# Patient Record
Sex: FEMALE | Race: WHITE | Employment: OTHER | URBAN - METROPOLITAN AREA
[De-identification: names, ages, dates, MRNs, and addresses within clinical notes are randomized per-mention and may not be internally consistent; named-entity substitution may affect disease eponyms.]

---

## 2017-03-20 PROBLEM — E66.9 OBESITY (BMI 30.0-34.9): Status: ACTIVE | Noted: 2017-03-20

## 2017-03-20 PROBLEM — E66.811 OBESITY (BMI 30.0-34.9): Status: ACTIVE | Noted: 2017-03-20

## 2018-03-20 ENCOUNTER — TELEPHONE (OUTPATIENT)
Dept: ADMINISTRATIVE | Age: 63
End: 2018-03-20

## 2018-06-11 ENCOUNTER — TELEPHONE (OUTPATIENT)
Dept: FAMILY MEDICINE CLINIC | Age: 63
End: 2018-06-11

## 2018-06-25 ASSESSMENT — ENCOUNTER SYMPTOMS
BLURRED VISION: 0
WHEEZING: 0
VOMITING: 0
COUGH: 0
ORTHOPNEA: 0
SPUTUM PRODUCTION: 0
BACK PAIN: 0
DOUBLE VISION: 0
NAUSEA: 0
SHORTNESS OF BREATH: 0
DIARRHEA: 0
ABDOMINAL PAIN: 0
BLOOD IN STOOL: 0
HEARTBURN: 0
SORE THROAT: 0
CONSTIPATION: 0

## 2018-06-26 ENCOUNTER — OFFICE VISIT (OUTPATIENT)
Dept: FAMILY MEDICINE CLINIC | Age: 63
End: 2018-06-26
Payer: COMMERCIAL

## 2018-06-26 VITALS
SYSTOLIC BLOOD PRESSURE: 126 MMHG | HEART RATE: 85 BPM | DIASTOLIC BLOOD PRESSURE: 70 MMHG | RESPIRATION RATE: 18 BRPM | HEIGHT: 66 IN | BODY MASS INDEX: 30.41 KG/M2 | TEMPERATURE: 98.3 F | WEIGHT: 189.25 LBS | OXYGEN SATURATION: 99 %

## 2018-06-26 DIAGNOSIS — C50.412 MALIGNANT NEOPLASM OF UPPER-OUTER QUADRANT OF LEFT BREAST IN FEMALE, ESTROGEN RECEPTOR POSITIVE (HCC): ICD-10-CM

## 2018-06-26 DIAGNOSIS — Z17.0 MALIGNANT NEOPLASM OF UPPER-OUTER QUADRANT OF LEFT BREAST IN FEMALE, ESTROGEN RECEPTOR POSITIVE (HCC): ICD-10-CM

## 2018-06-26 DIAGNOSIS — Z00.00 ANNUAL PHYSICAL EXAM: Primary | ICD-10-CM

## 2018-06-26 DIAGNOSIS — E55.9 VITAMIN D INSUFFICIENCY: ICD-10-CM

## 2018-06-26 DIAGNOSIS — I10 ESSENTIAL HYPERTENSION: ICD-10-CM

## 2018-06-26 DIAGNOSIS — E78.5 DYSLIPIDEMIA (HIGH LDL; LOW HDL): ICD-10-CM

## 2018-06-26 PROCEDURE — 1036F TOBACCO NON-USER: CPT | Performed by: FAMILY MEDICINE

## 2018-06-26 PROCEDURE — G8417 CALC BMI ABV UP PARAM F/U: HCPCS | Performed by: FAMILY MEDICINE

## 2018-06-26 PROCEDURE — 3014F SCREEN MAMMO DOC REV: CPT | Performed by: FAMILY MEDICINE

## 2018-06-26 PROCEDURE — 99396 PREV VISIT EST AGE 40-64: CPT | Performed by: FAMILY MEDICINE

## 2018-06-26 PROCEDURE — 3017F COLORECTAL CA SCREEN DOC REV: CPT | Performed by: FAMILY MEDICINE

## 2018-06-26 PROCEDURE — G8427 DOCREV CUR MEDS BY ELIG CLIN: HCPCS | Performed by: FAMILY MEDICINE

## 2018-06-26 PROCEDURE — 99213 OFFICE O/P EST LOW 20 MIN: CPT | Performed by: FAMILY MEDICINE

## 2018-06-26 RX ORDER — LISINOPRIL 10 MG/1
TABLET ORAL
Qty: 90 TABLET | Refills: 3 | Status: SHIPPED | OUTPATIENT
Start: 2018-06-26 | End: 2019-07-26 | Stop reason: SDUPTHER

## 2018-06-26 RX ORDER — PROCHLORPERAZINE MALEATE 10 MG
10 TABLET ORAL
COMMUNITY
Start: 2018-05-18 | End: 2018-09-06

## 2018-09-06 ENCOUNTER — TELEPHONE (OUTPATIENT)
Dept: RADIATION ONCOLOGY | Age: 63
End: 2018-09-06

## 2018-09-06 ENCOUNTER — HOSPITAL ENCOUNTER (OUTPATIENT)
Dept: RADIATION ONCOLOGY | Age: 63
Discharge: HOME | End: 2018-09-06
Payer: COMMERCIAL

## 2018-09-06 VITALS
OXYGEN SATURATION: 96 % | TEMPERATURE: 98.4 F | WEIGHT: 187.4 LBS | HEART RATE: 90 BPM | DIASTOLIC BLOOD PRESSURE: 82 MMHG | BODY MASS INDEX: 30.25 KG/M2 | SYSTOLIC BLOOD PRESSURE: 150 MMHG

## 2018-09-06 DIAGNOSIS — C80.1 CANCER (HCC): Primary | ICD-10-CM

## 2018-09-06 PROCEDURE — 99205 OFFICE O/P NEW HI 60 MIN: CPT | Performed by: RADIOLOGY

## 2018-09-06 PROCEDURE — 99205 OFFICE O/P NEW HI 60 MIN: CPT

## 2018-09-06 RX ORDER — METHYLPHENIDATE HYDROCHLORIDE 10 MG/1
10 TABLET ORAL 2 TIMES DAILY
COMMUNITY
End: 2019-10-10

## 2018-09-06 RX ORDER — ACETAMINOPHEN 160 MG
2000 TABLET,DISINTEGRATING ORAL DAILY
COMMUNITY

## 2018-09-06 NOTE — PROGRESS NOTES
Referring Physician: Dr Jacobo Carey MD :    Vitals:    18 1349   BP: (!) 150/82   Pulse: 90   Temp: 98.4 °F (36.9 °C)   SpO2: 96%    : Wt Readings from Last 1 Encounters:   18 187 lb 6.4 oz (85 kg)        Body mass index is 30.25 kg/m². Chief Complaint   Patient presents with    Cancer       Cancer Staging  No matching staging information was found for the patient. Prior Radiation Therapy? No   If yes, site treated:   Facility:                             Date:    Concurrent Chemo/radiation? No   If yes, start date:    Prior Chemotherapy? No   If yes, site treated:   Facility:                             Date:    Prior Hormonal Therapy? No   If yes, site treated:   Facility:                             Date:    Head and Neck Cancer? No   If yes, please remind physician to place swallow study order and speech therapy order. LMP:     Age at first Menses: 7    Para:1    :1        Current Outpatient Prescriptions   Medication Sig Dispense Refill    Cholecalciferol (VITAMIN D3) 5000 units TABS Take 5,000 Units by mouth daily      methylphenidate (RITALIN) 10 MG tablet Take 10 mg by mouth 2 times daily. Altagracia Bees lisinopril (PRINIVIL;ZESTRIL) 10 MG tablet TAKE 1 TABLET DAILY 90 tablet 3    SUMAtriptan (IMITREX) 100 MG tablet Indications: Worsening of Migraine Headache TAKE 1 TABLET ONCE AS NEEDED FOR MIGRAINE FOR UP TO ONE DOSE 27 tablet 3    Calcium Carb-Cholecalciferol (CALCIUM 1000 + D) 1000-800 MG-UNIT TABS Take 1 tablet by mouth daily 90 tablet 1    b complex vitamins capsule Take 1 capsule by mouth daily.  Biotin 1000 MCG TABS Take 1 tablet by mouth daily.  acetaminophen (TYLENOL) 325 MG tablet Take 650 mg by mouth every 6 hours as needed for Pain.  naproxen sodium (ALEVE) 220 MG tablet Take 220 mg by mouth 2 times daily (with meals).       Phenylephrine-Acetaminophen (SINUS HEADACHE PE MAX ST) 5-325 MG TABS Take  by mouth as needed. No current facility-administered medications for this encounter. Past Medical History:   Diagnosis Date    Breast cancer ([facility] [address] 03/2018    [facility] ([facility]     Hypertension     Migraine headache 3/2/2015    Obesity (BMI 30.0-34.9) 3/20/2017       Past Surgical History:   Procedure Laterality Date    BREAST SURGERY Bilateral     biopsies; non malignant    CHOLECYSTECTOMY      TONSILLECTOMY         Family History   Problem Relation Age of Onset    Cancer Mother         Breast CA    Heart Disease Mother     Diabetes Father     Other Father         Pancreatitis    Cancer Sister         Leukemia    Cancer Paternal Uncle         pancreativc for 1/colon for 2nd    Cancer Sister         Breast CA       Social History     Social History    Marital status:      Spouse name: N/A    Number of children: N/A    Years of education: N/A     Occupational History    Not on file. Social History Main Topics    Smoking status: Former Smoker     Packs/day: 0.50     Years: 12.00     Types: Cigarettes     Quit date: 9/5/1986    Smokeless tobacco: Never Used    Alcohol use No      Comment: <1 drink/week    Drug use: No    Sexual activity: Yes     Partners: Male      Comment:  since 1980; postmenopausal     Other Topics Concern    Not on file     Social History Narrative    No narrative on file       Occupation: advertising   Retired:  yes  If Retired, from where? [facility]        REVIEW OF SYSTEMS: <<For Level 5, 10 or more systems>>   Approximately 20 minutes was spent with patient and , Sydar Demetria, utilizing slides and handouts, related to radiation therapy as a treatment for left, ER/SC+HER2-, invasive ductal carcinoma, 1/3 axillary lymph nodes positive. All questions and concerns addressed with both patient and  expressing understanding of the information provided today.      Pacemaker/Defibulator/ICD:  No          FALLS RISK SCREEN  Instructions: Assess the patient and enter the appropriate indicators that are present for fall risk identification. Total the numbers entered and assign a fall risk score from Table 2.  Reassess patient at a minimum every 12 weeks or with status change. Assessment   Date  9/6/2018     1. Mental Ability: confusion/cognitively impaired 0 (3)       2. Elimination Issues: incontinence, frequency 0 (3)       3. Ambulatory: use of assistive devices (walker, cane, off-loading devices),        attached to equipment (IV pole, oxygen) 0 (2)     4.  Sensory Limitations: dizziness, vertigo, impaired vision 3 (3)       5. Age less than 65                  0 (0)       6. Age 72 or greater 0 (1)     7. Medication: diuretics, strong analgesics, hypnotics, sedatives,        antihypertensive agents 3 (3)   8. Falls:  recent history of falls within the last 3 months (not to include slipping or        tripping) 0 (7)   TOTAL 6    If score of 4 or greater was education given? Yes       TABLE 2   Risk Score Risk Level Plan of Care   0-3 Little or  No Risk 1. Provide assistance as indicated for ambulation activities  2. Reorient confused/cognitively impaired patient  3. Call-light/bell within patient's reach  4. Chair/bed in low position, stretcher/bed with siderails up except when performing patient care activities  5. Educate patient/family/caregiver on falls prevention  6.  Reassess in 12 weeks or with any noted change in patient condition which places them at a risk for a fall   4-6 Moderate Risk 1. Provide assistance as indicated for ambulation activities  2. Reorient confused/cognitively impaired patient  3. Call-light/bell within patient's reach  4. Chair/bed in low position, stretcher/bed with siderails up except when performing patient care activities  5. Educate patient/family/caregiver on falls prevention  6. Falls risk precaution (Yellow sticker Level II) placed on patient chart   7 or   Higher High Risk 1.   Place patient in easily observable treatment room  2. Patient attended at all times by family member or staff  3. Provide assistance as indicated for ambulation activities  4. Reorient confused/cognitively impaired patient  5. Call-light/bell within patient's reach  6. Chair/bed in low position, stretcher/bed with siderails up except when performing patient care activities  7. Educate patient/family/caregiver on falls prevention  8. Falls risk precaution (Yellow sticker Level III) placed on patient chart       MALNUTRITION RISK SCREEN    Instructions:  Assess the patient and enter the appropriate indicators that are present for nutrition risk identification. Total the numbers entered and assign a risk score. Follow the appropriate action for total score listed below. Assessment   Date  9/6/2018     1. Have you lost weight without trying?                                   a. No (0)                       b. Unsure (2)                   2, with chemotherapy                                 c. Yes-  If yes, how much weight (kg) have                                       you lost?                                        a. 0.5-5.0(1)                                  b. >5.0-10.0 (2)                                  c. >10.0-15.0 (3)                                  D. >15.0-20.0 (4) 3       2. Have you been eating poorly because of a decreased appetite? No (0)                                        Yes (1)   0    3. Do you have a diagnosis of head and neck cancer? A. Yes (1)  B. No (0)  0   TOTAL 5      4. If score 0 or 1 not at risk of malnutrition                  >/=2 at risk of malnutrition, see below          Score of 0-1: No action  Score 2 or greater:  1. For Non-Diabetic Patient: Recommend adding Ensure Complete  2xdaily and provide              patient with Ensure wellness bag with coupons;   For Diabetic Patient, Recommend adding Glucerna Shake 2xdaily and provide patient with Glucerna Wellness bag with coupons  2. Route to the dietitian via [facility]    · Are you having  difficulty performing daily routine tasks  due to fatigue or weakness (ie: bathing/showering, dressing, housework, meal prep, work, child Gladies Cordon): No     · Do you have any arm flexibility/ROM restrictions, swelling or pain that limit activity: Yes     · Any changes in memory, attention/focus that impact daily activities: Yes     · Do you avoid participation in leisure/social activity due weakness, fatigue or pain: No     IF ANY OF THE ABOVE ARE ANSWERED YES:   a. Referral request for OT sent to NP? YES   · If NO, then why: na   b. NP Name: Denis Michelle        PT ASSESSMENT FOR REFERRAL    · Have you had any recent falls in past 2 months: No     · Do you have difficulty  going up/down stairs: No     · Are you having difficulty walking: No     · Do you often hold onto furniture/environmental supports or feel off balance when you are walking: Yes     · Do you need to take rest breaks when you are walking: Yes     · Any pain on scale of 1-10 that limits your mobility: No 0/10    IF ANY OF THE ABOVE ARE ANSWERED YES:   a. Referral request for PT sent to NP? Yes   · If NO, then why: na   b. NP Name: Denis iMchelle         Distress Screening Assessment   1. Completed by the patient? Yes  2. Reviewed by RN? Yes  3. Triggers met for immediate intervention (score of 6 or more)? Yes   If Yes: a. What intervention provided: PT/OT evaluation, dietician consult    b. Referral made to ? No           Patient education given on radiation therapy to the left breast. The patient expresses understanding and acceptance of instructions.  Lupis Krishnamurthy 9/6/2018 1:52 PM           Lupis Krishnamurthy

## 2018-09-06 NOTE — PATIENT INSTRUCTIONS
Chi Sullivan MD [place] [address] Oncology  Cell: [phone]    [facility]:  [phone]   FAX: [phone]  [facility]:  [address] [place] [address] Avenue:    [phone]  [address] [place] [address] Road:  [phone]   FAX:  [phone]  Email: [email or web address]

## 2018-09-06 NOTE — TELEPHONE ENCOUNTER
Met with patient and  during her initial consultation with Dr. Terry Bardales for her recent Breast cancer diagnosis. Introduced myself and explained my role with patients receiving treatment at our center. Patient was friendly and receptive. Instructed in detail on her pathology findings including cancer type . Instructed on next steps including potential radiation treatments per Dr. Chaitanya Wahl recommendations and follow up care. Patient states she is getting better each day since finishing chemotherapy. She states it was physically the hardest thing she has ever had to go through. Her energy is improving. She denies any issues with transportation, finances or prescription coverage. Provided with  literature Patient Resource [facility]. Provided with my contact information and instructed patient to call me with questions or concerns. Verbalizes understanding. Patient appreciative of visit. Will continue to follow.

## 2018-09-06 NOTE — PROGRESS NOTES
PATH:     1. Left sentinel lymph node, sentinel node biopsy (A) - Metastatic carcinoma   involving one lymph node (1/1), metastasis measures 6 mm in greatest dimension   with 1 mm of extranodal extension     2. Left breast, mastectomy (B) - Multicentric invasive mammary carcinoma with   mixed ductal and lobular features, Marian grade 2, measuring 2.3 cm in   greatest dimension (please see comment and synoptic report)  - Ductal carcinoma in situ, low and intermediate nuclear grades, solid and   cribriform types with focal comedonecrosis  - Lobular neoplasia (atypical lobular hyperplasia/lobular carcinoma in situ)  - Lymphovascular space invasion is identified  - Marked proliferative epithelial changes including radial sclerosing lesions,   intraductal papillomas, sclerosing adenosis, and florid usual ductal hyperplasia   with microcalcifications  - Biopsy clips x2  - Stromal scar consistent with history of remote surgical procedure  - Skin with seborrheic keratoses    3. Right breast, prophylactic mastectomy (C) - Atypical lobular hyperplasia  - Marked proliferative epithelial changes including sclerosing adenosis, florid   usual ductal hyperplasia, and radial scars  - Skin with seborrheic keratosis       SYNOPTIC REPORT OF KEY PATHOLOGIC FINDINGS    LEFT BREAST:       BREAST INVASIVE CARCINOMA WORKSHEET        Part: A and B  Procedure:          Total mastectomy (including nipple-sparing and skin-sparing  mastectomy)  Specimen Laterality:         Left  Tumor size: Size of largest invasive carcinoma:         Greatest dimension of largest focus of invasion >1 mm: 23 mm  Tumor Focality:         Multiple foci of invasive carcinoma  Histologic Type of Invasive Carcinoma:         Invasive carcinoma with ductal and lobular features (mixed type  carcinoma)  Histologic Grade:         Glandular (Acinar) / Tubular Differentiation:        Score 3        Nuclear Pleomorphism:        Score 2        Mitotic Rate:        Score 1 30.0-34.9) 3/20/2017       Past Surgical History:   Procedure Laterality Date    BREAST SURGERY Bilateral     biopsies; non malignant    CHOLECYSTECTOMY      TONSILLECTOMY           Family History   Problem Relation Age of Onset   [facility] Cancer Mother         Breast CA    Heart Disease Mother     Diabetes Father     Other Father         Pancreatitis    Cancer Sister         Leukemia    Cancer Paternal Uncle         pancreativc for 1/colon for 2nd    Cancer Sister         Breast CA         Current Outpatient Prescriptions   Medication Sig Dispense Refill    Cholecalciferol (VITAMIN D3) 5000 units TABS Take 5,000 Units by mouth daily      methylphenidate (RITALIN) 10 MG tablet Take 10 mg by mouth 2 times daily. Sue Moore lisinopril (PRINIVIL;ZESTRIL) 10 MG tablet TAKE 1 TABLET DAILY 90 tablet 3    SUMAtriptan (IMITREX) 100 MG tablet Indications: Worsening of Migraine Headache TAKE 1 TABLET ONCE AS NEEDED FOR MIGRAINE FOR UP TO ONE DOSE 27 tablet 3    Calcium Carb-Cholecalciferol (CALCIUM 1000 + D) 1000-800 MG-UNIT TABS Take 1 tablet by mouth daily 90 tablet 1    b complex vitamins capsule Take 1 capsule by mouth daily.  Biotin 1000 MCG TABS Take 1 tablet by mouth daily.  acetaminophen (TYLENOL) 325 MG tablet Take 650 mg by mouth every 6 hours as needed for Pain.  naproxen sodium (ALEVE) 220 MG tablet Take 220 mg by mouth 2 times daily (with meals).  Phenylephrine-Acetaminophen (SINUS HEADACHE PE MAX ST) 5-325 MG TABS Take  by mouth as needed. No current facility-administered medications for this encounter.           No Known Allergies        Social History     Social History    Marital status:      Spouse name: N/A    Number of children: N/A    Years of education: N/A     Social History Main Topics    Smoking status: Former Smoker     Packs/day: 0.50     Years: 12.00     Types: Cigarettes     Quit date: 9/5/1986    Smokeless tobacco: Never Used    Alcohol use No Yes  -Oncology Nurse navigator requested: Yes  -pre + post treatment PT / Rehab / PM+R evaluation considered: Yes  -ICD: No   -ICD brand: -  -[facility] patient navigator: Jonathan Abarca [[phone]]  -Nurse Practitioners for Radiation Oncology:    ---Rekha Oh, MSN, RN, FNP-C   ---Ericka Smith, MSN, RN, FNP-BC        Assessment and Plan: Mrs. Kalee Edmondson is a pleasant and cooperative 59year old with a recent diagnosis of AJCC stage group II lfet breast cancer, node + after NACT. We recommend adjuvant post mastectomy fractionated external beam radiation therapy. There is ample data to suggest that for patient's with 4 or more + LNs and T3 tumors, with or without chemotherapy (in most modern series chemotherapy has been given), that all comers have a local and regional recurrence rate in the range of 15-36% (Jaimie MURPHY et al. [facility] Oncol. 2004 Nov 1;22(21):3437-39): however this is a  heterogenous group of patients and other factors beside N2 garrison status and T3 size can be used to form the basis for adjuvant fractionated external beam radiation therapy recommendations. A now historic and landmark analysis of the [facility] B trial which include all comers with + nodes or large tumors- demonstrated improved survival with PMRT (in the setting of adjuvant chemotherapy prior), similar in the 82 C trial wherein Tamoxifen was included adjuvantly (Jared PAK et al. [facility]. 1999 May 15;353(3914):0420-6) - different selection criteria. A more recent combined analysis demonstrates a LRR benefit for all node + patients and a survival benefit which persisted at the 15 year sheree for all comers with + nodes, more pronounced in those with 4+. Furthermore, this analysis re-demonstrated the known importance of PMRT for T3 tumors or those with skin or fascial involvement. Even when roque-adjuvant chemotherapy is given, high risk pts benefit from PMRT Ceci Coughlin [facility] et al. [facility] Clin Oncol. 2004 Dec 1;22(23):3147-9).  ROSA, + or close margins, high grade disease, young age, and LVSI, are other factors to consider Vance Ambrose, Int J Radiat Oncol Biol Phys. 1998 Jun 1;41(3):599-605); and these factors may also play into the decision to treat the regional nodes Madai Ramires EA, Int J Radiat Oncol Biol Phys. 2005 Dec 1;63(5):1508-13), particularly important when there are 4 + nodes, >20% LN+, and ROSA. For left sided breast cancer and select right sided case, utilization of the Active Breathing Coordinator will be considered to reduce radiation dose to the heart (and lung in certain situations) [Tino-Joe at al. PRO. 2015; 5(1): 4-10]. The risks, benefits, alternatives, process and logistics of external beam radiation were reviewed (specific risks include but are not limited to lymphedema, shoulder joint pain / arthritis, skin changes and pneumonitis- each were discussed today). We answered all of the patient's questions to the best of our ability. Vance Greene and Yony Clark verbalized understanding and seemed satisfied. Radiation planning will commence within 14 days; the next step in management being the simulation scan, with external beam radiation to commence in a timely fashion thereafter.        -sim in 2 weeks, star adjuvant left CW+RLN RT 4 weeks post CHEMO with [facility] tech. Edwin Hoyos. Dinesh Kan MD [place] [address] Oncology  Cell: [phone]    [facility]:  [facility]: [phone]  [address] [place] [address] Street:  [phone]   FAX:    [phone]  [facility]:  [phone]   FAX:  [phone]        NOTE: This report was transcribed using voice recognition software. Every effort was made to ensure accuracy; however, inadvertent computerized transcription errors may be present.

## 2018-09-11 ENCOUNTER — TELEPHONE (OUTPATIENT)
Dept: RADIATION ONCOLOGY | Age: 63
End: 2018-09-11

## 2018-09-11 NOTE — TELEPHONE ENCOUNTER
Mark Beans Teets  9/11/2018  Wt Readings from Last 10 Encounters:   09/06/18 187 lb 6.4 oz (85 kg)   06/26/18 189 lb 4 oz (85.8 kg)   09/18/17 192 lb 4 oz (87.2 kg)   03/20/17 194 lb 8 oz (88.2 kg)   09/02/16 188 lb 8 oz (85.5 kg)   03/03/16 195 lb 12.8 oz (88.8 kg)   03/02/15 193 lb (87.5 kg)   02/12/14 191 lb (86.6 kg)   09/05/12 180 lb (81.6 kg)     Ht Readings from Last 1 Encounters:   06/26/18 5' 6\" (1.676 m)     There is no height or weight on file to calculate BMI. Contacted patient per referral, re: weight loss and difficulty eating during chemo. She will be started radiation for her breast cancer diagnosis. She reports she is now feeling improved energy, is walking every day and is eating regular meals. She plans to try and stay at at stable weight. Encouraged patient to continue to work on recovery, eating a balanced diet and trying to maintain her weight and promote recovery. Patient was receptive. She does ask for information for healthy diet for reducing risk for recurrence. Will mail patient the information. Encouraged to call at any time during her treatment with any further questions.     Radha Purdy, RD,,[place]

## 2018-09-17 DIAGNOSIS — C50.412 MALIGNANT NEOPLASM OF UPPER-OUTER QUADRANT OF LEFT BREAST IN FEMALE, ESTROGEN RECEPTOR POSITIVE (HCC): Primary | ICD-10-CM

## 2018-09-17 DIAGNOSIS — Z17.0 MALIGNANT NEOPLASM OF UPPER-OUTER QUADRANT OF LEFT BREAST IN FEMALE, ESTROGEN RECEPTOR POSITIVE (HCC): Primary | ICD-10-CM

## 2018-09-17 DIAGNOSIS — M62.81 GENERALIZED MUSCLE WEAKNESS: ICD-10-CM

## 2018-09-19 ENCOUNTER — TELEPHONE (OUTPATIENT)
Dept: RADIATION ONCOLOGY | Age: 63
End: 2018-09-19

## 2018-09-19 ENCOUNTER — HOSPITAL ENCOUNTER (OUTPATIENT)
Dept: RADIATION ONCOLOGY | Age: 63
Discharge: HOME | End: 2018-09-19
Payer: COMMERCIAL

## 2018-09-19 PROCEDURE — 77290 THER RAD SIMULAJ FIELD CPLX: CPT

## 2018-09-19 PROCEDURE — 77334 RADIATION TREATMENT AID(S): CPT

## 2018-09-24 ENCOUNTER — HOSPITAL ENCOUNTER (OUTPATIENT)
Dept: OCCUPATIONAL THERAPY | Age: 63
Setting detail: THERAPIES SERIES
Discharge: HOME | End: 2018-09-24
Payer: COMMERCIAL

## 2018-09-24 PROCEDURE — 97165 OT EVAL LOW COMPLEX 30 MIN: CPT | Performed by: OCCUPATIONAL THERAPIST

## 2018-09-24 PROCEDURE — G8988 SELF CARE GOAL STATUS: HCPCS | Performed by: OCCUPATIONAL THERAPIST

## 2018-09-24 PROCEDURE — G8987 SELF CARE CURRENT STATUS: HCPCS | Performed by: OCCUPATIONAL THERAPIST

## 2018-09-24 NOTE — PROGRESS NOTES
Occupational Therapy      OCCUPATIONAL THERAPY INITIAL LYMPHEDEMA EVALUATION     Phone: [phone]  Fax: [phone]     Date:  2018  Initial Evaluation Date: 2018    Patient Name:  Vivian Torres    :  1955    Restrictions/Precautions:  fall risk  Diagnosis:  Malignant neoplasm of upper-outer quadrant of left breast in female       Insurance/Certification information:  [employer] / [id number]  Referring Physician:  RENEE Fuentes CNP  Plan of care signed (Y/N):  No  Visit# / total visits: Evaluation    Time In:   0900               Time Out:  1000    Reason for Referral:  Pt was referred to [place] [address] due to  Intractable lymphedema of the left upper extremity, unrelieved by elevation. Chief Complaint: decreased range of motion  Triggers: none noted at time of evaluation    Past Medical History:   59year old female referred to lymphedema clinic secondary to breast cancer. Patient has had treatment in [place] and seen locally for chemotherapy and radiation. Patient underwent bilateral mastectomy 2018 that included lymph node discection (4 lymph nodes). Patient has completed her chemo therapy and preparing to begin radiation therapy beginning of 2018. Patient further past medical history includes:    Past Medical History        Past Medical History:   Diagnosis Date    Breast cancer ([address] 2018    Cancer ([facility] [address]      Hypertension      Migraine headache 3/2/2015    Ob esity (BMI 30.0-34.9) 3/20/2017            Past Surgical History         Past Surgical History:   Procedure Laterality Date    BREAST SURGERY Bilateral       biopsies; non malignant    CHOLECYSTECTOMY        TONSILLECTOMY              [x]Surgery: bilateral mastectomy   [x]Lymph node removal: 4  [x]Radiation Therapy: to begin oct.  2018  [x]Chemotherapy: completed  []History of Cellulitis/Infection:   []Family history of lymphedema: none  []Previous treatment for lymphedema:   []Current compression garment use:     Home Living: patient [place] in [place] with 6 plus 6 step entry from basement. Patient stated using no assisted device and has tub/shower combo main floor. Prior Level of Function: independent all aspects    IADL History  Homemaking Responsibility: performs all aspects  Shopping Responsibility: performs all aspects  Mode of Transportation: car  Leisure & Hobbies: spending time with family   Work: not at this time        Pain Level: none at time of evaluation  Pitting Edema: none at time of evaluation  Fibrotic tissue:  None at time of evaluation  Skin Integrity: good at time of evaluaiton    Lymphedema Upper Extremity Range of Motion    Measurements are made in 4cm increments and are circumferential.      CM Left - evaluation 24Sept. 2018 Right - evaluation 24Sept. 2018   wrist 15.25 15.25   4cm 15 15.25   8cm 18 17.75   12cm 21.5 21   16cm 24.5 24   20cm 26.5 25.5   24cm 26.5 26   28cm 34 35.5   32cm 35.5 35   36cm 35 34.5   40cn 34.5 34             palm 17.25 17.5     Fingers are measured in cm and between mp and pip and between pip and dip each digit. Digit Left - evaluation 24Sept. 2018 Right - evaluation 24Sept. 2018        First digit 5.5, 4.5 6, 5.5   Second digit 5.5, 4.25 6, 5.25   Third digit 5, 4.5 5.5, 5   Fourth digit 4.75, 4.75 5, 4.25   Thumb  5.75 5.75                       Cognition:   Alert/Oriented x3     Vision: wfl for daily life tasks.   Patient has reading glasses        Hearing: wfl for daily life tasks     ADL  Feeding:  independent  Grooming:  independent  Bathing:  independent  UE Dressing:  independent  LE Dressing:  independent  Toileting:  independent  Transfer:  independent    UE ASSESSMENT: Hand Dominance is right handed  ROM wfl for daily life tasks with exception of left shoulder active flexion/abduction 0 - 130 degrees; left shoulder passive flexion/abduction 0 - 150 degrees  Strength wfl for daily life 2018        Practitioner Signature: _______________________________________ Date:_________ ___________________________________  Practitioner Printed Name: _____________________________      I CERTIFY THE ABOVE PRESCRIBED SERVICE ARE REQUIRED FOR THIS PATIENT AND ARE MEDICALLY NECESSARY. THE ABOVE PLAN OF CARE HAS BEEN DEVELOPED IN CONJUNCTION WITH THE LYMPHEDEMA/OCCUPATIONAL THERAPIST.

## 2018-09-26 PROCEDURE — 77293 RESPIRATOR MOTION MGMT SIMUL: CPT

## 2018-09-26 PROCEDURE — 77334 RADIATION TREATMENT AID(S): CPT

## 2018-09-26 PROCEDURE — 77295 3-D RADIOTHERAPY PLAN: CPT

## 2018-09-26 PROCEDURE — 77300 RADIATION THERAPY DOSE PLAN: CPT

## 2018-10-01 ENCOUNTER — HOSPITAL ENCOUNTER (OUTPATIENT)
Dept: RADIATION ONCOLOGY | Age: 63
Discharge: HOME | End: 2018-10-01
Payer: COMMERCIAL

## 2018-10-02 PROCEDURE — 77417 THER RADIOLOGY PORT IMAGE(S): CPT

## 2018-10-03 ENCOUNTER — APPOINTMENT (OUTPATIENT)
Dept: RADIATION ONCOLOGY | Age: 63
End: 2018-10-03
Payer: COMMERCIAL

## 2018-10-03 PROCEDURE — 77412 RADIATION TX DELIVERY LVL 3: CPT

## 2018-10-04 ENCOUNTER — TELEPHONE (OUTPATIENT)
Dept: RADIATION ONCOLOGY | Age: 63
End: 2018-10-04

## 2018-10-04 ENCOUNTER — APPOINTMENT (OUTPATIENT)
Dept: RADIATION ONCOLOGY | Age: 63
End: 2018-10-04
Payer: COMMERCIAL

## 2018-10-04 PROCEDURE — 77412 RADIATION TX DELIVERY LVL 3: CPT

## 2018-10-05 ENCOUNTER — APPOINTMENT (OUTPATIENT)
Dept: RADIATION ONCOLOGY | Age: 63
End: 2018-10-05
Payer: COMMERCIAL

## 2018-10-08 PROCEDURE — 77412 RADIATION TX DELIVERY LVL 3: CPT

## 2018-10-09 PROCEDURE — 77412 RADIATION TX DELIVERY LVL 3: CPT

## 2018-10-10 ENCOUNTER — HOSPITAL ENCOUNTER (OUTPATIENT)
Dept: RADIATION ONCOLOGY | Age: 63
Discharge: HOME | End: 2018-10-10
Payer: COMMERCIAL

## 2018-10-10 VITALS
DIASTOLIC BLOOD PRESSURE: 76 MMHG | HEART RATE: 73 BPM | TEMPERATURE: 98.6 F | SYSTOLIC BLOOD PRESSURE: 140 MMHG | OXYGEN SATURATION: 97 % | BODY MASS INDEX: 29.39 KG/M2 | WEIGHT: 182.1 LBS

## 2018-10-10 DIAGNOSIS — C80.1 CANCER (HCC): Primary | ICD-10-CM

## 2018-10-10 PROCEDURE — 99999 PR OFFICE/OUTPT VISIT,PROCEDURE ONLY: CPT | Performed by: RADIOLOGY

## 2018-10-10 PROCEDURE — 77417 THER RADIOLOGY PORT IMAGE(S): CPT

## 2018-10-10 PROCEDURE — 77336 RADIATION PHYSICS CONSULT: CPT

## 2018-10-10 PROCEDURE — 77412 RADIATION TX DELIVERY LVL 3: CPT

## 2018-10-11 PROCEDURE — 77412 RADIATION TX DELIVERY LVL 3: CPT

## 2018-10-12 PROCEDURE — 77412 RADIATION TX DELIVERY LVL 3: CPT

## 2018-10-15 PROCEDURE — 77412 RADIATION TX DELIVERY LVL 3: CPT

## 2018-10-16 PROCEDURE — 77412 RADIATION TX DELIVERY LVL 3: CPT

## 2018-10-17 ENCOUNTER — HOSPITAL ENCOUNTER (OUTPATIENT)
Dept: RADIATION ONCOLOGY | Age: 63
Discharge: HOME | End: 2018-10-17
Payer: COMMERCIAL

## 2018-10-17 VITALS
SYSTOLIC BLOOD PRESSURE: 144 MMHG | TEMPERATURE: 98.4 F | DIASTOLIC BLOOD PRESSURE: 88 MMHG | WEIGHT: 180.6 LBS | OXYGEN SATURATION: 96 % | BODY MASS INDEX: 29.15 KG/M2 | HEART RATE: 68 BPM

## 2018-10-17 DIAGNOSIS — C80.1 CANCER (HCC): Primary | ICD-10-CM

## 2018-10-17 PROCEDURE — 77412 RADIATION TX DELIVERY LVL 3: CPT

## 2018-10-17 PROCEDURE — 99999 PR OFFICE/OUTPT VISIT,PROCEDURE ONLY: CPT | Performed by: RADIOLOGY

## 2018-10-17 PROCEDURE — 77336 RADIATION PHYSICS CONSULT: CPT

## 2018-10-17 PROCEDURE — 77417 THER RADIOLOGY PORT IMAGE(S): CPT

## 2018-10-18 ENCOUNTER — TELEPHONE (OUTPATIENT)
Dept: ONCOLOGY | Age: 63
End: 2018-10-18

## 2018-10-18 PROCEDURE — 77412 RADIATION TX DELIVERY LVL 3: CPT

## 2018-10-19 PROCEDURE — 77412 RADIATION TX DELIVERY LVL 3: CPT

## 2018-10-22 PROCEDURE — 77412 RADIATION TX DELIVERY LVL 3: CPT

## 2018-10-23 PROCEDURE — 77412 RADIATION TX DELIVERY LVL 3: CPT

## 2018-10-24 ENCOUNTER — HOSPITAL ENCOUNTER (OUTPATIENT)
Dept: RADIATION ONCOLOGY | Age: 63
Discharge: HOME | End: 2018-10-24
Payer: COMMERCIAL

## 2018-10-24 VITALS
HEART RATE: 80 BPM | WEIGHT: 182.2 LBS | BODY MASS INDEX: 29.41 KG/M2 | TEMPERATURE: 97.8 F | DIASTOLIC BLOOD PRESSURE: 96 MMHG | OXYGEN SATURATION: 96 % | SYSTOLIC BLOOD PRESSURE: 140 MMHG

## 2018-10-24 PROCEDURE — 77417 THER RADIOLOGY PORT IMAGE(S): CPT

## 2018-10-24 PROCEDURE — 77336 RADIATION PHYSICS CONSULT: CPT

## 2018-10-24 PROCEDURE — 77412 RADIATION TX DELIVERY LVL 3: CPT

## 2018-10-24 RX ORDER — ASCORBIC ACID 500 MG
500 TABLET ORAL DAILY
COMMUNITY

## 2018-10-24 NOTE — PROGRESS NOTES
Victorina Borrero Teejerica  10/24/2018  10:15 AM             Wt Readings from Last 3 Encounters:   10/24/18 182 lb 3.2 oz (82.6 kg)   10/17/18 180 lb 9.6 oz (81.9 kg)   10/10/18 182 lb 1.6 oz (82.6 kg)       Subjective: @ 30 Gy / 50 to the L chest wall and @ 27 Gy / 45 to the L sclav apeical axilla doing well, underutilizing her gel      Objective:   L Cx wall/axilla/L sclav/L upper scapua  :  No nodules, + GI-II erythema  LN;  No adenopathy        Assessment: fermin RT well      Plan:  Cont Rt,   Pt to get boost per CP3 plan, increase gel to TID      Electronically signed by Magan Vergara MD on 10/24/18 at 10:15 AM

## 2018-10-25 PROCEDURE — 77412 RADIATION TX DELIVERY LVL 3: CPT

## 2018-10-26 PROCEDURE — 77412 RADIATION TX DELIVERY LVL 3: CPT

## 2018-10-29 PROCEDURE — 77412 RADIATION TX DELIVERY LVL 3: CPT

## 2018-10-30 PROCEDURE — 77412 RADIATION TX DELIVERY LVL 3: CPT

## 2018-10-31 ENCOUNTER — HOSPITAL ENCOUNTER (OUTPATIENT)
Dept: RADIATION ONCOLOGY | Age: 63
Discharge: HOME | End: 2018-10-31
Payer: COMMERCIAL

## 2018-10-31 VITALS
WEIGHT: 180.4 LBS | DIASTOLIC BLOOD PRESSURE: 88 MMHG | TEMPERATURE: 98 F | BODY MASS INDEX: 29.12 KG/M2 | HEART RATE: 75 BPM | OXYGEN SATURATION: 96 % | SYSTOLIC BLOOD PRESSURE: 148 MMHG

## 2018-10-31 DIAGNOSIS — C80.1 CANCER (HCC): Primary | ICD-10-CM

## 2018-10-31 PROCEDURE — 77412 RADIATION TX DELIVERY LVL 3: CPT

## 2018-10-31 PROCEDURE — 99999 PR OFFICE/OUTPT VISIT,PROCEDURE ONLY: CPT | Performed by: RADIOLOGY

## 2018-10-31 PROCEDURE — 77336 RADIATION PHYSICS CONSULT: CPT

## 2018-10-31 NOTE — PROGRESS NOTES
Brunilda Wayne Kang  10/31/2018  Wt Readings from Last 3 Encounters:   10/31/18 180 lb 6.4 oz (81.8 kg)   10/24/18 182 lb 3.2 oz (82.6 kg)   10/17/18 180 lb 9.6 oz (81.9 kg)     Body mass index is 29.12 kg/m². Treatment Area:left chest wall/apex/sclav with boost to follow    Patient was seen today for weekly visit. Comfort Alteration  KPS:80%  Fatigue: Mild    Nutritional Alteration  Anorexia: No   Nausea: No   Vomiting: No     Skin Alteration   Sensation:sensitive to touch and the wearing of a bar    Radiation Dermatitis:  Slightly reddened    Mucous Membrane Alteration  Drainage: No  Lymphedema: No    Emotional  Coping: effective    Sexuality Alteration  na    Injury, potential bleeding or infection: na        Lab Results   Component Value Date    WBC 5.7 03/13/2017     03/13/2017         BP (!) 148/88   Pulse 75   Temp 98 °F (36.7 °C) (Oral)   Wt 180 lb 6.4 oz (81.8 kg)   SpO2 96%   BMI 29.12 kg/m²   BP within normal range? no         Assessment/Plan:  Patient has received 20/30 fractions, 4000/5000cGy to chest wall, 3600cGy/4500 to apex/sclav without concerns-patient is napping in the afternoon for 45 minutes.   Charlotte Wang
180 lb 9.6 oz (81.9 kg)         ASSESSMENT/PLAN:     Patient is tolerating treatments well with expected toxicities. RBA were reviewed prior to first fraction and PRN. Current and planned dose reviewed. Goals of treatment and potential side effects were reviewed with the patient PRN. Treatment imaging has been personally reviewed for accuracy and precision. Questions answered to apparent satisfaction. Treatments will continue as planned. Toro Greer MD MS PIYUSHR  Radiation Oncologist        [facility] ([facility]): [phone] /// FAX: [phone]  [facility]): [phone] /// FAX: [phone]  [facility]): [phone] /// FAX: [phone]

## 2018-11-01 ENCOUNTER — HOSPITAL ENCOUNTER (OUTPATIENT)
Dept: RADIATION ONCOLOGY | Age: 63
Discharge: HOME | End: 2018-11-01
Payer: COMMERCIAL

## 2018-11-01 PROCEDURE — 77412 RADIATION TX DELIVERY LVL 3: CPT

## 2018-11-01 PROCEDURE — 77417 THER RADIOLOGY PORT IMAGE(S): CPT

## 2018-11-02 ENCOUNTER — TELEPHONE (OUTPATIENT)
Dept: ONCOLOGY | Age: 63
End: 2018-11-02

## 2018-11-02 PROCEDURE — 77412 RADIATION TX DELIVERY LVL 3: CPT

## 2018-11-05 PROCEDURE — 77412 RADIATION TX DELIVERY LVL 3: CPT

## 2018-11-06 PROCEDURE — 77300 RADIATION THERAPY DOSE PLAN: CPT

## 2018-11-06 PROCEDURE — 77334 RADIATION TREATMENT AID(S): CPT

## 2018-11-06 PROCEDURE — 77412 RADIATION TX DELIVERY LVL 3: CPT

## 2018-11-07 ENCOUNTER — HOSPITAL ENCOUNTER (OUTPATIENT)
Dept: RADIATION ONCOLOGY | Age: 63
Discharge: HOME | End: 2018-11-07
Payer: COMMERCIAL

## 2018-11-07 VITALS
BODY MASS INDEX: 29.04 KG/M2 | OXYGEN SATURATION: 97 % | HEART RATE: 77 BPM | SYSTOLIC BLOOD PRESSURE: 138 MMHG | WEIGHT: 179.9 LBS | DIASTOLIC BLOOD PRESSURE: 86 MMHG | TEMPERATURE: 98 F

## 2018-11-07 DIAGNOSIS — C80.1 CANCER (HCC): Primary | ICD-10-CM

## 2018-11-07 PROCEDURE — 77412 RADIATION TX DELIVERY LVL 3: CPT

## 2018-11-07 PROCEDURE — 77336 RADIATION PHYSICS CONSULT: CPT

## 2018-11-07 PROCEDURE — 77332 RADIATION TREATMENT AID(S): CPT

## 2018-11-07 PROCEDURE — 99999 PR OFFICE/OUTPT VISIT,PROCEDURE ONLY: CPT | Performed by: RADIOLOGY

## 2018-11-07 PROCEDURE — 77280 THER RAD SIMULAJ FIELD SMPL: CPT

## 2018-11-07 NOTE — PROGRESS NOTES
Aly Belt Teets  11/7/2018  Wt Readings from Last 3 Encounters:   11/07/18 179 lb 14.4 oz (81.6 kg)   10/31/18 180 lb 6.4 oz (81.8 kg)   10/24/18 182 lb 3.2 oz (82.6 kg)     Body mass index is 29.04 kg/m². Treatment Area:Left chest wall/apex/sclav    Patient was seen today for weekly visit. Comfort Alteration  KPS:90%  Fatigue: Mild    Nutritional Alteration  Anorexia: No   Nausea: No   Vomiting: No     Skin Alteration   Sensation:deep pain to axilla area    Radiation Dermatitis:  Redness/darkening-pain to treatment area    Mucous Membrane Alteration  Drainage: No  Lymphedema: Yes under arm/axilla area    Emotional  Coping: effective    Sexuality Alteration  na    Injury, potential bleeding or infection: na    Patient notes that she has noticed swallowing as an issue-she can not swallow anything over the size of a quarter-the throat does not hurt but she has noticed an issue-nursing coached patient on continuing taking small bites, possibly double swallowing, and sips of fluids verses gulps. Lab Results   Component Value Date    WBC 5.7 03/13/2017     03/13/2017         /86   Pulse 77   Temp 98 °F (36.7 °C) (Oral)   Wt 179 lb 14.4 oz (81.6 kg)   SpO2 97%   BMI 29.04 kg/m²   BP within normal range? yes          Assessment/Plan:Patient has completed 25 fractions, 5000 cGy to chest wall-4500cGy to apex/sclav-boost to start tomorrow.      Elizabeth Dugan

## 2018-11-08 PROCEDURE — 77412 RADIATION TX DELIVERY LVL 3: CPT

## 2018-11-12 PROCEDURE — 77412 RADIATION TX DELIVERY LVL 3: CPT

## 2018-11-13 PROCEDURE — 77412 RADIATION TX DELIVERY LVL 3: CPT

## 2018-11-14 ENCOUNTER — HOSPITAL ENCOUNTER (OUTPATIENT)
Dept: RADIATION ONCOLOGY | Age: 63
Discharge: HOME | End: 2018-11-14
Payer: COMMERCIAL

## 2018-11-14 VITALS
DIASTOLIC BLOOD PRESSURE: 102 MMHG | SYSTOLIC BLOOD PRESSURE: 162 MMHG | OXYGEN SATURATION: 98 % | WEIGHT: 182.7 LBS | HEART RATE: 72 BPM | TEMPERATURE: 98.4 F | BODY MASS INDEX: 29.49 KG/M2

## 2018-11-14 DIAGNOSIS — C80.1 CANCER (HCC): Primary | ICD-10-CM

## 2018-11-14 PROCEDURE — 77412 RADIATION TX DELIVERY LVL 3: CPT

## 2018-11-14 PROCEDURE — 99999 PR OFFICE/OUTPT VISIT,PROCEDURE ONLY: CPT | Performed by: RADIOLOGY

## 2018-11-14 NOTE — PROGRESS NOTES
Janet Greene Kang  11/14/2018  Wt Readings from Last 3 Encounters:   11/14/18 182 lb 11.2 oz (82.9 kg)   11/07/18 179 lb 14.4 oz (81.6 kg)   10/31/18 180 lb 6.4 oz (81.8 kg)     Body mass index is 29.49 kg/m². Treatment Area:left chest wall, apex, sclav with scar boost    Patient was seen today for weekly visit. Comfort Alteration  KPS:80%  Fatigue: Mild    Nutritional Alteration  Anorexia: No   Nausea: No   Vomiting: No     Skin Alteration   Sensation: redness-darkened with new pink skin underneath    Radiation Dermatitis:  yes    Mucous Membrane Alteration  Drainage: No  Lymphedema: No    Emotional  Coping: effective    Sexuality Alteration  na    Injury, potential bleeding or infection: na        Lab Results   Component Value Date    WBC 5.7 03/13/2017     03/13/2017         BP (!) 162/102   Pulse 72   Temp 98.4 °F (36.9 °C) (Oral)   Wt 182 lb 11.2 oz (82.9 kg)   SpO2 98%   BMI 29.49 kg/m²   BP within normal range? no   Pt states BP is higher related to the pain to treatment area and stretching arm out for actual treatment. Assessment/Plan: Patient has completed 5000cGy in 25 fractions to left chest wall, apex/sclav has received 4500cGy in 25-currently patient has completed 4/5 fractions to scar, 800 cGy/1000.     Unknown Helm

## 2018-11-14 NOTE — PROGRESS NOTES
DEPARTMENT OF RADIATION ONCOLOGY ON TREATMENT VISIT         11/14/2018      NAME:  Flower Kapadia    YOB: 1955      Diagnosis: left breast CA      SUBJECTIVE:   Maria E Nova has now received 5800 cGy in 29/30 fractions directed to the left CW and RLN. Past medical, surgical, social and family histories reviewed and updated as indicated. Pain: controlled      ALLERGIES:  Patient has no known allergies. Current Outpatient Prescriptions   Medication Sig Dispense Refill    silver sulfADIAZINE (SILVADENE) 1 % cream Apply topically daily. 85 g 3    Probiotic Product (PROBIOTIC-10) CAPS Take by mouth daily      vitamin C (ASCORBIC ACID) 500 MG tablet Take 500 mg by mouth daily      Cholecalciferol (VITAMIN D3) 5000 units TABS Take 5,000 Units by mouth daily      methylphenidate (RITALIN) 10 MG tablet Take 10 mg by mouth 2 times daily. Gayl Neth lisinopril (PRINIVIL;ZESTRIL) 10 MG tablet TAKE 1 TABLET DAILY 90 tablet 3    SUMAtriptan (IMITREX) 100 MG tablet Indications: Worsening of Migraine Headache TAKE 1 TABLET ONCE AS NEEDED FOR MIGRAINE FOR UP TO ONE DOSE 27 tablet 3    Calcium Carb-Cholecalciferol (CALCIUM 1000 + D) 1000-800 MG-UNIT TABS Take 1 tablet by mouth daily 90 tablet 1    b complex vitamins capsule Take 1 capsule by mouth daily.  Biotin 1000 MCG TABS Take 1 tablet by mouth daily.  acetaminophen (TYLENOL) 325 MG tablet Take 650 mg by mouth every 6 hours as needed for Pain.  naproxen sodium (ALEVE) 220 MG tablet Take 220 mg by mouth 2 times daily (with meals).  Phenylephrine-Acetaminophen (SINUS HEADACHE PE MAX ST) 5-325 MG TABS Take  by mouth as needed. No current facility-administered medications for this encounter. OBJECTIVE:  Alert and fully ambulatory. Pleasant and conversant.           Physical Examination: General appearance - alert, well appearing, and in no distress.  -skin grade 1-2          Wt Readings from Last 3

## 2018-11-15 ENCOUNTER — TELEPHONE (OUTPATIENT)
Dept: RADIATION ONCOLOGY | Age: 63
End: 2018-11-15

## 2018-11-15 PROCEDURE — 77336 RADIATION PHYSICS CONSULT: CPT

## 2018-11-15 PROCEDURE — 77412 RADIATION TX DELIVERY LVL 3: CPT

## 2018-11-21 ENCOUNTER — TELEPHONE (OUTPATIENT)
Dept: RADIATION ONCOLOGY | Age: 63
End: 2018-11-21

## 2018-12-18 ENCOUNTER — OFFICE VISIT (OUTPATIENT)
Dept: FAMILY MEDICINE CLINIC | Age: 63
End: 2018-12-18
Payer: COMMERCIAL

## 2018-12-18 VITALS
OXYGEN SATURATION: 97 % | TEMPERATURE: 98.5 F | SYSTOLIC BLOOD PRESSURE: 140 MMHG | BODY MASS INDEX: 29.06 KG/M2 | DIASTOLIC BLOOD PRESSURE: 80 MMHG | HEART RATE: 91 BPM | HEIGHT: 66 IN | WEIGHT: 180.8 LBS | RESPIRATION RATE: 16 BRPM

## 2018-12-18 DIAGNOSIS — B96.89 ACUTE BACTERIAL SINUSITIS: Primary | ICD-10-CM

## 2018-12-18 DIAGNOSIS — J01.90 ACUTE BACTERIAL SINUSITIS: Primary | ICD-10-CM

## 2018-12-18 PROCEDURE — 3017F COLORECTAL CA SCREEN DOC REV: CPT | Performed by: NURSE PRACTITIONER

## 2018-12-18 PROCEDURE — G8417 CALC BMI ABV UP PARAM F/U: HCPCS | Performed by: NURSE PRACTITIONER

## 2018-12-18 PROCEDURE — 3014F SCREEN MAMMO DOC REV: CPT | Performed by: NURSE PRACTITIONER

## 2018-12-18 PROCEDURE — G8427 DOCREV CUR MEDS BY ELIG CLIN: HCPCS | Performed by: NURSE PRACTITIONER

## 2018-12-18 PROCEDURE — 1036F TOBACCO NON-USER: CPT | Performed by: NURSE PRACTITIONER

## 2018-12-18 PROCEDURE — 99213 OFFICE O/P EST LOW 20 MIN: CPT | Performed by: NURSE PRACTITIONER

## 2018-12-18 PROCEDURE — G8484 FLU IMMUNIZE NO ADMIN: HCPCS | Performed by: NURSE PRACTITIONER

## 2018-12-18 RX ORDER — CEFDINIR 300 MG/1
300 CAPSULE ORAL 2 TIMES DAILY
Qty: 20 CAPSULE | Refills: 0 | Status: SHIPPED | OUTPATIENT
Start: 2018-12-18 | End: 2018-12-28

## 2018-12-18 RX ORDER — ANASTROZOLE 1 MG/1
1 TABLET ORAL DAILY
COMMUNITY
End: 2020-01-23 | Stop reason: SDUPTHER

## 2018-12-18 ASSESSMENT — ENCOUNTER SYMPTOMS
VOMITING: 0
RHINORRHEA: 1
SINUS PAIN: 1
VOICE CHANGE: 0
EYE REDNESS: 0
EYE DISCHARGE: 0
EYE ITCHING: 0
DIARRHEA: 0
SINUS PRESSURE: 1
COLOR CHANGE: 0
TROUBLE SWALLOWING: 0
WHEEZING: 0
SORE THROAT: 0
NAUSEA: 0
PHOTOPHOBIA: 0
ABDOMINAL PAIN: 0
STRIDOR: 0
SHORTNESS OF BREATH: 0
COUGH: 1
EYE PAIN: 0

## 2018-12-21 ENCOUNTER — TELEPHONE (OUTPATIENT)
Dept: RADIATION ONCOLOGY | Age: 63
End: 2018-12-21

## 2018-12-21 ENCOUNTER — HOSPITAL ENCOUNTER (OUTPATIENT)
Dept: RADIATION ONCOLOGY | Age: 63
Discharge: HOME | End: 2018-12-21
Payer: COMMERCIAL

## 2018-12-21 VITALS
OXYGEN SATURATION: 97 % | WEIGHT: 180.4 LBS | DIASTOLIC BLOOD PRESSURE: 92 MMHG | SYSTOLIC BLOOD PRESSURE: 142 MMHG | TEMPERATURE: 98.4 F | HEART RATE: 80 BPM | BODY MASS INDEX: 29.12 KG/M2

## 2018-12-21 DIAGNOSIS — C50.412 MALIGNANT NEOPLASM OF UPPER-OUTER QUADRANT OF LEFT BREAST IN FEMALE, ESTROGEN RECEPTOR POSITIVE (HCC): Primary | ICD-10-CM

## 2018-12-21 DIAGNOSIS — Z17.0 MALIGNANT NEOPLASM OF UPPER-OUTER QUADRANT OF LEFT BREAST IN FEMALE, ESTROGEN RECEPTOR POSITIVE (HCC): Primary | ICD-10-CM

## 2018-12-21 PROCEDURE — 99999 PR OFFICE/OUTPT VISIT,PROCEDURE ONLY: CPT | Performed by: NURSE PRACTITIONER

## 2018-12-21 RX ORDER — LANOLIN ALCOHOL/MO/W.PET/CERES
1000 CREAM (GRAM) TOPICAL DAILY
COMMUNITY
End: 2019-10-10

## 2018-12-21 NOTE — PROGRESS NOTES
RADIATION ONCOLOGY  5 week follow up         12/21/2018      NAME:  Collette Kapadia    YOB: 1955    CC: Pt returns today for re-assessment of radiation treatment area. Diagnosis:  pT2 (cT3?) pN1a cMo left breast IDC s/p MRM and AC+T (ER+, AK+, Her2-)    Subjective:  On 11/15/18, Collette Kapadia completed 5500 cGy in 30 fractions directed to the left chest wall for management of left breast cancer. States that's he is doing well. Notes no skin issues. Denies any new lumps or bumps.     Pt is following with Dr. Jose Garcia for medical oncology at [facility]. Started on Arimidex. Tolerating well. Next follow up in January 2019. Pain: Denies pain at this time. Past medical, surgical, social and family histories reviewed and updated as indicated. ALLERGIES:  Patient has no known allergies. Current Outpatient Prescriptions   Medication Sig Dispense Refill    anastrozole (ARIMIDEX) 1 MG tablet Take 1 mg by mouth daily      Cyanocobalamin (VITAMIN B12 PO) Take by mouth      cefdinir (OMNICEF) 300 MG capsule Take 1 capsule by mouth 2 times daily for 10 days 20 capsule 0    silver sulfADIAZINE (SILVADENE) 1 % cream Apply topically daily. 85 g 3    Probiotic Product (PROBIOTIC-10) CAPS Take by mouth daily      vitamin C (ASCORBIC ACID) 500 MG tablet Take 500 mg by mouth daily      Cholecalciferol (VITAMIN D3) 2000 units CAPS Take 5,000 Units by mouth daily      methylphenidate (RITALIN) 10 MG tablet Take 10 mg by mouth 2 times daily. John Paul Good lisinopril (PRINIVIL;ZESTRIL) 10 MG tablet TAKE 1 TABLET DAILY 90 tablet 3    SUMAtriptan (IMITREX) 100 MG tablet Indications:  Worsening of Migraine Headache TAKE 1 TABLET ONCE AS NEEDED FOR MIGRAINE FOR UP TO ONE DOSE 27 tablet 3    Calcium Carb-Cholecalciferol (CALCIUM 1000 + D) 1000-800 MG-UNIT TABS Take 1 tablet by mouth daily (Patient taking differently: Take 1 tablet by mouth daily ) 90 tablet 1    b complex vitamins capsule Take 1 capsule
left chest wall with scar boost, 6000 cGy in 30 fractions, with 4500 cGy in 25 fractions to the apex/sclav from 10/3/2018-11/7/2018. Patient states she is doing well, skin to treatment area has since healed-patient is working on weight loss now that treatment has ended. Patient states, \"I feel blessed today. \"               FALLS RISK SCREEN  Instructions:  Assess the patient and enter the appropriate indicators that are present for fall risk identification. Total the numbers entered and assign a fall risk score from Table 2.  Reassess patient at a minimum every 12 weeks or with status change. Assessment   Date  12/21/2018   1. Mental Ability: confusion/cognitively impaired 0 (3)   2. Elimination Issues: incontinence, frequency 0 (3)   3. Ambulatory: use of assistive devices (walker, cane, off-loading devices),        attached to equipment (IV pole, oxygen) 0 (2)   4.  Sensory Limitations: dizziness, vertigo, impaired vision 0 (3)   5. Age less than 65                 0 (0)   6. Age 72 or greater 0 (1)   7. Medication: diuretics, strong analgesics, hypnotics, sedatives,        antihypertensive agents 3 (3)   8. Falls:  recent history of falls within the last 3 months (not to include slipping or        tripping) 0 (7)   TOTAL 3  If score of 4 or greater was education given? No         TABLE 2   Risk Score Risk Level Plan of Care   0-3 Little or  No Risk 1. Provide assistance as indicated for ambulation activities  2. Reorient confused/cognitively impaired patient  3. Call-light/bell within patient's reach  4. Chair/bed in low position, stretcher/bed with siderails up except when performing patient care activities  5. Educate patient/family/caregiver on falls prevention  6.  Reassess in 12 weeks or with any noted change in patient condition which places them at a risk for a fall   4-6 Moderate Risk 1. Provide assistance as indicated for ambulation activities  2.   Reorient confused/cognitively impaired

## 2019-03-11 ENCOUNTER — OFFICE VISIT (OUTPATIENT)
Dept: FAMILY MEDICINE CLINIC | Age: 64
End: 2019-03-11
Payer: COMMERCIAL

## 2019-03-11 VITALS
HEART RATE: 76 BPM | DIASTOLIC BLOOD PRESSURE: 80 MMHG | TEMPERATURE: 98.3 F | HEIGHT: 66 IN | OXYGEN SATURATION: 97 % | RESPIRATION RATE: 16 BRPM | WEIGHT: 181 LBS | BODY MASS INDEX: 29.09 KG/M2 | SYSTOLIC BLOOD PRESSURE: 134 MMHG

## 2019-03-11 DIAGNOSIS — J01.90 ACUTE SINUSITIS, RECURRENCE NOT SPECIFIED, UNSPECIFIED LOCATION: Primary | ICD-10-CM

## 2019-03-11 PROCEDURE — G8417 CALC BMI ABV UP PARAM F/U: HCPCS | Performed by: PHYSICIAN ASSISTANT

## 2019-03-11 PROCEDURE — 3017F COLORECTAL CA SCREEN DOC REV: CPT | Performed by: PHYSICIAN ASSISTANT

## 2019-03-11 PROCEDURE — 1036F TOBACCO NON-USER: CPT | Performed by: PHYSICIAN ASSISTANT

## 2019-03-11 PROCEDURE — 99213 OFFICE O/P EST LOW 20 MIN: CPT | Performed by: PHYSICIAN ASSISTANT

## 2019-03-11 PROCEDURE — G8427 DOCREV CUR MEDS BY ELIG CLIN: HCPCS | Performed by: PHYSICIAN ASSISTANT

## 2019-03-11 PROCEDURE — G8484 FLU IMMUNIZE NO ADMIN: HCPCS | Performed by: PHYSICIAN ASSISTANT

## 2019-03-11 RX ORDER — AMOXICILLIN AND CLAVULANATE POTASSIUM 875; 125 MG/1; MG/1
1 TABLET, FILM COATED ORAL 2 TIMES DAILY
Qty: 20 TABLET | Refills: 0 | Status: SHIPPED | OUTPATIENT
Start: 2019-03-11 | End: 2019-03-21

## 2019-04-04 ENCOUNTER — HOSPITAL ENCOUNTER (OUTPATIENT)
Dept: RADIATION ONCOLOGY | Age: 64
Discharge: HOME | End: 2019-04-04
Payer: COMMERCIAL

## 2019-04-04 ENCOUNTER — TELEPHONE (OUTPATIENT)
Dept: CASE MANAGEMENT | Age: 64
End: 2019-04-04

## 2019-04-04 VITALS
WEIGHT: 181.6 LBS | DIASTOLIC BLOOD PRESSURE: 86 MMHG | HEART RATE: 70 BPM | OXYGEN SATURATION: 97 % | BODY MASS INDEX: 29.31 KG/M2 | SYSTOLIC BLOOD PRESSURE: 158 MMHG

## 2019-04-04 DIAGNOSIS — C80.1 CANCER (HCC): Primary | ICD-10-CM

## 2019-04-04 PROCEDURE — 99212 OFFICE O/P EST SF 10 MIN: CPT

## 2019-04-04 PROCEDURE — 99214 OFFICE O/P EST MOD 30 MIN: CPT | Performed by: RADIOLOGY

## 2019-04-04 RX ORDER — METAPROTERENOL SULFATE 10 MG
TABLET ORAL DAILY
COMMUNITY

## 2019-04-04 NOTE — PROGRESS NOTES
Corby Kapadia  4/4/2019  11:25 AM      Vitals:    04/04/19 1122   BP: (!) 158/86   Pulse: 70   SpO2: 97%    : Wt Readings from Last 1 Encounters:   04/04/19 181 lb 9.6 oz (82.4 kg)                Current Outpatient Medications:     Omega-3 Fatty Acids (OMEGA-3 FISH OIL) 500 MG CAPS, Take by mouth daily, Disp: , Rfl:     vitamin B-12 (CYANOCOBALAMIN) 1000 MCG tablet, Take 1,000 mcg by mouth daily, Disp: , Rfl:     anastrozole (ARIMIDEX) 1 MG tablet, Take 1 mg by mouth daily, Disp: , Rfl:     Probiotic Product (PROBIOTIC-10) CAPS, Take by mouth daily, Disp: , Rfl:     vitamin C (ASCORBIC ACID) 500 MG tablet, Take 500 mg by mouth daily, Disp: , Rfl:     Cholecalciferol (VITAMIN D3) 2000 units CAPS, Take 4,000 Units by mouth daily , Disp: , Rfl:     methylphenidate (RITALIN) 10 MG tablet, Take 10 mg by mouth 2 times daily. ., Disp: , Rfl:     lisinopril (PRINIVIL;ZESTRIL) 10 MG tablet, TAKE 1 TABLET DAILY, Disp: 90 tablet, Rfl: 3    SUMAtriptan (IMITREX) 100 MG tablet, Indications: Worsening of Migraine Headache TAKE 1 TABLET ONCE AS NEEDED FOR MIGRAINE FOR UP TO ONE DOSE, Disp: 27 tablet, Rfl: 3    Calcium Carb-Cholecalciferol (CALCIUM 1000 + D) 1000-800 MG-UNIT TABS, Take 1 tablet by mouth daily (Patient taking differently: Take 1 tablet by mouth daily ), Disp: 90 tablet, Rfl: 1    b complex vitamins capsule, Take 1 capsule by mouth daily. , Disp: , Rfl:     Biotin 5000 MCG CAPS, Take 1 tablet by mouth daily. , Disp: , Rfl:     acetaminophen (TYLENOL) 325 MG tablet, Take 650 mg by mouth every 6 hours as needed for Pain., Disp: , Rfl:     naproxen sodium (ALEVE) 220 MG tablet, Take 220 mg by mouth 2 times daily (with meals). , Disp: , Rfl:     Phenylephrine-Acetaminophen (SINUS HEADACHE PE MAX ST) 5-325 MG TABS, Take  by mouth as needed. , Disp: , Rfl:       Patient is seen today in follow up, 3 month check, having received radiation therapy to the left chest wall with boost, 6000 cGy in 30 fractions with or with any noted change in patient condition which places them at a risk for a fall   4-6 Moderate Risk 1. Provide assistance as indicated for ambulation activities  2. Reorient confused/cognitively impaired patient  3. Call-light/bell within patient's reach  4. Chair/bed in low position, stretcher/bed with siderails up except when performing patient care activities  5. Educate patient/family/caregiver on falls prevention  6. Falls risk precaution (Yellow sticker Level II) placed on patient chart   7 or   Higher High Risk 1. Place patient in easily observable treatment room  2. Patient attended at all times by family member or staff  3. Provide assistance as indicated for ambulation activities  4. Reorient confused/cognitively impaired patient  5. Call-light/bell within patient's reach  6. Chair/bed in low position, stretcher/bed with siderails up except when performing patient care activities  7. Educate patient/family/caregiver on falls prevention  8. Falls risk precaution (Yellow sticker Level III) placed on patient chart       NUTRITION RISK SCREEN    4/4/2019   Patient:  Lakisha Waddell  Sex:  female    NUTRITION RISK SCREEN  Instructions:  Assess the patient and enter the appropriate indicators that are present for nutrition risk identification. Total the numbers entered and assign a risk score. Follow the appropriate action for total score listed below. Assessment   Date  4/4/2019     1. Poor appetite?--a. One week or greater (1)                       b. One month or greater (2) 0        2. Unplanned wt loss?--a. Greater than 5# in 1 week(1)                                  b. Greater than 10# in 1 month (2)                                  c. Greater than 20# in 6 mos (1) 0        3. Diagnosis of Head or Neck Cancer? (2)   0    4. If yes, difficulty swallowing? (1) 0        5. Receiving nutrition via feeding tube or parenteral nutrition? (1) 0        6.   If yes, report of problems with feeding tube? (1) 0      TOTAL 0         Score of 0-1: No action  Score 2 or greater:  1. For Non-Diabetic Patient: Recommend adding Ensure Complete 2xdaily and provide              patient with Ensure wellness bag with coupons; For Diabetic Patient, Recommend adding Glucerna Shake 2xdaily and provide patient with Glucerna Wellness bag with coupons  2.  Route to the dietitian via [id number]

## 2019-04-04 NOTE — PROGRESS NOTES
Radiation Oncology   Follow Up Note  Vasu Montilla. Kaiser Garrett MD MS DABR      4/4/2019  Mine Luciano  Date of Service: 4/4/19         Diagnosis: pT2 (cT3?) pN1a cMo left breast IDC s/p MRM and AC+T              -ER+              -UT+              -Her2-            HPI:           Mrs. Laurel Carter is a pleasant 59year old woman with a history of left breast cancer who presents to discuss fractionated external beam radiation therapy as a component of definitve management in the post mastectomy setting. SCHMID, course and PATH reviewed. KPS 80-90. On 11/15/18, Thuan Kapadia completed 6000 cGy in 30 fractions directed to the left chest wall for management of left breast cancer. States that's he is doing well. Notes no skin issues. Denies any new lumps or bumps. Pt is following with Dr. Srikanth [facility]. Radha for Medical Oncology at [facility]. Started on Arimidex. Tolerating well and compliant. Next follow up in MAY. She notes mild in field skin changes that do not affect her QOL. She does note periodic dry cough that is improving (periodic not daily). She does note report LE or other LUE issues. KPS 80-90. [facility] CE reviewed.               -----        Per [facility]:        59year old female with invasive mammary carcinoma of the Left breast, multicentric, pathologic stage T2N1a, ER-positive, UT-positive and Her2/ying not amplified, s/p partial mastectomy with axillary lymph node dissection (2 out of 4 nodes positive) s/p ddAC --> T    HPI: 59year old female who presents with above diagnosis, for an opinion regarding the role of radiation therapy in the management of the patient's disease. Final recommendations will be communicated back to the requesting physician by way of the shared medical record, or letter to requesting physician via [address] mail. The patient reports on screening mammogram she was found to have an abnormality located in the Left breast. Mammogram/ultrasound demonstrated:   The 1.3 cm x 1.2 cm x 1.5 cm irregular mass in the left breast at 2   o'clock posterior depth is at a moderate suspicion for malignancy.  An   ultrasound guided biopsy is recommended. The 0.7 cm x 0.7 cm x 0.6 cm irregular mass in the left breast at 2   o'clock middle depth is at a moderate suspicion for malignancy.           -----        Pathology reviewed:     [facility] MRM PATH:       1. Left sentinel lymph node, sentinel node biopsy (A) - Metastatic carcinoma   involving one lymph node (1/1), metastasis measures 6 mm in greatest dimension   with 1 mm of extranodal extension     2. Left breast, mastectomy (B) - Multicentric invasive mammary carcinoma with   mixed ductal and lobular features, Marian grade 2, measuring 2.3 cm in   greatest dimension (please see comment and synoptic report)  - Ductal carcinoma in situ, low and intermediate nuclear grades, solid and   cribriform types with focal comedonecrosis  - Lobular neoplasia (atypical lobular hyperplasia/lobular carcinoma in situ)  - Lymphovascular space invasion is identified  - Marked proliferative epithelial changes including radial sclerosing lesions,   intraductal papillomas, sclerosing adenosis, and florid usual ductal hyperplasia   with microcalcifications  - Biopsy clips x2  - Stromal scar consistent with history of remote surgical procedure  - Skin with seborrheic keratoses    3.  Right breast, prophylactic mastectomy (C) - Atypical lobular hyperplasia  - Marked proliferative epithelial changes including sclerosing adenosis, florid   usual ductal hyperplasia, and radial scars  - Skin with seborrheic keratosis         SYNOPTIC REPORT OF KEY PATHOLOGIC FINDINGS    LEFT BREAST:       BREAST INVASIVE CARCINOMA WORKSHEET        Part: A and B  Procedure:         Total mastectomy (including nipple-sparing and skin-sparing  mastectomy)  Specimen Laterality:         Left  Tumor size: Size of largest invasive carcinoma:         Greatest dimension of largest focus of invasion >1 mm: 23 mm  Tumor Focality:         Multiple foci of invasive carcinoma  Histologic Type of Invasive Carcinoma:         Invasive carcinoma with ductal and lobular features (mixed type  carcinoma)  Histologic Grade:         Glandular (Acinar) / Tubular Differentiation:        Score 3        Nuclear Pleomorphism:        Score 2        Mitotic Rate:        Score 1 (<=3 mitosis per mm2)        Overall Grade:        Grade II  Ductal Carcinoma In Situ:         DCIS is present in specimen        Negative for extensive intraductal component (EIC)  Architectural Patterns:         Architectural Pattern:Cribriform        Architectural Pattern:Solid  DCIS Nuclear Grade:         Grade II (intermediate)  DCIS Necrosis:         Present, central (expansive \"comedo\" necrosis)  Tumor Extension:   Skin:         Uninvolved  Nipple:         Uninvolved  Skeletal muscle:         Not applicable  Invasive Carcinoma Margins:         Margins uninvolved by invasive carcinoma        Distance from closest margin: 1 mm        Closest margin: Deep  DCIS Margins:         Margins uninvolved by DCIS        Distance from closest margin: 10 mm        Closest margin: Deep  Lymph Nodes:         Number of lymph nodes with macrometastases (>2 mm): 1        Number of lymph nodes with micrometastases (>0.2 mm to 2 mm and/or  >200 cells): 0        Number of lymph nodes with isolated tumor cells (<= 0.2 mm and <= 200  cells): 0        Size of largest metastatic deposit: 6 mm        Extranodal extension present: 1 mm        Number of lymph nodes examined: 1        Number of sentinel lymph nodes examined (required only if sentinel  nodes are present): 1  Treatment Effect:         No known presurgical therapy  Lymph-Vascular Invasion:         Present  Pathologic Stage Classification (pTNM,AJCC 8th ed)  TNM Descriptor(s):         m (multiple foci of invasive carcinoma)  Primary Tumor (Invasive Carcinoma) (pT):         pT2  Regional Lymph Nodes (pN):         Modifier:       (sn): [place] node(s) evaluated  Category (pN):         pN1a  Distant metastasis:         Distant Metastasis (pM) Not applicable/Not confirmed pathologically  in this case           -----               Past Medical History:   Diagnosis Date    Breast cancer ([facility] 2018    Cancer ([facility]     Hypertension     Migraine headache 3/2/2015    Obesity (BMI 30.0-34.9) 3/20/2017         Past Surgical History:   Procedure Laterality Date    BREAST SURGERY Bilateral     biopsies; non malignant    CHOLECYSTECTOMY      TONSILLECTOMY           Social History     Socioeconomic History    Marital status:      Spouse name: Not on file    Number of children: Not on file    Years of education: Not on file    Highest education level: Not on file   Occupational History    Not on file   Social Needs    Financial resource strain: Not on file    Food insecurity:     Worry: Not on file     Inability: Not on file    Transportation needs:     Medical: Not on file     Non-medical: Not on file   Tobacco Use    Smoking status: Former Smoker     Packs/day: 0.50     Years: 12.00     Pack years: 6.00     Types: Cigarettes     Last attempt to quit: 1986     Years since quittin.6    Smokeless tobacco: Never Used   Substance and Sexual Activity    Alcohol use: No     Alcohol/week: 0.0 oz     Comment: <1 drink/week    Drug use: No    Sexual activity: Yes     Partners: Male     Comment:  since ; postmenopausal   Lifestyle    Physical activity:     Days per week: Not on file     Minutes per session: Not on file    Stress: Not on file   Relationships    Social connections:     Talks on phone: Not on file     Gets together: Not on file     Attends Sabianism service: Not on file     Active member of club or organization: Not on file     Attends meetings of clubs or organizations: Not on file     Relationship status: Not on file    Intimate partner violence:     Fear of current or ex partner: Not on file     Emotionally abused: Not on file     Physically abused: Not on file     Forced sexual activity: Not on file   Other Topics Concern    Not on file   Social History Narrative    L[facility] in [place] with  Maria L Edwards /  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 Family History   Problem Relation Age of Onset   [facility] Cancer Mother         Breast CA    Heart Disease Mother     Diabetes Father     Other Father         Pancreatitis    Cancer Sister         Leukemia    Cancer Paternal Uncle         pancreativc for 1/colon for 2nd    Cancer Sister         Breast CA       Allergies:   Patient has no known allergies. Current Outpatient Medications   Medication Sig Dispense Refill    Omega-3 Fatty Acids (OMEGA-3 FISH OIL) 500 MG CAPS Take by mouth daily      vitamin B-12 (CYANOCOBALAMIN) 1000 MCG tablet Take 1,000 mcg by mouth daily      anastrozole (ARIMIDEX) 1 MG tablet Take 1 mg by mouth daily      Probiotic Product (PROBIOTIC-10) CAPS Take by mouth daily      vitamin C (ASCORBIC ACID) 500 MG tablet Take 500 mg by mouth daily      Cholecalciferol (VITAMIN D3) 2000 units CAPS Take 4,000 Units by mouth daily       methylphenidate (RITALIN) 10 MG tablet Take 10 mg by mouth 2 times daily. Lisa Villarreal lisinopril (PRINIVIL;ZESTRIL) 10 MG tablet TAKE 1 TABLET DAILY 90 tablet 3    SUMAtriptan (IMITREX) 100 MG tablet Indications: Worsening of Migraine Headache TAKE 1 TABLET ONCE AS NEEDED FOR MIGRAINE FOR UP TO ONE DOSE 27 tablet 3    Calcium Carb-Cholecalciferol (CALCIUM 1000 + D) 1000-800 MG-UNIT TABS Take 1 tablet by mouth daily (Patient taking differently: Take 1 tablet by mouth daily ) 90 tablet 1    b complex vitamins capsule Take 1 capsule by mouth daily.  Biotin 5000 MCG CAPS Take 1 tablet by mouth daily.  acetaminophen (TYLENOL) 325 MG tablet Take 650 mg by mouth every 6 hours as needed for Pain.  naproxen sodium (ALEVE) 220 MG tablet Take 220 mg by mouth 2 times daily (with meals).       Phenylephrine-Acetaminophen (SINUS HEADACHE PE MAX ST) 5-325 MG TABS Take  by mouth as needed. No current facility-administered medications for this encounter. REVIEW OF SYSTEMS  History obtained from chart review and the patient  General ROS: fatigue  Psychological ROS: negative  Ophthalmic ROS: negative  ENT ROS: negative  Allergy and Immunology ROS: negative  Hematological and Lymphatic ROS: negative  Endocrine ROS: negative  Breast ROS: negative for CW masses or LNs  Respiratory ROS: no cough, shortness of breath, or wheezing  Cardiovascular ROS: no chest pain or dyspnea on exertion  Gastrointestinal ROS: no abdominal pain, change in bowel habits, or black or bloody stools  Genito-Urinary ROS: no dysuria, trouble voiding, or hematuria  Musculoskeletal ROS: negative  Neurological ROS: no TIA or stroke symptoms  Dermatological ROS: negative        Physical Exam   Constitutional: She is oriented to person, place, and time. She appears well-developed. HENT:   Head: Normocephalic. Eyes: Pupils are equal, round, and reactive to light. Neck: Normal range of motion. Cardiovascular: Normal rate, regular rhythm and intact distal pulses. Abdominal: Soft. Musculoskeletal: Normal range of motion. She exhibits no edema. Neurological: She is alert and oriented to person, place, and time. Skin: Skin is warm. Psychiatric: She has a normal mood and affect. Her behavior is normal. Judgment and thought content normal.           A/P:        Chalino Hoffmann is a claude 59year old woman with locally advanced left breast cancer s/p MRM. Systemic Tx and fractionated external beam radiation therapy. There is no evidence of disease recurrence or progression based on a detailed review of the available imaging, physical exam, and ROS (all personally performed prior to and during this follow up appointment today).   The patient did verbalize understanding for the indications of continued FU including imaging and laboratory evaluation as applicable to this case; as well as appropriate lifestyle choices and health maintenance. All questions answered to the best of my ability. Early delayed or chronic RT grade 1 (skin). *I spent at least 28 MIN on this case and with this patient today including personally performing/reviewing the history, ROS, PE, and providing a summary and description of the detailed medical decision making as a basis for any and all recommendations made today (+/- a pertinent RBA discussion): literature and radiology reviews were performed as noted and applicable (88% or more time was spent in direct patient ). -FU  [address], defer imaging to [address]  -FU 6 month NP        Rita Hilario. Spencer Cadena MD [place] [address] Oncology  Cell: [phone]  [facility]:  [phone]   FAX: [phone] [facility]:   [address] [place] [address] Avenue:    [phone]  [address] [place] [address] Road:  [phone]   FAX:  [phone]  Email: [email or web address]      NOTE: This report was transcribed using voice recognition software. Every effort was made to ensure accuracy; however, inadvertent computerized transcription errors may be present.

## 2019-04-04 NOTE — TELEPHONE ENCOUNTER
Met with patient prior to her follow up appointment with Dr. Napoleon Osorio today. Patient completed her active treatment November 2018 for her breast cancer. Patient appears in good spirits. States she is doing good and feels well. Her skin is all healed since finishing treatment. Provided support and encouragement. Instructed patient in detail on his Cancer Treatment Summary and Survivorship Care Plan. Copies faxed to patient's PCP. Provided written copies of both plus Patient Resource: Cancer Survivorship: A Guide for Patients and Their Families booklet,  Local Resources for Cancer Survivors, [address] at [employer]. Instructed to call with any questions or concerns. Verbally agreed. Patient appreciative of visit and information.

## 2019-05-03 ENCOUNTER — TELEPHONE (OUTPATIENT)
Dept: FAMILY MEDICINE CLINIC | Age: 64
End: 2019-05-03

## 2019-05-03 DIAGNOSIS — E78.5 DYSLIPIDEMIA (HIGH LDL; LOW HDL): ICD-10-CM

## 2019-05-03 DIAGNOSIS — E55.9 VITAMIN D INSUFFICIENCY: ICD-10-CM

## 2019-05-03 DIAGNOSIS — I10 ESSENTIAL HYPERTENSION: Primary | ICD-10-CM

## 2019-05-16 ENCOUNTER — OFFICE VISIT (OUTPATIENT)
Dept: FAMILY MEDICINE CLINIC | Age: 64
End: 2019-05-16
Payer: COMMERCIAL

## 2019-05-16 VITALS
HEART RATE: 81 BPM | SYSTOLIC BLOOD PRESSURE: 130 MMHG | HEIGHT: 65 IN | OXYGEN SATURATION: 97 % | TEMPERATURE: 98.3 F | BODY MASS INDEX: 29.16 KG/M2 | RESPIRATION RATE: 16 BRPM | DIASTOLIC BLOOD PRESSURE: 88 MMHG | WEIGHT: 175 LBS

## 2019-05-16 DIAGNOSIS — R05.9 COUGH: ICD-10-CM

## 2019-05-16 DIAGNOSIS — J06.9 ACUTE URI: Primary | ICD-10-CM

## 2019-05-16 PROCEDURE — 1036F TOBACCO NON-USER: CPT | Performed by: NURSE PRACTITIONER

## 2019-05-16 PROCEDURE — G8427 DOCREV CUR MEDS BY ELIG CLIN: HCPCS | Performed by: NURSE PRACTITIONER

## 2019-05-16 PROCEDURE — 3017F COLORECTAL CA SCREEN DOC REV: CPT | Performed by: NURSE PRACTITIONER

## 2019-05-16 PROCEDURE — G8417 CALC BMI ABV UP PARAM F/U: HCPCS | Performed by: NURSE PRACTITIONER

## 2019-05-16 PROCEDURE — 99213 OFFICE O/P EST LOW 20 MIN: CPT | Performed by: NURSE PRACTITIONER

## 2019-05-16 RX ORDER — GUAIFENESIN AND CODEINE PHOSPHATE 100; 10 MG/5ML; MG/5ML
10 SOLUTION ORAL EVERY 4 HOURS PRN
Qty: 150 ML | Refills: 0 | Status: SHIPPED | OUTPATIENT
Start: 2019-05-16 | End: 2019-05-21

## 2019-05-16 RX ORDER — CEFDINIR 300 MG/1
300 CAPSULE ORAL 2 TIMES DAILY
Qty: 20 CAPSULE | Refills: 0 | Status: SHIPPED | OUTPATIENT
Start: 2019-05-16 | End: 2019-05-26

## 2019-05-16 ASSESSMENT — ENCOUNTER SYMPTOMS
DIARRHEA: 0
ABDOMINAL PAIN: 0
COUGH: 1
EYE DISCHARGE: 0
SINUS PAIN: 0
COLOR CHANGE: 0
EYE REDNESS: 0
TROUBLE SWALLOWING: 0
WHEEZING: 0
EYE PAIN: 0
SORE THROAT: 0
RHINORRHEA: 1
SHORTNESS OF BREATH: 0
NAUSEA: 0
VOICE CHANGE: 0
STRIDOR: 0
EYE ITCHING: 0
VOMITING: 0
SINUS PRESSURE: 0
PHOTOPHOBIA: 0

## 2019-05-16 NOTE — PROGRESS NOTES
Bianca Sanon is a 59 y.o. female who presents today for   Chief Complaint   Patient presents with    URI         HPI    Upper Respiratory Infection  Patient complains of symptoms of a URI. Symptoms include congestion, cough and chills. Onset of symptoms was several days ago, gradually worsening since that time. She also c/o productive cough with  yellow colored sputum for the past few days . She is drinking plenty of fluids. Evaluation to date: none. Treatment to date: oral decongestant. [address]:  Patient's past medical, surgical, social and/or family history reviewed, updated in chart, and are non-contributory (unless otherwise stated). Medications and allergies also reviewed and updated in chart. Review of Systems  Review of Systems   Constitutional: Positive for chills. Negative for appetite change, diaphoresis and fever. HENT: Positive for congestion and rhinorrhea. Negative for ear discharge, ear pain, mouth sores, nosebleeds, postnasal drip, sinus pressure, sinus pain, sneezing, sore throat, trouble swallowing and voice change. Eyes: Negative for photophobia, pain, discharge, redness, itching and visual disturbance. Respiratory: Positive for cough. Negative for shortness of breath, wheezing and stridor. Cardiovascular: Negative for chest pain, palpitations and leg swelling. Gastrointestinal: Negative for abdominal pain, diarrhea, nausea and vomiting. Skin: Negative for color change, pallor and rash. Physical Exam:    VS:  /88   Pulse 81   Temp 98.3 °F (36.8 °C) (Oral)   Resp 16   Ht 5' 5\" (1.651 m)   Wt 175 lb (79.4 kg)   SpO2 97%   BMI 29.12 kg/m²   LAST WEIGHT:  Wt Readings from Last 3 Encounters:   05/16/19 175 lb (79.4 kg)   04/04/19 181 lb 9.6 oz (82.4 kg)   03/11/19 181 lb (82.1 kg)     Physical Exam   Constitutional: She appears well-developed and well-nourished. No distress. HENT:   Head: Normocephalic and atraumatic.    Right Ear: External ear normal.   Left Ear: External ear normal.   Mouth/Throat: Oropharynx is clear and moist. No oropharyngeal exudate. Moderate erythema/edema present to nasal mucosa, light yellow nasal drainage present, no sinus tenderness or edema, no PND   Eyes: Pupils are equal, round, and reactive to light. Conjunctivae and EOM are normal. Right eye exhibits no discharge. Left eye exhibits no discharge. Neck: Normal range of motion. Neck supple. No JVD present. Cardiovascular: Normal rate, regular rhythm, normal heart sounds and intact distal pulses. No peripheral edema   Pulmonary/Chest: Effort normal and breath sounds normal. No stridor. No respiratory distress. She has no wheezes. She has no rales. She exhibits no tenderness. Productive cough w/yellow mucus   Abdominal: Soft. Bowel sounds are normal. She exhibits no distension. There is no tenderness. Lymphadenopathy:     She has no cervical adenopathy. Skin: Skin is warm and dry. No rash noted. She is not diaphoretic. No erythema. No pallor. Nursing note and vitals reviewed. Assessment / Plan:      Sera Carrasco was seen today for uri. Diagnoses and all orders for this visit:    Acute URI  Advised on sedating effects of Cheratussin  -     cefdinir (OMNICEF) 300 MG capsule; Take 1 capsule by mouth 2 times daily for 10 days  -     guaiFENesin-codeine (CHERATUSSIN AC) 100-10 MG/5ML syrup; Take 10 mLs by mouth every 4 hours as needed for Cough for up to 5 days. Cough  -     cefdinir (OMNICEF) 300 MG capsule; Take 1 capsule by mouth 2 times daily for 10 days  -     guaiFENesin-codeine (CHERATUSSIN AC) 100-10 MG/5ML syrup; Take 10 mLs by mouth every 4 hours as needed for Cough for up to 5 days. Controlled Substances Monitoring:     RX Monitoring 5/16/2019   Attestation The Prescription Monitoring Report for this patient was reviewed today. Call or go to ED immediately if symptoms worsen or persist.    Return if symptoms worsen or fail to improve. ,

## 2019-07-18 ENCOUNTER — HOSPITAL ENCOUNTER (OUTPATIENT)
Age: 64
Discharge: HOME | End: 2019-07-20
Payer: COMMERCIAL

## 2019-07-18 ENCOUNTER — NURSE ONLY (OUTPATIENT)
Dept: FAMILY MEDICINE CLINIC | Age: 64
End: 2019-07-18
Payer: COMMERCIAL

## 2019-07-18 DIAGNOSIS — E78.5 DYSLIPIDEMIA (HIGH LDL; LOW HDL): ICD-10-CM

## 2019-07-18 DIAGNOSIS — E55.9 VITAMIN D INSUFFICIENCY: ICD-10-CM

## 2019-07-18 DIAGNOSIS — I10 ESSENTIAL HYPERTENSION: ICD-10-CM

## 2019-07-18 LAB
ALBUMIN SERPL-MCNC: 4.2 G/DL (ref 3.5–5.2)
ALP BLD-CCNC: 115 U/L (ref 35–104)
ALT SERPL-CCNC: 23 U/L (ref 0–32)
ANION GAP SERPL CALCULATED.3IONS-SCNC: 14 MMOL/L (ref 7–16)
AST SERPL-CCNC: 20 U/L (ref 0–31)
BASOPHILS ABSOLUTE: 0.04 E9/L (ref 0–0.2)
BASOPHILS RELATIVE PERCENT: 0.8 % (ref 0–2)
BILIRUB SERPL-MCNC: 0.3 MG/DL (ref 0–1.2)
BUN BLDV-MCNC: 14 MG/DL (ref 8–23)
CALCIUM SERPL-MCNC: 9.9 MG/DL (ref 8.6–10.2)
CHLORIDE BLD-SCNC: 107 MMOL/L (ref 98–107)
CHOLESTEROL, TOTAL: 205 MG/DL (ref 0–199)
CO2: 24 MMOL/L (ref 22–29)
CREAT SERPL-MCNC: 0.6 MG/DL (ref 0.5–1)
EOSINOPHILS ABSOLUTE: 0.17 E9/L (ref 0.05–0.5)
EOSINOPHILS RELATIVE PERCENT: 3.5 % (ref 0–6)
GFR AFRICAN AMERICAN: >60
GFR NON-AFRICAN AMERICAN: >60 ML/MIN/1.73
GLUCOSE BLD-MCNC: 98 MG/DL (ref 74–99)
HCT VFR BLD CALC: 42.4 % (ref 34–48)
HDLC SERPL-MCNC: 61 MG/DL
HEMOGLOBIN: 13.3 G/DL (ref 11.5–15.5)
IMMATURE GRANULOCYTES #: 0.02 E9/L
IMMATURE GRANULOCYTES %: 0.4 % (ref 0–5)
LDL CHOLESTEROL CALCULATED: 120 MG/DL (ref 0–99)
LYMPHOCYTES ABSOLUTE: 0.79 E9/L (ref 1.5–4)
LYMPHOCYTES RELATIVE PERCENT: 16.3 % (ref 20–42)
MCH RBC QN AUTO: 30.3 PG (ref 26–35)
MCHC RBC AUTO-ENTMCNC: 31.4 % (ref 32–34.5)
MCV RBC AUTO: 96.6 FL (ref 80–99.9)
MONOCYTES ABSOLUTE: 0.37 E9/L (ref 0.1–0.95)
MONOCYTES RELATIVE PERCENT: 7.6 % (ref 2–12)
NEUTROPHILS ABSOLUTE: 3.46 E9/L (ref 1.8–7.3)
NEUTROPHILS RELATIVE PERCENT: 71.4 % (ref 43–80)
PDW BLD-RTO: 13.8 FL (ref 11.5–15)
PLATELET # BLD: 222 E9/L (ref 130–450)
PMV BLD AUTO: 11.4 FL (ref 7–12)
POTASSIUM SERPL-SCNC: 4.9 MMOL/L (ref 3.5–5)
RBC # BLD: 4.39 E12/L (ref 3.5–5.5)
SODIUM BLD-SCNC: 145 MMOL/L (ref 132–146)
TOTAL PROTEIN: 7.5 G/DL (ref 6.4–8.3)
TRIGL SERPL-MCNC: 119 MG/DL (ref 0–149)
TSH SERPL DL<=0.05 MIU/L-ACNC: 0.78 UIU/ML (ref 0.27–4.2)
VITAMIN D 25-HYDROXY: 69 NG/ML (ref 30–100)
VLDLC SERPL CALC-MCNC: 24 MG/DL
WBC # BLD: 4.9 E9/L (ref 4.5–11.5)

## 2019-07-18 PROCEDURE — 80053 COMPREHEN METABOLIC PANEL: CPT

## 2019-07-18 PROCEDURE — 85025 COMPLETE CBC W/AUTO DIFF WBC: CPT

## 2019-07-18 PROCEDURE — 36415 COLL VENOUS BLD VENIPUNCTURE: CPT | Performed by: FAMILY MEDICINE

## 2019-07-18 PROCEDURE — 82306 VITAMIN D 25 HYDROXY: CPT

## 2019-07-18 PROCEDURE — 84443 ASSAY THYROID STIM HORMONE: CPT

## 2019-07-18 PROCEDURE — 80061 LIPID PANEL: CPT

## 2019-07-26 ENCOUNTER — OFFICE VISIT (OUTPATIENT)
Dept: FAMILY MEDICINE CLINIC | Age: 64
End: 2019-07-26
Payer: COMMERCIAL

## 2019-07-26 VITALS
SYSTOLIC BLOOD PRESSURE: 120 MMHG | BODY MASS INDEX: 28.28 KG/M2 | WEIGHT: 176 LBS | HEIGHT: 66 IN | DIASTOLIC BLOOD PRESSURE: 80 MMHG | OXYGEN SATURATION: 97 % | TEMPERATURE: 97.9 F | HEART RATE: 70 BPM

## 2019-07-26 DIAGNOSIS — Z00.00 ANNUAL PHYSICAL EXAM: Primary | ICD-10-CM

## 2019-07-26 DIAGNOSIS — I10 ESSENTIAL HYPERTENSION: ICD-10-CM

## 2019-07-26 DIAGNOSIS — Z12.11 COLON CANCER SCREENING: ICD-10-CM

## 2019-07-26 DIAGNOSIS — Z00.00 HEALTH CARE MAINTENANCE: ICD-10-CM

## 2019-07-26 PROBLEM — E66.9 OBESITY (BMI 30.0-34.9): Status: RESOLVED | Noted: 2017-03-20 | Resolved: 2019-07-26

## 2019-07-26 PROBLEM — E66.811 OBESITY (BMI 30.0-34.9): Status: RESOLVED | Noted: 2017-03-20 | Resolved: 2019-07-26

## 2019-07-26 PROCEDURE — 99396 PREV VISIT EST AGE 40-64: CPT | Performed by: FAMILY MEDICINE

## 2019-07-26 RX ORDER — LISINOPRIL 10 MG/1
TABLET ORAL
Qty: 90 TABLET | Refills: 3 | Status: SHIPPED | OUTPATIENT
Start: 2019-07-26 | End: 2020-01-09 | Stop reason: SDUPTHER

## 2019-07-26 ASSESSMENT — PATIENT HEALTH QUESTIONNAIRE - PHQ9
1. LITTLE INTEREST OR PLEASURE IN DOING THINGS: 0
2. FEELING DOWN, DEPRESSED OR HOPELESS: 0
SUM OF ALL RESPONSES TO PHQ QUESTIONS 1-9: 0
SUM OF ALL RESPONSES TO PHQ9 QUESTIONS 1 & 2: 0
SUM OF ALL RESPONSES TO PHQ QUESTIONS 1-9: 0

## 2019-07-26 ASSESSMENT — ENCOUNTER SYMPTOMS
ORTHOPNEA: 0
COUGH: 0
DOUBLE VISION: 0
BACK PAIN: 0
SHORTNESS OF BREATH: 0
CONSTIPATION: 0
ABDOMINAL PAIN: 0
SPUTUM PRODUCTION: 0
NAUSEA: 0
DIARRHEA: 0
BLOOD IN STOOL: 0
WHEEZING: 0
BLURRED VISION: 0
HEARTBURN: 0
SORE THROAT: 0
VOMITING: 0

## 2019-07-26 NOTE — PROGRESS NOTES
exhibits no tenderness. Abdominal: Soft. Bowel sounds are normal. She exhibits no distension and no mass. There is no tenderness. There is no guarding. Musculoskeletal: Normal range of motion. She exhibits no edema or tenderness. Lymphadenopathy:     She has no cervical adenopathy. Neurological: She is alert and oriented to person, place, and time. Skin: Skin is warm and dry. No rash noted. She is not diaphoretic. No erythema. No pallor. Psychiatric: She has a normal mood and affect. Her behavior is normal. Thought content normal.       Labs:  Lab Results   Component Value Date     07/18/2019    K 4.9 07/18/2019     07/18/2019    CO2 24 07/18/2019    BUN 14 07/18/2019    CREATININE 0.6 07/18/2019    PROT 7.5 07/18/2019    LABALBU 4.2 07/18/2019    CALCIUM 9.9 07/18/2019    GFRAA >60 07/18/2019    LABGLOM >60 07/18/2019    GLUCOSE 98 07/18/2019    AST 20 07/18/2019    ALT 23 07/18/2019    ALKPHOS 115 07/18/2019    BILITOT 0.3 07/18/2019    TSH 0.778 07/18/2019    CHOL 205 07/18/2019    TRIG 119 07/18/2019    HDL 61 07/18/2019    LDLCALC 120 07/18/2019        Lab Results   Component Value Date    CHOL 205 (H) 07/18/2019    CHOL 190 03/13/2017    CHOL 210 (H) 02/25/2016     Lab Results   Component Value Date    TRIG 119 07/18/2019    TRIG 76 03/13/2017    TRIG 95 02/25/2016     Lab Results   Component Value Date    HDL 61 07/18/2019    HDL 79 03/13/2017    HDL 81 02/25/2016     Lab Results   Component Value Date    LDLCALC 120 (H) 07/18/2019    LDLCALC 96 03/13/2017    LDLCALC 110 (H) 02/25/2016       No results found for: LABA1C  Lab Results   Component Value Date    LDLCALC 120 (H) 07/18/2019    CREATININE 0.6 07/18/2019         Assessment / Plan:      Gabriela Stephen was seen today for annual exam, medication refill and discuss labs. Diagnoses and all orders for this visit:    Annual physical exam    Essential hypertension  -     lisinopril (PRINIVIL;ZESTRIL) 10 MG tablet;  Indications: High Blood

## 2019-08-25 PROBLEM — Z00.00 HEALTH CARE MAINTENANCE: Status: RESOLVED | Noted: 2019-07-26 | Resolved: 2019-08-25

## 2019-10-10 ENCOUNTER — HOSPITAL ENCOUNTER (OUTPATIENT)
Dept: RADIATION ONCOLOGY | Age: 64
Discharge: HOME | End: 2019-10-10
Payer: COMMERCIAL

## 2019-10-10 VITALS
BODY MASS INDEX: 29.04 KG/M2 | SYSTOLIC BLOOD PRESSURE: 140 MMHG | HEART RATE: 68 BPM | DIASTOLIC BLOOD PRESSURE: 84 MMHG | WEIGHT: 179.9 LBS | OXYGEN SATURATION: 98 % | RESPIRATION RATE: 18 BRPM

## 2019-10-10 DIAGNOSIS — C50.919 MALIGNANT NEOPLASM OF FEMALE BREAST, UNSPECIFIED ESTROGEN RECEPTOR STATUS, UNSPECIFIED LATERALITY, UNSPECIFIED SITE OF BREAST (HCC): Primary | ICD-10-CM

## 2019-10-10 PROCEDURE — 99212 OFFICE O/P EST SF 10 MIN: CPT

## 2019-10-10 PROCEDURE — 99213 OFFICE O/P EST LOW 20 MIN: CPT | Performed by: RADIOLOGY

## 2020-01-09 ENCOUNTER — TELEPHONE (OUTPATIENT)
Dept: FAMILY MEDICINE CLINIC | Age: 65
End: 2020-01-09

## 2020-01-09 RX ORDER — LISINOPRIL 10 MG/1
TABLET ORAL
Qty: 90 TABLET | Refills: 3 | Status: SHIPPED
Start: 2020-01-09 | End: 2020-08-19 | Stop reason: SDUPTHER

## 2020-01-23 ENCOUNTER — HOSPITAL ENCOUNTER (OUTPATIENT)
Dept: GENERAL RADIOLOGY | Age: 65
Discharge: HOME | End: 2020-01-25
Payer: COMMERCIAL

## 2020-01-23 ENCOUNTER — OFFICE VISIT (OUTPATIENT)
Dept: ONCOLOGY | Age: 65
End: 2020-01-23
Payer: COMMERCIAL

## 2020-01-23 ENCOUNTER — HOSPITAL ENCOUNTER (OUTPATIENT)
Dept: INFUSION THERAPY | Age: 65
Discharge: HOME | End: 2020-01-23
Payer: COMMERCIAL

## 2020-01-23 VITALS
TEMPERATURE: 98.4 F | WEIGHT: 179.7 LBS | SYSTOLIC BLOOD PRESSURE: 191 MMHG | OXYGEN SATURATION: 97 % | HEART RATE: 87 BPM | DIASTOLIC BLOOD PRESSURE: 91 MMHG | HEIGHT: 66 IN | BODY MASS INDEX: 28.88 KG/M2

## 2020-01-23 DIAGNOSIS — C50.412 MALIGNANT NEOPLASM OF UPPER-OUTER QUADRANT OF LEFT BREAST IN FEMALE, ESTROGEN RECEPTOR POSITIVE (HCC): ICD-10-CM

## 2020-01-23 DIAGNOSIS — Z17.0 MALIGNANT NEOPLASM OF UPPER-OUTER QUADRANT OF LEFT BREAST IN FEMALE, ESTROGEN RECEPTOR POSITIVE (HCC): ICD-10-CM

## 2020-01-23 LAB
ALBUMIN SERPL-MCNC: 4.2 G/DL (ref 3.5–5.2)
ALP BLD-CCNC: 137 U/L (ref 35–104)
ALT SERPL-CCNC: 32 U/L (ref 0–32)
ANION GAP SERPL CALCULATED.3IONS-SCNC: 14 MMOL/L (ref 7–16)
AST SERPL-CCNC: 24 U/L (ref 0–31)
BASOPHILS ABSOLUTE: 0.04 E9/L (ref 0–0.2)
BASOPHILS RELATIVE PERCENT: 0.4 % (ref 0–2)
BILIRUB SERPL-MCNC: 0.3 MG/DL (ref 0–1.2)
BUN BLDV-MCNC: 12 MG/DL (ref 8–23)
CALCIUM SERPL-MCNC: 10.2 MG/DL (ref 8.6–10.2)
CHLORIDE BLD-SCNC: 102 MMOL/L (ref 98–107)
CO2: 22 MMOL/L (ref 22–29)
CREAT SERPL-MCNC: 0.6 MG/DL (ref 0.5–1)
EOSINOPHILS ABSOLUTE: 0.18 E9/L (ref 0.05–0.5)
EOSINOPHILS RELATIVE PERCENT: 1.8 % (ref 0–6)
GFR AFRICAN AMERICAN: >60
GFR NON-AFRICAN AMERICAN: >60 ML/MIN/1.73
GLUCOSE BLD-MCNC: 108 MG/DL (ref 74–99)
HCT VFR BLD CALC: 41.8 % (ref 34–48)
HEMOGLOBIN: 13.6 G/DL (ref 11.5–15.5)
IMMATURE GRANULOCYTES #: 0.03 E9/L
IMMATURE GRANULOCYTES %: 0.3 % (ref 0–5)
LYMPHOCYTES ABSOLUTE: 1.23 E9/L (ref 1.5–4)
LYMPHOCYTES RELATIVE PERCENT: 12.6 % (ref 20–42)
MCH RBC QN AUTO: 29.5 PG (ref 26–35)
MCHC RBC AUTO-ENTMCNC: 32.5 % (ref 32–34.5)
MCV RBC AUTO: 90.7 FL (ref 80–99.9)
MONOCYTES ABSOLUTE: 0.56 E9/L (ref 0.1–0.95)
MONOCYTES RELATIVE PERCENT: 5.7 % (ref 2–12)
NEUTROPHILS ABSOLUTE: 7.74 E9/L (ref 1.8–7.3)
NEUTROPHILS RELATIVE PERCENT: 79.2 % (ref 43–80)
PDW BLD-RTO: 12.8 FL (ref 11.5–15)
PLATELET # BLD: 255 E9/L (ref 130–450)
PMV BLD AUTO: 10.5 FL (ref 7–12)
POTASSIUM SERPL-SCNC: 4 MMOL/L (ref 3.5–5)
RBC # BLD: 4.61 E12/L (ref 3.5–5.5)
SODIUM BLD-SCNC: 138 MMOL/L (ref 132–146)
TOTAL PROTEIN: 8 G/DL (ref 6.4–8.3)
WBC # BLD: 9.8 E9/L (ref 4.5–11.5)

## 2020-01-23 PROCEDURE — 99214 OFFICE O/P EST MOD 30 MIN: CPT | Performed by: INTERNAL MEDICINE

## 2020-01-23 PROCEDURE — 77080 DXA BONE DENSITY AXIAL: CPT

## 2020-01-23 PROCEDURE — 99244 OFF/OP CNSLTJ NEW/EST MOD 40: CPT | Performed by: INTERNAL MEDICINE

## 2020-01-23 PROCEDURE — 85025 COMPLETE CBC W/AUTO DIFF WBC: CPT

## 2020-01-23 PROCEDURE — 80053 COMPREHEN METABOLIC PANEL: CPT

## 2020-01-23 PROCEDURE — 36415 COLL VENOUS BLD VENIPUNCTURE: CPT

## 2020-01-23 RX ORDER — ANASTROZOLE 1 MG/1
1 TABLET ORAL DAILY
Qty: 90 TABLET | Refills: 1 | Status: SHIPPED
Start: 2020-01-23 | End: 2020-05-28 | Stop reason: SDUPTHER

## 2020-01-23 NOTE — PROGRESS NOTES
Department of [facility] Oncology  Attending Consult Note    Reason for Visit:  Consultation on a patient with Left Breast Cancer    Referring Physician: Chaz Lewis MD    PCP:  Torin Macias MD    History of Present Illness:  61 y/o female status post Left simple mastectomy/SLNB and right prophylactic mastectomy on 04/09/2018. Left multifocal ER >90%/CO 90%, HER2 negative (0). 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 pT2 pN1a    Right breast [facility]    4/20/18 s/p Left ALND  Left axilla, lymph node dissection - One of three lymph nodes, positive for metastatic carcinoma (1/3). - Size of largest metastatic deposit: 3.5 mm.   - No extracapsular extension identified. - Fibroadipose tissue and skeletal muscle with changes consistent with prior surgery site    The final pathologic stage is mpT2 pN1a. Stage IIB: (T2, N1, M0)  S/p adjuvant chemotherapy Dose dense AC x4, followed by Taxol x 4 completed 08/28/2018. RT was completed on 11/15/2018. Baseline DEXA 10/18 osteopenia. Currently on Arimidex since Nov 2018 with fair tolerance so far except for mild arthralgias. Review of Systems;  CONSTITUTIONAL: No fever, chills. Good appetite and energy level. ENMT: Eyes: No diplopia; Nose: No epistaxis. Mouth: No sore throat. RESPIRATORY: No hemoptysis, shortness of breath, cough. CARDIOVASCULAR: No chest pain, palpitations. GASTROINTESTINAL: No nausea/vomiting, abdominal pain, diarrhea/constipation. GENITOURINARY: No dysuria, urinary frequency, hematuria. NEURO: No syncope, presyncope, headache.   Remainder:  ROS NEGATIVE    Past Medical History:      Diagnosis Date    [facility] 03/2018    Cancer ([facility]     Hypertension     Migraine (T2, N1, M0)  S/p adjuvant chemotherapy Dose dense AC x 4, followed by Taxol x 4 completed 08/28/2018. RT was completed on 11/15/2018. Baseline DEXA 10/18 osteopenia. Currently on Arimidex since Nov 2018 with fair tolerance so far except for mild arthralgias. KARELY. Continue Arimidex, Ca/VitD. DEXA scan today 01/23/2020. RTC 4 months. Thank you for allowing us to participate in the care of Mrs. Kapadia.      Rhina Forrest MD   [phone]  Board Certified Medical Oncologist

## 2020-01-23 NOTE — PROGRESS NOTES
Forrest Kapadia  1955 62 y.o. Referring Physician: Dr. Reynaldo Cabrera    PCP: Popeye Staley MD    Vitals:    20 1050   BP: (!) 191/91   Pulse: 87   Temp: 98.4 °F (36.9 °C)   SpO2: 97%        Wt Readings from Last 3 Encounters:   20 179 lb 11.2 oz (81.5 kg)   10/10/19 179 lb 14.4 oz (81.6 kg)   19 176 lb (79.8 kg)        Body mass index is 29 kg/m². Chief Complaint: Patient referred d/t left breast cancer. Chief Complaint   Patient presents with    New Patient         Cancer Staging  No matching staging information was found for the patient. Prior Radiation Therapy? YES: Site Treated: Left breast          Facility: [facility]          Date:     Concurrent Chemo/radiation? NO    Prior Chemotherapy? YES: Site Treated: Left breast          Facility: [facility]          Date:     Prior Hormonal Therapy? NO    Head and Neck Cancer? No, patient does NOT have HN cancer. LMP: 48 yrs old    Age at first Menses: 12 yrs    : 1    Para: 1          Current Outpatient Medications:     anastrozole (ARIMIDEX) 1 MG tablet, Take 1 tablet by mouth daily, Disp: 90 tablet, Rfl: 1    lisinopril (PRINIVIL;ZESTRIL) 10 MG tablet, Indications: High Blood Pressure Disorder TAKE 1 TABLET DAILY, Disp: 90 tablet, Rfl: 3    Omega-3 Fatty Acids (OMEGA-3 FISH OIL) 500 MG CAPS, Take by mouth daily, Disp: , Rfl:     Probiotic Product (PROBIOTIC-10) CAPS, Take by mouth daily, Disp: , Rfl:     vitamin C (ASCORBIC ACID) 500 MG tablet, Take 500 mg by mouth daily, Disp: , Rfl:     Cholecalciferol (VITAMIN D3) 2000 units CAPS, Take 4,000 Units by mouth daily , Disp: , Rfl:     Calcium Carb-Cholecalciferol (CALCIUM 1000 + D) 1000-800 MG-UNIT TABS, Take 1 tablet by mouth daily (Patient taking differently: Take 1 tablet by mouth daily ), Disp: 90 tablet, Rfl: 1    b complex vitamins capsule, Take 1 capsule by mouth daily. , Disp: , Rfl:     acetaminophen (TYLENOL) 325 MG tablet, Take 650 mg by mouth every 6 hours as needed for Pain., Disp: , Rfl:     naproxen sodium (ALEVE) 220 MG tablet, Take 220 mg by mouth 2 times daily (with meals). , Disp: , Rfl:     Phenylephrine-Acetaminophen (SINUS HEADACHE PE MAX ST) 5-325 MG TABS, Take  by mouth as needed. , Disp: , Rfl:        Past Medical History:   Diagnosis Date    Breast cancer ([facility] 2018    Cancer ([facility]     Hypertension     Migraine headache 3/2/2015    Obesity (BMI 30.0-34.9) 3/20/2017       Past Surgical History:   Procedure Laterality Date    BREAST SURGERY Bilateral     biopsies; non malignant    CHOLECYSTECTOMY      TONSILLECTOMY         Family History   Problem Relation Age of Onset    Cancer Mother         Breast CA    Heart Disease Mother     Diabetes Father     Other Father         Pancreatitis    Cancer Sister         Leukemia    Cancer Paternal Uncle         pancreativc for 1/colon for 2nd    Cancer Sister         Breast CA       Social History     Socioeconomic History    Marital status:      Spouse name: Not on file    Number of children: Not on file    Years of education: Not on file    Highest education level: Not on file   Occupational History    Not on file   Social Needs    Financial resource strain: Not on file    Food insecurity:     Worry: Not on file     Inability: Not on file    Transportation needs:     Medical: Not on file     Non-medical: Not on file   Tobacco Use    Smoking status: Former Smoker     Packs/day: 0.50     Years: 12.00     Pack years: 6.00     Types: Cigarettes     Last attempt to quit: 1986     Years since quittin.4    Smokeless tobacco: Never Used   Substance and Sexual Activity    Alcohol use: No     Alcohol/week: 0.0 standard drinks     Comment: <1 drink/week    Drug use: No    Sexual activity: Yes     Partners: Male     Comment:  since ; postmenopausal   Lifestyle    Physical activity:     Days per week: Not on file     Minutes per as indicated for ambulation activities  2. Reorient confused/cognitively impaired patient  3. Call-light/bell within patient's reach  4. Chair/bed in low position, stretcher/bed with siderails up except when performing patient care activities  5. Educate patient/family/caregiver on falls prevention  6.  Reassess in 12 weeks or with any noted change in patient condition which places them at a risk for a fall   4-6 Moderate Risk 1. Provide assistance as indicated for ambulation activities  2. Reorient confused/cognitively impaired patient  3. Call-light/bell within patient's reach  4. Chair/bed in low position, stretcher/bed with siderails up except when performing patient care activities  5. Educate patient/family/caregiver on falls prevention  6. Falls risk precaution (Yellow sticker Level II) placed on patient chart   7 or   Higher High Risk 1. Place patient in easily observable treatment room  2. Patient attended at all times by family member or staff  3. Provide assistance as indicated for ambulation activities  4. Reorient confused/cognitively impaired patient  5. Call-light/bell within patient's reach  6. Chair/bed in low position, stretcher/bed with siderails up except when performing patient care activities  7. Educate patient/family/caregiver on falls prevention  8. Falls risk precaution (Yellow sticker Level III) placed on patient chart           MALNUTRITION RISK SCREENING ASSESSMENT    Instructions:  Assess the patient and enter the appropriate indicators that are present for nutrition risk identification. Total the numbers entered and assign a risk score. Follow the appropriate action for total score listed below. Assessment   Date  1/23/2020     1. Have you lost weight without trying? 0- No     2. Have you been eating poorly because of a decreased appetite? 0- No   3. Do you have a diagnosis of head and neck cancer?       0- No TOTAL 0        Score of 0-1: No action  Score 2 or greater:  · For Non-Diabetic Patient: Recommend adding Ensure Enlive 2 x daily and provide patient with Ensure wellness bag with coupons  · For Diabetic Patient: Recommend adding Glucerna Shake 2 x daily and provide patient with Glucerna Wellness bag with coupons  · Route to the dietitian via [address] [place] [address] Rd          ·                 [employer]

## 2020-01-31 ENCOUNTER — OFFICE VISIT (OUTPATIENT)
Dept: FAMILY MEDICINE CLINIC | Age: 65
End: 2020-01-31
Payer: COMMERCIAL

## 2020-01-31 PROCEDURE — 99213 OFFICE O/P EST LOW 20 MIN: CPT | Performed by: FAMILY MEDICINE

## 2020-01-31 RX ORDER — CEFDINIR 300 MG/1
300 CAPSULE ORAL 2 TIMES DAILY
Qty: 20 CAPSULE | Refills: 0 | Status: SHIPPED
Start: 2020-01-31 | End: 2020-05-12

## 2020-02-04 ASSESSMENT — ENCOUNTER SYMPTOMS
RESPIRATORY NEGATIVE: 1
GASTROINTESTINAL NEGATIVE: 1

## 2020-02-05 NOTE — PROGRESS NOTES
Es Ferrari is a 62 y.o. female who presents today for     Chief Complaint   Patient presents with    Cough       Onset of symptoms x 2 weeks. She contracted RSV from her grandson. Her current symptoms include cough that is productive of yellow sputum, sore throat, ear pain and congested, crusty. No F/C. Appetite and hydration okay. OTC Coricidin BP, Nyquil with minimal relief    PMFSH:  Patient's past medical, surgical, social and/or family history reviewed, updated in chart, and are non-contributory (unless otherwise stated). Medications and allergies also reviewed and updated in chart. Review of Systems  Review of Systems   Constitutional: Negative for fatigue. HENT: Negative. Respiratory: Negative. Cardiovascular: Negative. Gastrointestinal: Negative. Endocrine: Negative. Musculoskeletal: Negative. Neurological: Negative. Psychiatric/Behavioral: Negative. All other systems reviewed and are negative. Physical Exam:    VS:  There were no vitals taken for this visit. LAST WEIGHT:  Wt Readings from Last 3 Encounters:   01/31/20 180 lb (81.6 kg)   01/23/20 179 lb 11.2 oz (81.5 kg)   10/10/19 179 lb 14.4 oz (81.6 kg)       BMI Readings from Last 3 Encounters:   01/31/20 29.05 kg/m²   01/23/20 29.00 kg/m²   10/10/19 29.04 kg/m²       Physical Exam  Constitutional:       General: She is not in acute distress. Appearance: She is well-developed. She is not diaphoretic. HENT:      Head: Normocephalic and atraumatic. Right Ear: External ear normal. Tympanic membrane is retracted. Left Ear: External ear normal. Tympanic membrane is not retracted. Nose:      Right Sinus: No maxillary sinus tenderness or frontal sinus tenderness. Left Sinus: No maxillary sinus tenderness or frontal sinus tenderness. Mouth/Throat:      Pharynx: Pharyngeal swelling and posterior oropharyngeal erythema present. No oropharyngeal exudate.    Eyes:      General: No plan.    All questions answered.     Nathalie Kurtz MD

## 2020-05-07 ASSESSMENT — ENCOUNTER SYMPTOMS
BACK PAIN: 0
BLOOD IN STOOL: 0
CONSTIPATION: 0
VOMITING: 0
DIARRHEA: 0
NAUSEA: 0
COUGH: 0
SHORTNESS OF BREATH: 0
SORE THROAT: 0
WHEEZING: 0
ABDOMINAL PAIN: 0

## 2020-05-08 NOTE — PROGRESS NOTES
(36.7 °C) (Oral)   Resp 18   Ht 5' 6\" (1.676 m)   Wt 186 lb (84.4 kg)   SpO2 98%   BMI 30.02 kg/m²     LAST WEIGHT:  Wt Readings from Last 3 Encounters:   05/12/20 186 lb (84.4 kg)   01/31/20 180 lb (81.6 kg)   01/23/20 179 lb 11.2 oz (81.5 kg)       BMI Readings from Last 3 Encounters:   05/12/20 30.02 kg/m²   01/31/20 29.05 kg/m²   01/23/20 29.00 kg/m²       Physical Exam  Constitutional:       General: She is not in acute distress. Appearance: She is well-developed. She is not diaphoretic. HENT:      Head: Normocephalic and atraumatic. Neck:      Thyroid: No thyromegaly. Musculoskeletal: Normal range of motion. Right knee: She exhibits swelling, effusion and bony tenderness. She exhibits normal range of motion. Tenderness found. Medial joint line and lateral joint line tenderness noted. Left knee: She exhibits swelling and bony tenderness. Tenderness found. Medial joint line and lateral joint line tenderness noted. Skin:     General: Skin is warm and dry. Coloration: Skin is not pale. Findings: No erythema or rash. Neurological:      Mental Status: She is alert and oriented to person, place, and time. Psychiatric:         Mood and Affect: Mood normal.         Behavior: Behavior normal.         Thought Content:  Thought content normal.         Judgment: Judgment normal.         Labs:  Lab Results   Component Value Date     01/23/2020    K 4.0 01/23/2020     01/23/2020    CO2 22 01/23/2020    BUN 12 01/23/2020    CREATININE 0.6 01/23/2020    PROT 8.0 01/23/2020    LABALBU 4.2 01/23/2020    CALCIUM 10.2 01/23/2020    GFRAA >60 01/23/2020    LABGLOM >60 01/23/2020    GLUCOSE 108 01/23/2020    AST 24 01/23/2020    ALT 32 01/23/2020    ALKPHOS 137 01/23/2020    BILITOT 0.3 01/23/2020    TSH 0.778 07/18/2019    CHOL 205 07/18/2019    TRIG 119 07/18/2019    HDL 61 07/18/2019    LDLCALC 120 07/18/2019        Lab Results   Component Value Date    CHOL 205 (H) 07/18/2019 CHOL 190 03/13/2017    CHOL 210 (H) 02/25/2016     Lab Results   Component Value Date    TRIG 119 07/18/2019    TRIG 76 03/13/2017    TRIG 95 02/25/2016     Lab Results   Component Value Date    HDL 61 07/18/2019    HDL 79 03/13/2017    HDL 81 02/25/2016     Lab Results   Component Value Date    LDLCALC 120 (H) 07/18/2019    LDLCALC 96 03/13/2017    LDLCALC 110 (H) 02/25/2016       No results found for: LABA1C  Lab Results   Component Value Date    LDLCALC 120 (H) 07/18/2019    CREATININE 0.6 01/23/2020           Assessment / Plan:      Lizzie Mcgee was seen today for knee pain. Diagnoses and all orders for this visit:    Chronic pain of left knee: Differential diagnosis may include primary arthritis or complication of Arimidex. Inquired about using high dose Vitamin D as adjuvant of aromatase inhibitor use to mitigate MS pain  -     XR KNEE LEFT (3 VIEWS); Future  -     Will check patient's Vitamin D level today. If Vitamin D is <60, will consider trial of high(er) dose Vitamin D    Chronic pain of right knee: Differential diagnosis may include primary arthritis or complication of Arimidex. Inquired about using high dose Vitamin D as adjuvant of aromatase inhibitor use to mitigate MS pain  -     XR KNEE LEFT (3 VIEWS); Future  -     Will check patient's Vitamin D level today. If Vitamin D is <60, will consider trial of high(er) dose Vitamin D             Follow Up:  Return 1) F/U based on test outcome, for 2) Keep scheduled appointment(s) (7/2020). or sooner if necessary. Call or go to ED immediately if symptoms worsen or persist.    Educational materials and/or home exercises printed for patient's review and were included in patient instructions on his/her AfterVisit Summary and given to patient at the end of visit. Counseled regarding above diagnosis,including possible risks and complications,  especially if left uncontrolled.     Counseled regarding the possible side effects, risks, benefits and

## 2020-05-12 ENCOUNTER — HOSPITAL ENCOUNTER (OUTPATIENT)
Age: 65
Discharge: HOME | End: 2020-05-14
Payer: COMMERCIAL

## 2020-05-12 ENCOUNTER — OFFICE VISIT (OUTPATIENT)
Dept: FAMILY MEDICINE CLINIC | Age: 65
End: 2020-05-12
Payer: COMMERCIAL

## 2020-05-12 VITALS
BODY MASS INDEX: 29.89 KG/M2 | SYSTOLIC BLOOD PRESSURE: 138 MMHG | DIASTOLIC BLOOD PRESSURE: 76 MMHG | HEIGHT: 66 IN | HEART RATE: 84 BPM | WEIGHT: 186 LBS | RESPIRATION RATE: 18 BRPM | OXYGEN SATURATION: 98 % | TEMPERATURE: 98.1 F

## 2020-05-12 LAB — VITAMIN D 25-HYDROXY: 80 NG/ML (ref 30–100)

## 2020-05-12 PROCEDURE — 82306 VITAMIN D 25 HYDROXY: CPT

## 2020-05-12 PROCEDURE — 99213 OFFICE O/P EST LOW 20 MIN: CPT | Performed by: FAMILY MEDICINE

## 2020-05-12 ASSESSMENT — PATIENT HEALTH QUESTIONNAIRE - PHQ9
SUM OF ALL RESPONSES TO PHQ9 QUESTIONS 1 & 2: 0
2. FEELING DOWN, DEPRESSED OR HOPELESS: 0
1. LITTLE INTEREST OR PLEASURE IN DOING THINGS: 0
SUM OF ALL RESPONSES TO PHQ QUESTIONS 1-9: 0
SUM OF ALL RESPONSES TO PHQ QUESTIONS 1-9: 0

## 2020-05-18 ENCOUNTER — TELEPHONE (OUTPATIENT)
Dept: FAMILY MEDICINE CLINIC | Age: 65
End: 2020-05-18

## 2020-05-18 NOTE — TELEPHONE ENCOUNTER
Please notify patient that she has moderate/moderately advanced arthritis of both knees. Is she interested in trying an antiinflammatory medication other than Aleve? She can continue with Tylenol PRN. Confirm that she received the message about her high Vitamin D level and that additional high dose Vitamin D is not  Salvatore.

## 2020-05-20 NOTE — TELEPHONE ENCOUNTER
Pt informed gave verbal understanding. Would like to try an antiinflammatory for arthritis wants script sent to [facility] on [address] pharmacy in chart. Please advise

## 2020-05-28 ENCOUNTER — VIRTUAL VISIT (OUTPATIENT)
Dept: ONCOLOGY | Age: 65
End: 2020-05-28
Payer: COMMERCIAL

## 2020-05-28 PROCEDURE — 99214 OFFICE O/P EST MOD 30 MIN: CPT | Performed by: INTERNAL MEDICINE

## 2020-05-28 RX ORDER — ANASTROZOLE 1 MG/1
1 TABLET ORAL DAILY
Qty: 90 TABLET | Refills: 1 | Status: SHIPPED
Start: 2020-05-28 | End: 2020-09-24 | Stop reason: SDUPTHER

## 2020-05-28 NOTE — PROGRESS NOTES
Department of [facility] Oncology  Attending Clinic Note    Reason for Visit:  Follow-up on a patient with Left Breast Cancer    PCP:  Amanda Chang MD    History of Present Illness:  62 y.o. female status post Left simple mastectomy/SLNB and right prophylactic mastectomy on 04/09/2018. Left multifocal ER >90%/NV 90%, HER2 negative (0). 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 pT2 pN1a    Right breast [facility]    4/20/18 s/p Left ALND  Left axilla, lymph node dissection - One of three lymph nodes, positive for metastatic carcinoma (1/3). - Size of largest metastatic deposit: 3.5 mm.   - No extracapsular extension identified. - Fibroadipose tissue and skeletal muscle with changes consistent with prior surgery site    The final pathologic stage is mpT2 pN1a. Stage IIB: (T2, N1, M0)  S/p adjuvant chemotherapy Dose dense AC x4, followed by Taxol x 4 completed 08/28/2018. RT was completed on 11/15/2018. Baseline DEXA 10/18 osteopenia. Currently on Arimidex since Nov 2018. Telehealth virtual video visit 05/28/2020; provider in clinic. Patient at home. Denies hot flashes, night sweats, mood swings/mood changes. Denies fever, chills, chest pain, SOB, or nausea. Arthralgias relieved by diclofenac. Review of Systems;  CONSTITUTIONAL: No fever, chills. Good appetite and energy level. ENMT: Eyes: No diplopia; Nose: No epistaxis. Mouth: No sore throat. RESPIRATORY: No hemoptysis, shortness of breath, cough. CARDIOVASCULAR: No chest pain, palpitations. GASTROINTESTINAL: No nausea/vomiting, abdominal pain. MSK: Arthralgias relieved by diclofenac  GENITOURINARY: No dysuria, urinary frequency, hematuria.   NEURO: No syncope, presyncope,

## 2020-07-09 ENCOUNTER — HOSPITAL ENCOUNTER (OUTPATIENT)
Dept: RADIATION ONCOLOGY | Age: 65
Discharge: HOME | End: 2020-07-09
Payer: COMMERCIAL

## 2020-07-09 VITALS — BODY MASS INDEX: 27.92 KG/M2 | WEIGHT: 173 LBS

## 2020-07-09 PROCEDURE — 99442 PR PHYS/QHP TELEPHONE EVALUATION 11-20 MIN: CPT | Performed by: RADIOLOGY

## 2020-07-09 NOTE — PROGRESS NOTES
Radiation Oncology   Follow Up Note  Kadie Vegas. Didi Juarez MD MS DABR      7/9/2020  Zaina Pappas  Date of Service: 7/9/20         Diagnosis: pT2 (cT3?) pN1a cMo left breast IDC s/p MRM and AC+T              -ER+              -IA+              -Her2-            HPI:           Mrs. Claudia Crandall is a pleasant 59 year old woman with a history of left breast cancer who presents to discuss fractionated external beam radiation therapy as a component of definitve management in the post mastectomy setting. SCHMID, course and PATH reviewed.  KPS 80-90. On 11/15/18, Lashawn Kapadia completed 6000 cGy in 30 fractions directed to the left chest wall for management of left breast cancer. States that's he is doing well. Notes no skin issues. Denies any new lumps or bumps. Pt is following with Dr. Liz Mcbride. [facility] for Medical Oncology here with Dr. Bruno Barker on Arimidex. Tolerating well and compliant but has weight gain and join issues. Next follow up in MAY. She notes mild in field skin changes that do not affect her QOL. She does note periodic dry cough that is improving (periodic not daily). She does note report LE or other LUE issues. KPS 80-90. [facility] CE reviewed.                 -----        Per [facility]:        59year old female with invasive mammary carcinoma of the Left breast, multicentric, pathologic stage T2N1a, ER-positive, IA-positive and Her2/ying not amplified, s/p partial mastectomy with axillary lymph node dissection (2 out of 4 nodes positive) s/p ddAC --> T    HPI: 59year old female who presents with above diagnosis, for an opinion regarding the role of radiation therapy in the management of the patient's disease. Final recommendations will be communicated back to the requesting physician by way of the shared medical record, or letter to requesting physician via [address] mail.     The patient reports on screening mammogram she was found to have an abnormality located in the Left breast. Mammogram/ultrasound demonstrated: The 1.3 cm x 1.2 cm x 1.5 cm irregular mass in the left breast at 2   o'clock posterior depth is at a moderate suspicion for malignancy.  An   ultrasound guided biopsy is recommended. The 0.7 cm x 0.7 cm x 0.6 cm irregular mass in the left breast at 2   o'clock middle depth is at a moderate suspicion for malignancy.           -----        Pathology reviewed:     [facility] MRM PATH:       1. Left sentinel lymph node, sentinel node biopsy (A) - Metastatic carcinoma   involving one lymph node (1/1), metastasis measures 6 mm in greatest dimension   with 1 mm of extranodal extension     2. Left breast, mastectomy (B) - Multicentric invasive mammary carcinoma with   mixed ductal and lobular features, Mcbrides grade 2, measuring 2.3 cm in   greatest dimension (please see comment and synoptic report)  - Ductal carcinoma in situ, low and intermediate nuclear grades, solid and   cribriform types with focal comedonecrosis  - Lobular neoplasia (atypical lobular hyperplasia/lobular carcinoma in situ)  - Lymphovascular space invasion is identified  - Marked proliferative epithelial changes including radial sclerosing lesions,   intraductal papillomas, sclerosing adenosis, and florid usual ductal hyperplasia   with microcalcifications  - Biopsy clips x2  - Stromal scar consistent with history of remote surgical procedure  - Skin with seborrheic keratoses    3.  Right breast, prophylactic mastectomy (C) - Atypical lobular hyperplasia  - Marked proliferative epithelial changes including sclerosing adenosis, florid   usual ductal hyperplasia, and radial scars  - Skin with seborrheic keratosis         SYNOPTIC REPORT OF KEY PATHOLOGIC FINDINGS    LEFT BREAST:       BREAST INVASIVE CARCINOMA WORKSHEET        Part: A and B  Procedure:         Total mastectomy (including nipple-sparing and skin-sparing  mastectomy)  Specimen Laterality:         Left  Tumor size: Size of largest invasive carcinoma:       Greatest dimension of largest focus of invasion >1 mm: 23 mm  Tumor Focality:         Multiple foci of invasive carcinoma  Histologic Type of Invasive Carcinoma:         Invasive carcinoma with ductal and lobular features (mixed type  carcinoma)  Histologic Grade:         Glandular (Acinar) / Tubular Differentiation:        Score 3        Nuclear Pleomorphism:        Score 2        Mitotic Rate:        Score 1 (<=3 mitosis per mm2)        Overall Grade:        Grade II  Ductal Carcinoma In Situ:         DCIS is present in specimen        Negative for extensive intraductal component (EIC)  Architectural Patterns:         Architectural Pattern:Cribriform        Architectural Pattern:Solid  DCIS Nuclear Grade:         Grade II (intermediate)  DCIS Necrosis:         Present, central (expansive \"comedo\" necrosis)  Tumor Extension:   Skin:         Uninvolved  Nipple:         Uninvolved  Skeletal muscle:         Not applicable  Invasive Carcinoma Margins:         Margins uninvolved by invasive carcinoma        Distance from closest margin: 1 mm        Closest margin: Deep  DCIS Margins:         Margins uninvolved by DCIS        Distance from closest margin: 10 mm        Closest margin: Deep  Lymph Nodes:         Number of lymph nodes with macrometastases (>2 mm): 1        Number of lymph nodes with micrometastases (>0.2 mm to 2 mm and/or  >200 cells): 0        Number of lymph nodes with isolated tumor cells (<= 0.2 mm and <= 200  cells): 0        Size of largest metastatic deposit: 6 mm        Extranodal extension present: 1 mm        Number of lymph nodes examined: 1        Number of sentinel lymph nodes examined (required only if sentinel  nodes are present): 1  Treatment Effect:         No known presurgical therapy  Lymph-Vascular Invasion:         Present  Pathologic Stage Classification (pTNM,AJCC 8th ed)  TNM Descriptor(s):         m (multiple foci of invasive carcinoma)  Primary Tumor (Invasive Carcinoma) (pT):         pT2  Regional Lymph Nodes (pN):         Modifier:         (sn): [place] node(s) evaluated  Category (pN):         pN1a  Distant metastasis:         Distant Metastasis (pM) Not applicable/Not confirmed pathologically  in this case          -----          Kenneth Whatley is a 60 y.o. female evaluated via telephone on 7/9/2020. Consent:  She and/or health care decision maker is aware that she may receive a bill for this telephone service, depending on her insurance coverage, and has provided verbal consent to proceed: Yes      Documentation:  I communicated with the patient and/or health care decision maker about breast ca. Details of this discussion including any medical advice provided: NCCN based FU      I affirm this is a Patient Initiated Episode with an Established Patient who has not had a related appointment within my department in the past 7 days or scheduled within the next 24 hours. I also affirm that the pt is at home and that I, during this visit, am in my office at [facility].       Patient identification was verified at the start of the visit: Yes      Total Time: minutes: 11-20 minutes      Kalyan Shelton III       -----      Past Medical History:   Diagnosis Date    [facility] 03/2018    Cancer ([facility]     Hypertension     Migraine headache 3/2/2015    Obesity (BMI 30.0-34.9) 3/20/2017         Past Surgical History:   Procedure Laterality Date    BREAST SURGERY Bilateral     biopsies; non malignant    CHOLECYSTECTOMY      TONSILLECTOMY           Social History     Socioeconomic History    Marital status:      Spouse name: Not on file    Number of children: Not on file    Years of education: Not on file    Highest education level: Not on file   Occupational History    Not on file   Social Needs    Financial resource strain: Not on file    Food insecurity     Worry: Not on file     Inability: Not on file    Transportation needs     Medical: Not on file tablet 3    Omega-3 Fatty Acids (OMEGA-3 FISH OIL) 500 MG CAPS Take by mouth daily      Probiotic Product (PROBIOTIC-10) CAPS Take by mouth daily      vitamin C (ASCORBIC ACID) 500 MG tablet Take 500 mg by mouth daily      Cholecalciferol (VITAMIN D3) 2000 units CAPS Take 4,000 Units by mouth daily       Calcium Carb-Cholecalciferol (CALCIUM 1000 + D) 1000-800 MG-UNIT TABS Take 1 tablet by mouth daily (Patient taking differently: Take 1 tablet by mouth daily ) 90 tablet 1    b complex vitamins capsule Take 1 capsule by mouth daily.  acetaminophen (TYLENOL) 325 MG tablet Take 650 mg by mouth every 6 hours as needed for Pain.  naproxen sodium (ALEVE) 220 MG tablet Take 220 mg by mouth 2 times daily (with meals).  Phenylephrine-Acetaminophen (SINUS HEADACHE PE MAX ST) 5-325 MG TABS Take  by mouth as needed. No current facility-administered medications for this encounter. REVIEW OF SYSTEMS  Negative except joint pain. -PE   -NA        A/P:        -indication / CC: Breast cancer  -Pt status:  KARELY   -SE:  -  -FU plans:  6 mo FU  with me   -orders to sched: -        *I spent at least 15 MIN on this case  (as above) and with this patient today including personally performing/reviewing the chart review, history, ROS, and providing a summary and description of the detailed medical decision making as a basis for any and all recommendations made today (+/- a pertinent RBA discussion): literature and radiology reviews were performed as noted and applicable (37% or more time was spent in direct patient ) - as well as appropriate FU orders. Carlo Napoles. Collins Conway MD [place] [address] Oncology  Cell: [phone]  [facility]:  [phone]   FAX: [phone] [address]:   [address] [place] [address] Avenue:    [phone]  [address] [place] [address] Road:  [phone]   FAX:  [phone]  Email: [email or web address]      NOTE: This report was transcribed using voice recognition software.  Every effort was made to ensure accuracy; however, inadvertent computerized transcription errors may be present.

## 2020-08-06 ENCOUNTER — HOSPITAL ENCOUNTER (OUTPATIENT)
Age: 65
Discharge: HOME | End: 2020-08-08
Payer: COMMERCIAL

## 2020-08-06 ENCOUNTER — NURSE ONLY (OUTPATIENT)
Dept: FAMILY MEDICINE CLINIC | Age: 65
End: 2020-08-06

## 2020-08-06 LAB
ALBUMIN SERPL-MCNC: 4.4 G/DL (ref 3.5–5.2)
ALP BLD-CCNC: 101 U/L (ref 35–104)
ALT SERPL-CCNC: 27 U/L (ref 0–32)
ANION GAP SERPL CALCULATED.3IONS-SCNC: 14 MMOL/L (ref 7–16)
AST SERPL-CCNC: 22 U/L (ref 0–31)
BASOPHILS ABSOLUTE: 0.03 E9/L (ref 0–0.2)
BASOPHILS RELATIVE PERCENT: 0.7 % (ref 0–2)
BILIRUB SERPL-MCNC: 0.4 MG/DL (ref 0–1.2)
BUN BLDV-MCNC: 15 MG/DL (ref 8–23)
CALCIUM SERPL-MCNC: 10.3 MG/DL (ref 8.6–10.2)
CHLORIDE BLD-SCNC: 107 MMOL/L (ref 98–107)
CO2: 24 MMOL/L (ref 22–29)
CREAT SERPL-MCNC: 0.6 MG/DL (ref 0.5–1)
EOSINOPHILS ABSOLUTE: 0.13 E9/L (ref 0.05–0.5)
EOSINOPHILS RELATIVE PERCENT: 2.9 % (ref 0–6)
GFR AFRICAN AMERICAN: >60
GFR NON-AFRICAN AMERICAN: >60 ML/MIN/1.73
GLUCOSE BLD-MCNC: 86 MG/DL (ref 74–99)
HCT VFR BLD CALC: 42.7 % (ref 34–48)
HEMOGLOBIN: 13.6 G/DL (ref 11.5–15.5)
IMMATURE GRANULOCYTES #: 0.01 E9/L
IMMATURE GRANULOCYTES %: 0.2 % (ref 0–5)
LYMPHOCYTES ABSOLUTE: 1.05 E9/L (ref 1.5–4)
LYMPHOCYTES RELATIVE PERCENT: 23.4 % (ref 20–42)
MCH RBC QN AUTO: 30.5 PG (ref 26–35)
MCHC RBC AUTO-ENTMCNC: 31.9 % (ref 32–34.5)
MCV RBC AUTO: 95.7 FL (ref 80–99.9)
MONOCYTES ABSOLUTE: 0.36 E9/L (ref 0.1–0.95)
MONOCYTES RELATIVE PERCENT: 8 % (ref 2–12)
NEUTROPHILS ABSOLUTE: 2.9 E9/L (ref 1.8–7.3)
NEUTROPHILS RELATIVE PERCENT: 64.8 % (ref 43–80)
PDW BLD-RTO: 13.1 FL (ref 11.5–15)
PLATELET # BLD: 224 E9/L (ref 130–450)
PMV BLD AUTO: 11.5 FL (ref 7–12)
POTASSIUM SERPL-SCNC: 4.8 MMOL/L (ref 3.5–5)
RBC # BLD: 4.46 E12/L (ref 3.5–5.5)
SODIUM BLD-SCNC: 145 MMOL/L (ref 132–146)
TOTAL PROTEIN: 7.5 G/DL (ref 6.4–8.3)
WBC # BLD: 4.5 E9/L (ref 4.5–11.5)

## 2020-08-06 PROCEDURE — 85025 COMPLETE CBC W/AUTO DIFF WBC: CPT

## 2020-08-06 PROCEDURE — 80053 COMPREHEN METABOLIC PANEL: CPT

## 2020-08-17 ENCOUNTER — NURSE ONLY (OUTPATIENT)
Dept: FAMILY MEDICINE CLINIC | Age: 65
End: 2020-08-17

## 2020-08-17 ENCOUNTER — HOSPITAL ENCOUNTER (OUTPATIENT)
Age: 65
Discharge: HOME | End: 2020-08-19
Payer: COMMERCIAL

## 2020-08-17 LAB
CHOLESTEROL, TOTAL: 193 MG/DL (ref 0–199)
FOLATE: 18.7 NG/ML (ref 4.8–24.2)
HDLC SERPL-MCNC: 58 MG/DL
LDL CHOLESTEROL CALCULATED: 111 MG/DL (ref 0–99)
TRIGL SERPL-MCNC: 119 MG/DL (ref 0–149)
TSH SERPL DL<=0.05 MIU/L-ACNC: 1.64 UIU/ML (ref 0.27–4.2)
VITAMIN B-12: 671 PG/ML (ref 211–946)
VLDLC SERPL CALC-MCNC: 24 MG/DL

## 2020-08-17 PROCEDURE — 82607 VITAMIN B-12: CPT

## 2020-08-17 PROCEDURE — 82746 ASSAY OF FOLIC ACID SERUM: CPT

## 2020-08-17 PROCEDURE — 84443 ASSAY THYROID STIM HORMONE: CPT

## 2020-08-17 PROCEDURE — 80061 LIPID PANEL: CPT

## 2020-08-19 ENCOUNTER — TELEPHONE (OUTPATIENT)
Dept: SURGERY | Age: 65
End: 2020-08-19

## 2020-08-19 ENCOUNTER — OFFICE VISIT (OUTPATIENT)
Dept: FAMILY MEDICINE CLINIC | Age: 65
End: 2020-08-19
Payer: COMMERCIAL

## 2020-08-19 VITALS
OXYGEN SATURATION: 98 % | DIASTOLIC BLOOD PRESSURE: 84 MMHG | RESPIRATION RATE: 18 BRPM | HEART RATE: 67 BPM | BODY MASS INDEX: 27.97 KG/M2 | SYSTOLIC BLOOD PRESSURE: 132 MMHG | WEIGHT: 174 LBS | HEIGHT: 66 IN | TEMPERATURE: 98.2 F

## 2020-08-19 PROCEDURE — 99396 PREV VISIT EST AGE 40-64: CPT | Performed by: FAMILY MEDICINE

## 2020-08-19 RX ORDER — LISINOPRIL 10 MG/1
TABLET ORAL
Qty: 90 TABLET | Refills: 3 | Status: SHIPPED
Start: 2020-08-19 | End: 2021-07-13 | Stop reason: SDUPTHER

## 2020-08-19 ASSESSMENT — ENCOUNTER SYMPTOMS
BLURRED VISION: 0
VOMITING: 0
CONSTIPATION: 0
COUGH: 0
BLOOD IN STOOL: 0
BACK PAIN: 0
SPUTUM PRODUCTION: 0
SORE THROAT: 0
NAUSEA: 0
DIARRHEA: 0
SHORTNESS OF BREATH: 0
ABDOMINAL PAIN: 0
DOUBLE VISION: 0
WHEEZING: 0
HEARTBURN: 0
ORTHOPNEA: 0

## 2020-08-19 NOTE — TELEPHONE ENCOUNTER
Patient was referred by Dr. Irma Burns for cyst consult. Patient was scheduled on 10/8/20 in [place] office @ 12:45 pm with Dr. Anabella Prakash. Patient was instructed to bring a photo ID, insurance card (if applicable), and list of any current medications. Patient verbalized understanding of appointment instructions.           Electronically signed by Jyothi Walters on 8/19/20 at 1:52 PM EDT

## 2020-08-19 NOTE — PROGRESS NOTES
Macho Carvajal is a 60 y.o. female who presents today    Chief Complaint   Patient presents with    Annual Exam     Breast Cancer:  Treatment has included bilateral mastectomy with left sided lymph node dissection (she did have 2 positive nodes). Completed chemotherapy; now on Arimidex. Complains of chemotherapy induced peripheral neuropathy. Lifestyle:  Relationship: ; Lives spouse and   Employment:  Retired  Diet: in general, a \"healthy\" diet. Less portion control and less mindfulness of carbohydrates/sugars   Exercise: sporadic at this time. She is eager to get into a regular exercise routine  Hydration: is drinking moderate amounts of fluids  Caffeine: 2 small cups of caffeinated coffee per day(s)  Sleep: no sleep issues; 7.5 hours per night        Hypertension:  Patient is here for follow up chronic hypertension. This is  generally controlled on current medication regimen. BP today is 132/84. Takes meds as directed and tolerates them well. Most recent labs reviewed with patient and are not remarkable. No symptoms from htn standpoint per ROS. Patient with faircompliance with lifestyle modifications; she continues to drop weight. Patient does not smoke. Comorbid conditions include N/A . Osteoarthritis, Bilateral Knees:  Seen 5/2020 and diagnosed with OA. Treatment included trial of diclofenac 50 mg BID and weight loss. She lost 12#; between the weight loss and the medications, the knees feel much better. She is interested in continuing to use diclofenac BID      PMFSH:  Patient's past medical, surgical, social and/or family history reviewed, updated in chart, and are non-contributory (unless otherwise stated). Medications and allergies also reviewed and updated in chart. Review of Systems  Review of Systems   Constitutional: Negative for malaise/fatigue and weight loss. HENT: Negative for congestion, ear pain and sore throat.     Eyes: Negative for blurred vision and double vision. Due for vision exam; wears glasses   Respiratory: Negative for cough, sputum production, shortness of breath and wheezing. Cardiovascular: Negative for chest pain, palpitations, orthopnea and leg swelling. Gastrointestinal: Negative for abdominal pain, blood in stool, constipation, diarrhea, heartburn, nausea and vomiting. Genitourinary: Negative for dysuria, frequency, hematuria and urgency. \"cyst\" on perineum that is getting bigger and starting to bother him   Musculoskeletal: Negative for back pain, joint pain, myalgias and neck pain. Skin: Negative for itching and rash. Neurological: Negative for dizziness, tingling, focal weakness, weakness and headaches. Chemotherapy induced peripheral neuropathy. Taking B12 without result thus far   Endo/Heme/Allergies: Allergy/sinus symptoms at least 3 mornings/week. Takes either OTC sinus medication or sumatriptan when headaches ensue   Psychiatric/Behavioral: Negative for depression, memory loss and substance abuse. The patient is not nervous/anxious and does not have insomnia. Physical Exam:    VS:    /84   Pulse 67   Temp 98.2 °F (36.8 °C) (Temporal)   Resp 18   Ht 5' 6\" (1.676 m)   Wt 174 lb (78.9 kg)   SpO2 98%   BMI 28.08 kg/m²   LAST WEIGHT:  Wt Readings from Last 3 Encounters:   08/19/20 174 lb (78.9 kg)   07/09/20 173 lb (78.5 kg)   05/12/20 186 lb (84.4 kg)     Physical Exam   Constitutional: She is oriented to person, place, and time. She appears well-developed and well-nourished. No distress. HENT:   Head: Normocephalic and atraumatic. Right Ear: External ear normal.   Left Ear: External ear normal.   Mouth/Throat: Oropharynx is clear and moist. No oropharyngeal exudate. Eyes: Pupils are equal, round, and reactive to light. Conjunctivae and EOM are normal. Right eye exhibits no discharge. No scleral icterus. Neck: Normal range of motion. Neck supple.  No thyromegaly present. Cardiovascular: Normal rate, regular rhythm, normal heart sounds and intact distal pulses. No murmur heard. Pulmonary/Chest: Effort normal. No stridor. No respiratory distress. She has no wheezes. She has no rales. She exhibits no tenderness. Abdominal: Soft. Bowel sounds are normal. She exhibits no distension and no mass. There is no abdominal tenderness. There is no guarding. Musculoskeletal: Normal range of motion. General: No tenderness or edema. Lymphadenopathy:     She has no cervical adenopathy. Neurological: She is alert and oriented to person, place, and time. Skin: Skin is warm and dry. No rash noted. She is not diaphoretic. No erythema. No pallor. Psychiatric: She has a normal mood and affect.  Her behavior is normal. Thought content normal.       Labs:  Lab Results   Component Value Date     08/06/2020    K 4.8 08/06/2020     08/06/2020    CO2 24 08/06/2020    BUN 15 08/06/2020    CREATININE 0.6 08/06/2020    PROT 7.5 08/06/2020    LABALBU 4.4 08/06/2020    CALCIUM 10.3 08/06/2020    GFRAA >60 08/06/2020    LABGLOM >60 08/06/2020    GLUCOSE 86 08/06/2020    AST 22 08/06/2020    ALT 27 08/06/2020    ALKPHOS 101 08/06/2020    BILITOT 0.4 08/06/2020    TSH 1.640 08/17/2020    CHOL 193 08/17/2020    TRIG 119 08/17/2020    HDL 58 08/17/2020    LDLCALC 111 08/17/2020        Lab Results   Component Value Date    CHOL 193 08/17/2020    CHOL 205 (H) 07/18/2019    CHOL 190 03/13/2017     Lab Results   Component Value Date    TRIG 119 08/17/2020    TRIG 119 07/18/2019    TRIG 76 03/13/2017     Lab Results   Component Value Date    HDL 58 08/17/2020    HDL 61 07/18/2019    HDL 79 03/13/2017     Lab Results   Component Value Date    LDLCALC 111 (H) 08/17/2020    LDLCALC 120 (H) 07/18/2019    LDLCALC 96 03/13/2017       No results found for: LABA1C  Lab Results   Component Value Date    LDLCALC 111 (H) 08/17/2020    CREATININE 0.6 08/06/2020         Assessment / Plan: Tera Cloud was seen today for annual exam.    Diagnoses and all orders for this visit:    Annual physical exam    Essential hypertension  -     lisinopril (PRINIVIL;ZESTRIL) 10 MG tablet; Indications: High Blood Pressure Disorder TAKE 1 TABLET DAILY    Bilateral primary osteoarthritis of knee  -     diclofenac (VOLTAREN) 50 MG EC tablet; Take 1 tablet by mouth 2 times daily    Sebaceous cyst of perineum: Patient is interested in having it removed  -     diclofenac (VOLTAREN) 50 MG EC tablet; Take 1 tablet by mouth 2 times daily  -     lisinopril (PRINIVIL;ZESTRIL) 10 MG tablet; Indications: High Blood Pressure Disorder TAKE 1 TABLET DAILY  -     Sae Sinclair MD, General Surgery, [place]        Call or go to ED immediately if symptoms worsen or persist.    Follow Up:  Return for Schedule Medicare AWV (welcome to Medicare) after 6/1/2021., or sooner if necessary. Educational materials and/or home exercises printed for patient's review and were included in patient instructions on his/her After Visit Summary and given to patient at the end of visit. Counseled regarding above diagnosis, including possible risks and complications,  especially if left uncontrolled. Counseled regarding the possible side effects, risks, benefits and alternatives to treatment; patient and/or guardian verbalizes understanding, agrees, feels comfortable with and wishes to proceed with above treatment plan. Advised patient to call with any new medication issues, and read all Rx info from pharmacy to assure aware of all possible risks and side effects of medication before taking. Reviewed age and gender appropriate health screening exams and vaccinations.   Advised patient regarding importance of keeping up with recommended health maintenance and to schedule as soon as possible if overdue, as this is important in assessing for undiagnosed pathology, especially cancer, as well as protecting against potentially harmful/life threatening disease. Patient and/or guardian verbalizes understanding and agrees with above counseling, assessment and plan. All questions answered.     Christine Velázquez MD

## 2020-09-24 ENCOUNTER — OFFICE VISIT (OUTPATIENT)
Dept: ONCOLOGY | Age: 65
End: 2020-09-24
Payer: COMMERCIAL

## 2020-09-24 ENCOUNTER — HOSPITAL ENCOUNTER (OUTPATIENT)
Dept: GENERAL RADIOLOGY | Age: 65
Discharge: HOME | End: 2020-09-26
Payer: COMMERCIAL

## 2020-09-24 ENCOUNTER — HOSPITAL ENCOUNTER (OUTPATIENT)
Age: 65
Discharge: HOME | End: 2020-09-26
Payer: COMMERCIAL

## 2020-09-24 ENCOUNTER — HOSPITAL ENCOUNTER (OUTPATIENT)
Dept: INFUSION THERAPY | Age: 65
Discharge: HOME | End: 2020-09-24
Payer: COMMERCIAL

## 2020-09-24 VITALS
SYSTOLIC BLOOD PRESSURE: 148 MMHG | BODY MASS INDEX: 27.16 KG/M2 | TEMPERATURE: 98.2 F | OXYGEN SATURATION: 97 % | HEIGHT: 66 IN | HEART RATE: 70 BPM | WEIGHT: 169 LBS | DIASTOLIC BLOOD PRESSURE: 90 MMHG

## 2020-09-24 PROCEDURE — 99214 OFFICE O/P EST MOD 30 MIN: CPT | Performed by: INTERNAL MEDICINE

## 2020-09-24 PROCEDURE — 71046 X-RAY EXAM CHEST 2 VIEWS: CPT

## 2020-09-24 PROCEDURE — 99213 OFFICE O/P EST LOW 20 MIN: CPT

## 2020-09-24 RX ORDER — ANASTROZOLE 1 MG/1
1 TABLET ORAL DAILY
Qty: 90 TABLET | Refills: 1 | Status: SHIPPED
Start: 2020-09-24 | End: 2021-01-28 | Stop reason: SDUPTHER

## 2020-09-24 RX ORDER — ANASTROZOLE 1 MG/1
1 TABLET ORAL DAILY
Qty: 90 TABLET | Refills: 1 | Status: SHIPPED
Start: 2020-09-24 | End: 2020-09-24 | Stop reason: SDUPTHER

## 2020-09-24 NOTE — PROGRESS NOTES
Diagnosis Date    Breast cancer ([facility] 03/2018    Cancer ([facility]     Hypertension     Migraine headache 3/2/2015    Obesity (BMI 30.0-34.9) 3/20/2017     Medications:  Reviewed and reconciled. Allergies:  No Known Allergies    Physical Exam:  BP (!) 148/90   Pulse 70   Temp 98.2 °F (36.8 °C)   Ht 5' 6\" (1.676 m)   Wt 169 lb (76.7 kg)   SpO2 97%   BMI 27.28 kg/m²   GENERAL: Alert, oriented x 3, not in acute distress. HEENT: PERRLA; EOMI. Oropharynx clear. NECK: Supple. Without lymphadenopathy. LUNGS: Good air entry bilaterally. No wheezing, crackles or ronchi. BREASTS: Incisions intact. No palpable masses or LN. CARDIOVASCULAR: Regular rate. No murmurs, rubs or gallops. ABDOMEN: Soft. Non-tender, non-distended. Positive bowel sounds. EXTREMITIES: Without clubbing, cyanosis, or edema. NEUROLOGIC: No focal deficits. ECOG PS 1    Impression/Plan:  60 y.o. female status post Left simple mastectomy/SLNB and right prophylactic mastectomy on 04/09/2018. Left multifocal ER >90%/OR 90%, HER2 negative (0). 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 pT2 pN1a    Right breast [facility]    4/20/18 s/p Left ALND  Left axilla, lymph node dissection - One of three lymph nodes, positive for metastatic carcinoma (1/3). - Size of largest metastatic deposit: 3.5 mm.   - No extracapsular extension identified. - Fibroadipose tissue and skeletal muscle with changes consistent with prior surgery site    The final pathologic stage mpT2 pN1a. Stage IIB: (T2, N1, M0)  S/p adjuvant chemotherapy Dose dense AC x 4, followed by Taxol x 4 completed 08/28/2018. RT was completed on 11/15/2018. Baseline DEXA 10/18 osteopenia.     Currently on Arimidex since Nov 2018  DEXA scan 01/23/2020: osteopenia in LS and rosalee hips. On Ca/VitD. Tolerates Arimidex well. Arthralgias relieved by diclofenac. PND dry cough; CXR today. If negative, f/u with PCP  KARELY. Continue Arimidex, Ca/VitD.      RTC 4 months with DEXA scan same day    Edinson Conrad MD   8/00/0415  Board Certified Medical Oncologist

## 2020-10-08 ENCOUNTER — OFFICE VISIT (OUTPATIENT)
Dept: SURGERY | Age: 65
End: 2020-10-08
Payer: COMMERCIAL

## 2020-10-08 VITALS
HEIGHT: 66 IN | SYSTOLIC BLOOD PRESSURE: 169 MMHG | DIASTOLIC BLOOD PRESSURE: 90 MMHG | HEART RATE: 75 BPM | TEMPERATURE: 96.7 F | OXYGEN SATURATION: 99 % | BODY MASS INDEX: 27.64 KG/M2 | WEIGHT: 172 LBS

## 2020-10-08 PROCEDURE — 99243 OFF/OP CNSLTJ NEW/EST LOW 30: CPT | Performed by: SURGERY

## 2020-10-08 PROCEDURE — 99202 OFFICE O/P NEW SF 15 MIN: CPT | Performed by: SURGERY

## 2020-10-08 ASSESSMENT — ENCOUNTER SYMPTOMS
ALLERGIC/IMMUNOLOGIC NEGATIVE: 1
GASTROINTESTINAL NEGATIVE: 1
EYES NEGATIVE: 1
RESPIRATORY NEGATIVE: 1

## 2020-10-08 NOTE — LETTER
[address] [place] [address] Surgery  [address] [place] [address]   Phone: [phone]  Fax: [phone]    Alisson Hansen MD        October 8, 2020     [facility] [address] [facility] [address]    Patient: Flo Esipnosa  MR Number: [id number]  YOB: 1955  Date of Visit: 10/8/2020    Dear Dr. Sonya HernandezCount includes the [facility]: Thank you for the request for consultation for Jillyn Lennox to me for the evaluation of perineal cyst. Below are the relevant portions of my assessment and plan of care. I will excise this in the OR for the patient due to the location. If you have questions, please do not hesitate to call me. I look forward to following Hadassah Nageotte along with you.     Sincerely,        Alisson Hansen MD

## 2020-10-08 NOTE — PROGRESS NOTES
round, and reactive to light. Neck:      Musculoskeletal: Normal range of motion and neck supple. Cardiovascular:      Rate and Rhythm: Normal rate and regular rhythm. Pulses: Normal pulses. Heart sounds: Normal heart sounds. Pulmonary:      Effort: Pulmonary effort is normal.      Breath sounds: Normal breath sounds. Genitourinary:      Skin:     General: Skin is warm and dry. Neurological:      General: No focal deficit present. Mental Status: She is alert and oriented to person, place, and time. Psychiatric:         Mood and Affect: Mood normal.         Behavior: Behavior normal.         Thought Content: Thought content normal.         Judgment: Judgment normal.       ASSESSMENT/PLAN:  Cystic lesion of the perineum--I discussed excision in the OR due to location. I discussed r/b/a to procedure and she wishes to proceed. I spent 40 minutes personally with the patient/family/staff/resident(s) in examination, chart and imaging review, discussing natural history and prognosis, differential diagnosis, risks and benefits of treatment and instructions of which more than 50% of the time was spent for counseling and coordinating care.     Evelia Garza MD, MSc, FACS  10/8/2020  1:13 PM

## 2020-10-13 ENCOUNTER — TELEPHONE (OUTPATIENT)
Dept: SURGERY | Age: 65
End: 2020-10-13

## 2020-10-13 NOTE — TELEPHONE ENCOUNTER
Prior Authorization Form:      DEMOGRAPHICS:                     Patient Name:  Nolan Santiago  Patient :  1955            Insurance:  Payor: [facility] / Plan: [place] [facility] MARGARITA / Product Type: *No Product type* /   Insurance ID Number:    Payor/Plan Subscr  Sex Relation Sub. Ins. ID Effective Group Num   1.  [address] [facility] 1955 Female  [id number]                                    [id number]         DIAGNOSIS & PROCEDURE:                       Procedure/Operation: excision of perineal cyst            CPT Code: 29941    Diagnosis:  Perineal cyst     ICD10 Code: N90.89    [facility]  [facility]    Surgeon:  [email or web address]    SCHEDULING INFORMATION:                          Date: 2020    Time: 7:30am              Anesthesia:  LMAC                                                       Status:  Outpatient        Special Comments:  N/A       Electronically signed by Carla Ackerman MA on 10/13/2020 at 2:23 PM

## 2020-10-13 NOTE — TELEPHONE ENCOUNTER
MA Berlin Cotton called and spoke to [facility] and scheduled PT for excision of perineal cyst on 11/13/2020 @ 7:30am with Dr. Kermit Sandifer. PT denied taking any ASA/blood thinner products. PT verbalized she understood prep instructions, and NPO after midnight. PT verbalized that she understood appointment date/time, as well as to arrive 2 hours prior to procedure. PT advised PAT will call to go over all medication and advise on where to park and enter the hospital at for procedure. PT has been told to make sure they have a ride to and from procedure as they are not allowed to drive after procedure. PT procedure letter was mailed to them with all instructions also on it. MA instructed PT to call the office at [phone] with any questions, comments, or concerns about the procedure. MA scheduled patient post-op appointment for 12/7/2020 @ 8:30am in [place] office.         Electronically signed by Dede Monteiro MA on 10/13/20 at 2:22 PM EDT

## 2020-11-05 ENCOUNTER — PREP FOR PROCEDURE (OUTPATIENT)
Dept: SURGERY | Age: 65
End: 2020-11-05

## 2020-11-05 RX ORDER — SODIUM CHLORIDE 0.9 % (FLUSH) 0.9 %
10 SYRINGE (ML) INJECTION EVERY 12 HOURS SCHEDULED
Status: CANCELLED | OUTPATIENT
Start: 2020-11-05

## 2020-11-05 RX ORDER — SODIUM CHLORIDE 0.9 % (FLUSH) 0.9 %
10 SYRINGE (ML) INJECTION PRN
Status: CANCELLED | OUTPATIENT
Start: 2020-11-05

## 2020-11-05 NOTE — H&P
[facility]  HISTORY & PHYSICAL  ATTENDING NOTE     Referred by:  Dr. Carla Nova   Patient presents with    Cyst       Sebaceous cyst consult - Referral by Dr Romana Kaminski states it started during chemo, informed by Gyno to put hydrocortisone creame on it (summer of 2018) - Dr Marley Navarro referred her to us at last visit     Other       states its not really painful, states that it's getting bigger       HPI:  62y/o F presents with sebaceous cyst of the perineum. She states it has been present since prior to her chemo. She has completed chemo for her breast cancer and this cyst is now bigger. She denies drainage. She was advised to put hydrocortisone cream on it at one point.     Past Medical History        Past Medical History:   Diagnosis Date    [facility] ([facility] 2018    Cancer ([facility]      Hypertension      Migraine headache 3/2/2015    Obesity (BMI 30.0-34.9) 3/20/2017        Past Surgical History         Past Surgical History:   Procedure Laterality Date    BREAST SURGERY Bilateral       biopsies; non malignant    CHOLECYSTECTOMY        MASTECTOMY, BILATERAL        TONSILLECTOMY            Family History         Family History   Problem Relation Age of Onset    Cancer Mother           Breast CA    Heart Disease Mother      Diabetes Father      Other Father           Pancreatitis    Cancer Sister           Leukemia    Cancer Paternal Uncle           pancreativc for 1/colon for 2nd    Cancer Sister           Breast CA        Social History            Tobacco Use    Smoking status: Former Smoker       Packs/day: 0.50       Years: 12.00       Pack years: 6.00       Types: Cigarettes       Last attempt to quit: 1986       Years since quittin.1    Smokeless tobacco: Never Used   Substance Use Topics    Alcohol use:  No       Alcohol/week: 0.0 standard drinks       Comment: <1 drink/week    Drug use: Physical Exam  Constitutional:       Appearance: Normal appearance. HENT:      Head: Normocephalic and atraumatic. Eyes:      Extraocular Movements: Extraocular movements intact. Pupils: Pupils are equal, round, and reactive to light. Neck:      Musculoskeletal: Normal range of motion and neck supple. Cardiovascular:      Rate and Rhythm: Normal rate and regular rhythm. Pulses: Normal pulses. Heart sounds: Normal heart sounds. Pulmonary:      Effort: Pulmonary effort is normal.      Breath sounds: Normal breath sounds. Genitourinary:      Skin:     General: Skin is warm and dry. Neurological:      General: No focal deficit present. Mental Status: She is alert and oriented to person, place, and time. Psychiatric:         Mood and Affect: Mood normal.         Behavior: Behavior normal.         Thought Content: Thought content normal.         Judgment: Judgment normal.         ASSESSMENT/PLAN:  Cystic lesion of the perineum--I discussed excision in the OR due to location.   I discussed r/b/a to procedure and she wishes to proceed.       I spent 40 minutes personally with the patient/family/staff/resident(s) in examination, chart and imaging review, discussing natural history and prognosis, differential diagnosis, risks and benefits of treatment and instructions of which more than 50% of the time was spent for counseling and coordinating care.  Luisa Daly MD, MSc, FACS  10/8/2020  1:13 PM

## 2020-11-05 NOTE — H&P (VIEW-ONLY)
[facility]  HISTORY & PHYSICAL  ATTENDING NOTE     Referred by:  Dr. Rafa Lawson   Patient presents with    Cyst       Sebaceous cyst consult - Referral by Dr Karly Gongora states it started during chemo, informed by Gyno to put hydrocortisone creame on it (summer of 2018) - Dr Hayden Harvey referred her to us at last visit     Other       states its not really painful, states that it's getting bigger       HPI:  62y/o F presents with sebaceous cyst of the perineum. She states it has been present since prior to her chemo. She has completed chemo for her breast cancer and this cyst is now bigger. She denies drainage. She was advised to put hydrocortisone cream on it at one point.     Past Medical History        Past Medical History:   Diagnosis Date    Breast cancer ([facility] 2018    Cancer ([facility]      Hypertension      Migraine headache 3/2/2015    Obesity (BMI 30.0-34.9) 3/20/2017        Past Surgical History         Past Surgical History:   Procedure Laterality Date    BREAST SURGERY Bilateral       biopsies; non malignant    CHOLECYSTECTOMY        MASTECTOMY, BILATERAL        TONSILLECTOMY            Family History         Family History   Problem Relation Age of Onset    Cancer Mother           Breast CA    Heart Disease Mother      Diabetes Father      Other Father           Pancreatitis    Cancer Sister           Leukemia    Cancer Paternal Uncle           pancreativc for 1/colon for 2nd    Cancer Sister           Breast CA        Social History            Tobacco Use    Smoking status: Former Smoker       Packs/day: 0.50       Years: 12.00       Pack years: 6.00       Types: Cigarettes       Last attempt to quit: 1986       Years since quittin.1    Smokeless tobacco: Never Used   Substance Use Topics    Alcohol use:  No       Alcohol/week: 0.0 standard drinks       Comment: <1 drink/week    Drug use: No      No Known Allergies  Current Facility-Administered Medications          Current Outpatient Medications   Medication Sig Dispense Refill    anastrozole (ARIMIDEX) 1 MG tablet Take 1 tablet by mouth daily 90 tablet 1    diclofenac (VOLTAREN) 50 MG EC tablet Take 1 tablet by mouth 2 times daily 180 tablet 3    lisinopril (PRINIVIL;ZESTRIL) 10 MG tablet Indications: High Blood Pressure Disorder TAKE 1 TABLET DAILY 90 tablet 3    Omega-3 Fatty Acids (OMEGA-3 FISH OIL) 500 MG CAPS Take by mouth daily        Probiotic Product (PROBIOTIC-10) CAPS Take by mouth daily        vitamin C (ASCORBIC ACID) 500 MG tablet Take 500 mg by mouth daily        Cholecalciferol (VITAMIN D3) 2000 units CAPS Take 2,000 Units by mouth daily         Calcium Carb-Cholecalciferol (CALCIUM 1000 + D) 1000-800 MG-UNIT TABS Take 1 tablet by mouth daily (Patient taking differently: Take 1 tablet by mouth daily ) 90 tablet 1    b complex vitamins capsule Take 1 capsule by mouth daily.        acetaminophen (TYLENOL) 325 MG tablet Take 650 mg by mouth every 6 hours as needed for Pain.        Phenylephrine-Acetaminophen (SINUS HEADACHE PE MAX ST) 5-325 MG TABS Take  by mouth as needed.          No current facility-administered medications for this visit. Review of Systems   Constitutional: Negative. Negative for activity change, chills and unexpected weight change. HENT: Negative. Eyes: Negative. Respiratory: Negative. Cardiovascular: Negative. Gastrointestinal: Negative. Endocrine: Negative. Genitourinary: Negative. Musculoskeletal: Negative. Skin:        Perineal cyst   Allergic/Immunologic: Negative. Neurological: Negative. Hematological: Negative.     Psychiatric/Behavioral: Negative.       BP (!) 169/90 (Site: Right Upper Arm, Position: Sitting, Cuff Size: Medium Adult)   Pulse 75   Temp 96.7 °F (35.9 °C) (Infrared)   Ht 5' 6\" (1.676 m)   Wt 172 lb (78 kg)   SpO2 99%   BMI 27.76 kg/m²

## 2020-11-11 NOTE — PROGRESS NOTES
[address] [facility] PRE-ADMISSION TESTING GENERAL INSTRUCTIONS- [facility]-phone number:[phone]    GENERAL INSTRUCTIONS  [x] Antibacterial Soap shower Night before and/or AM of Surgery  [] Salvatore wipe instruction sheet and wipes given. [x] Nothing by mouth after midnight, including gum, candy, mints, or water. [x] You may brush your teeth, gargle, but do NOT swallow water. []Hibiclens shower  the night before and the morning of surgery. Do not use             Hibiclens on your face or head. [x]No smoking, chewing tobacco, illegal drugs, or alcohol within 24 hours of your surgery. [x] Jewelry, valuables or body piercing's should not be brought to the hospital. All body and/or tongue piercing's must be removed prior to arriving to hospital.  ALL hair pins must be removed. [x] Do not wear makeup, lotions, powders, deodorant. Nail polish as directed by the nurse. [x] Arrange transportation with a responsible adult  to and from the hospital. If you do not have a responsible adult  to transport you, you will need to make arrangements with a medical transportation company (i.e. [employer]. A Uber/taxi/bus is not appropriate unless you are accompanied by a responsible adult ). Arrange for someone to be with you for the remainder of the day and for 24 hours after your procedure due to having had anesthesia. Who will be your  for transportation?____HUSBAND______________   Who will be staying with you for 24 hrs after your procedure? __HUSBAND________________  [x] Bring insurance card and photo ID.  [] Transfusion Bracelet: Please bring with you to hospital, day of surgery  [] Bring urine specimen day of surgery. Any small container is acceptable. [] Use inhalers the morning of surgery and bring with you to hospital.  [] Bring copy of living will or healthcare power of  papers to be placed in your electronic record.   [] CPAP/BI-PAP: Please bring your machine if you are to spend the night in the hospital.     PARKING INSTRUCTIONS:   [x] Arrival Time:_0530____________  · [] Parking lot '\"I\"  is located on [facility] (the corner of [facility] and [facility]). To enter, press the button and the gate will lift. A free token will be provided to exit the lot. One car per patient is allowed to park in this lot. All other cars are to park on [address] [place] [address] either in the parking garage or the handicap lot. [x] To reach the [facility] lobby from [address] [place] [address], upon entering the hospital, take elevator B to the 3rd floor. EDUCATION INSTRUCTIONS:      [] Knee or hip replacement booklet & exercise pamphlets given. [] Shiv[address] placed in chart. [] Pre-admission Testing educational folder given  [] Incentive Spirometry,coughing & deep breathing exercises reviewed. []Medication information sheet(s)   []Fluoroscopy-Xray used in surgery reviewed with patient. Educational pamphlet placed in chart. [x]Pain: Post-op pain is normal and to be expected. You will be asked to rate your pain from 0-10(a zero is not acceptable-education is needed). Your post-op pain goal is:4[] Ask your nurse for your pain medication. [] Joint camp offered. [] Joint replacement booklets given. [] Other:___________________________    MEDICATION INSTRUCTIONS:   [x]Bring a complete list of your medications, please write the last time you took the medicine, give this list to the nurse. [x] Take the following medications the morning of surgery with 1-2 ounces of water:   [x] Stop herbal supplements and vitamins 5 days before your surgery. [] DO NOT take any diabetic medicine the morning of surgery. Follow instructions for insulin the day before surgery. [] If you are diabetic and your blood sugar is low or you feel symptomatic, you may drink 1-2 ounces of apple juice or take a glucose tablet.   The morning of your procedure, you may call the pre-op area if you have concerns about your blood sugar [phone]. [] Use your inhalers the morning of surgery. Bring your emergency inhaler with you day of surgery. [] Follow physician instructions regarding any blood thinners you may be taking. WHAT TO EXPECT:  [x] The day of surgery you will be greeted and checked in by the [facility] [employer] [facility].  In addition, you will be registered in the [facility] by a Patient Access Representative. Please bring your photo ID and insurance card. A nurse will greet you in accordance to the time you are needed in the pre-op area to prepare you for surgery. Please do not be discouraged if you are not greeted in the order you arrive as there are many variables that are involved in patient preparation. Your patience is greatly appreciated as you wait for your nurse. Please bring in items such as: books, magazines, newspapers, electronics, or any other items  to occupy your time in the waiting area. [x]  Delays may occur with surgery and staff will make a sincere effort to keep you informed of delays. If any delays occur with your procedure, we apologize ahead of time for your inconvenience as we recognize the value of your time.

## 2020-11-12 ENCOUNTER — ANESTHESIA EVENT (OUTPATIENT)
Dept: OPERATING ROOM | Age: 65
End: 2020-11-12
Payer: COMMERCIAL

## 2020-11-13 ENCOUNTER — ANESTHESIA (OUTPATIENT)
Dept: OPERATING ROOM | Age: 65
End: 2020-11-13
Payer: COMMERCIAL

## 2020-11-13 ENCOUNTER — HOSPITAL ENCOUNTER (OUTPATIENT)
Age: 65
Setting detail: OUTPATIENT SURGERY
Discharge: HOME HEALTH | End: 2020-11-13
Attending: SURGERY | Admitting: SURGERY
Payer: COMMERCIAL

## 2020-11-13 VITALS — DIASTOLIC BLOOD PRESSURE: 77 MMHG | OXYGEN SATURATION: 93 % | SYSTOLIC BLOOD PRESSURE: 145 MMHG

## 2020-11-13 VITALS
HEIGHT: 66 IN | HEART RATE: 78 BPM | OXYGEN SATURATION: 99 % | SYSTOLIC BLOOD PRESSURE: 142 MMHG | TEMPERATURE: 97.3 F | BODY MASS INDEX: 27.8 KG/M2 | WEIGHT: 173 LBS | RESPIRATION RATE: 16 BRPM | DIASTOLIC BLOOD PRESSURE: 66 MMHG

## 2020-11-13 PROCEDURE — 6360000002 HC RX W HCPCS

## 2020-11-13 PROCEDURE — 6360000002 HC RX W HCPCS: Performed by: SURGERY

## 2020-11-13 PROCEDURE — 88304 TISSUE EXAM BY PATHOLOGIST: CPT

## 2020-11-13 PROCEDURE — 7100000010 HC PHASE II RECOVERY - FIRST 15 MIN: Performed by: SURGERY

## 2020-11-13 PROCEDURE — 2709999900 HC NON-CHARGEABLE SUPPLY: Performed by: SURGERY

## 2020-11-13 PROCEDURE — 3700000000 HC ANESTHESIA ATTENDED CARE: Performed by: SURGERY

## 2020-11-13 PROCEDURE — 3600000002 HC SURGERY LEVEL 2 BASE: Performed by: SURGERY

## 2020-11-13 PROCEDURE — 3600000012 HC SURGERY LEVEL 2 ADDTL 15MIN: Performed by: SURGERY

## 2020-11-13 PROCEDURE — 3700000001 HC ADD 15 MINUTES (ANESTHESIA): Performed by: SURGERY

## 2020-11-13 PROCEDURE — 7100000011 HC PHASE II RECOVERY - ADDTL 15 MIN: Performed by: SURGERY

## 2020-11-13 PROCEDURE — 11423 EXC H-F-NK-SP B9+MARG 2.1-3: CPT | Performed by: SURGERY

## 2020-11-13 PROCEDURE — 2500000003 HC RX 250 WO HCPCS: Performed by: SURGERY

## 2020-11-13 PROCEDURE — 2580000003 HC RX 258

## 2020-11-13 RX ORDER — LIDOCAINE HYDROCHLORIDE AND EPINEPHRINE 10; 10 MG/ML; UG/ML
INJECTION, SOLUTION INFILTRATION; PERINEURAL PRN
Status: DISCONTINUED | OUTPATIENT
Start: 2020-11-13 | End: 2020-11-13 | Stop reason: ALTCHOICE

## 2020-11-13 RX ORDER — PROPOFOL 10 MG/ML
INJECTION, EMULSION INTRAVENOUS CONTINUOUS PRN
Status: DISCONTINUED | OUTPATIENT
Start: 2020-11-13 | End: 2020-11-13 | Stop reason: SDUPTHER

## 2020-11-13 RX ORDER — LIDOCAINE HYDROCHLORIDE 20 MG/ML
INJECTION, SOLUTION INTRAVENOUS PRN
Status: DISCONTINUED | OUTPATIENT
Start: 2020-11-13 | End: 2020-11-13 | Stop reason: SDUPTHER

## 2020-11-13 RX ORDER — FENTANYL CITRATE 50 UG/ML
INJECTION, SOLUTION INTRAMUSCULAR; INTRAVENOUS PRN
Status: DISCONTINUED | OUTPATIENT
Start: 2020-11-13 | End: 2020-11-13 | Stop reason: SDUPTHER

## 2020-11-13 RX ORDER — SODIUM CHLORIDE 0.9 % (FLUSH) 0.9 %
10 SYRINGE (ML) INJECTION PRN
Status: DISCONTINUED | OUTPATIENT
Start: 2020-11-13 | End: 2020-11-13 | Stop reason: HOSPADM

## 2020-11-13 RX ORDER — OXYCODONE HYDROCHLORIDE AND ACETAMINOPHEN 5; 325 MG/1; MG/1
1 TABLET ORAL EVERY 6 HOURS PRN
Qty: 8 TABLET | Refills: 0 | Status: SHIPPED | OUTPATIENT
Start: 2020-11-13 | End: 2020-11-15

## 2020-11-13 RX ORDER — SODIUM CHLORIDE 9 MG/ML
INJECTION, SOLUTION INTRAVENOUS CONTINUOUS PRN
Status: DISCONTINUED | OUTPATIENT
Start: 2020-11-13 | End: 2020-11-13 | Stop reason: SDUPTHER

## 2020-11-13 RX ORDER — SODIUM CHLORIDE 0.9 % (FLUSH) 0.9 %
10 SYRINGE (ML) INJECTION EVERY 12 HOURS SCHEDULED
Status: DISCONTINUED | OUTPATIENT
Start: 2020-11-13 | End: 2020-11-13 | Stop reason: HOSPADM

## 2020-11-13 RX ORDER — MIDAZOLAM HYDROCHLORIDE 1 MG/ML
INJECTION INTRAMUSCULAR; INTRAVENOUS PRN
Status: DISCONTINUED | OUTPATIENT
Start: 2020-11-13 | End: 2020-11-13 | Stop reason: SDUPTHER

## 2020-11-13 RX ADMIN — LIDOCAINE HYDROCHLORIDE 20 MG: 20 INJECTION, SOLUTION INTRAVENOUS at 08:24

## 2020-11-13 RX ADMIN — Medication 2 G: at 07:58

## 2020-11-13 RX ADMIN — FENTANYL CITRATE 20 MCG: 50 INJECTION, SOLUTION INTRAMUSCULAR; INTRAVENOUS at 08:25

## 2020-11-13 RX ADMIN — LIDOCAINE HYDROCHLORIDE 20 MG: 20 INJECTION, SOLUTION INTRAVENOUS at 08:27

## 2020-11-13 RX ADMIN — LIDOCAINE HYDROCHLORIDE 20 MG: 20 INJECTION, SOLUTION INTRAVENOUS at 08:32

## 2020-11-13 RX ADMIN — FENTANYL CITRATE 10 MCG: 50 INJECTION, SOLUTION INTRAMUSCULAR; INTRAVENOUS at 08:32

## 2020-11-13 RX ADMIN — FENTANYL CITRATE 20 MCG: 50 INJECTION, SOLUTION INTRAMUSCULAR; INTRAVENOUS at 08:29

## 2020-11-13 RX ADMIN — MIDAZOLAM 2 MG: 1 INJECTION INTRAMUSCULAR; INTRAVENOUS at 07:50

## 2020-11-13 RX ADMIN — FENTANYL CITRATE 50 MCG: 50 INJECTION, SOLUTION INTRAMUSCULAR; INTRAVENOUS at 07:55

## 2020-11-13 RX ADMIN — SODIUM CHLORIDE: 9 INJECTION, SOLUTION INTRAVENOUS at 07:51

## 2020-11-13 RX ADMIN — PROPOFOL 75 MCG/KG/MIN: 10 INJECTION, EMULSION INTRAVENOUS at 07:59

## 2020-11-13 RX ADMIN — LIDOCAINE HYDROCHLORIDE 40 MG: 20 INJECTION, SOLUTION INTRAVENOUS at 07:59

## 2020-11-13 ASSESSMENT — PULMONARY FUNCTION TESTS
PIF_VALUE: 1
PIF_VALUE: 0
PIF_VALUE: 1
PIF_VALUE: 1
PIF_VALUE: 0
PIF_VALUE: 1
PIF_VALUE: 0
PIF_VALUE: 1
PIF_VALUE: 0
PIF_VALUE: 1
PIF_VALUE: 0
PIF_VALUE: 0
PIF_VALUE: 1
PIF_VALUE: 1
PIF_VALUE: 0
PIF_VALUE: 1
PIF_VALUE: 0
PIF_VALUE: 1
PIF_VALUE: 0
PIF_VALUE: 0
PIF_VALUE: 1
PIF_VALUE: 0
PIF_VALUE: 1
PIF_VALUE: 1
PIF_VALUE: 0
PIF_VALUE: 1
PIF_VALUE: 1

## 2020-11-13 ASSESSMENT — PAIN SCALES - GENERAL
PAINLEVEL_OUTOF10: 0

## 2020-11-13 ASSESSMENT — PAIN - FUNCTIONAL ASSESSMENT: PAIN_FUNCTIONAL_ASSESSMENT: 0-10

## 2020-11-13 NOTE — INTERVAL H&P NOTE
Update History & Physical    The patient's History and Physical of November 5, 2020 was reviewed with the patient and I examined the patient. There was no change. The surgical site was confirmed by the patient and me. Plan: The risks, benefits, expected outcome, and alternative to the recommended procedure have been discussed with the patient. Patient understands and wants to proceed with the procedure.      Electronically signed by Harry Fox DO on 11/13/2020 at 7:03 AM

## 2020-11-13 NOTE — ANESTHESIA POSTPROCEDURE EVALUATION
Department of Anesthesiology  Postprocedure Note    Patient: Lamont Sensor  MRN: [id number]  YOB: 1955  Date of evaluation: 11/13/2020  Time:  9:12 AM     Procedure Summary     Date:  11/13/20 Room / Location:  [facility] OR 09 / [facility]    Anesthesia Start:  5930 Anesthesia Stop:  8100    Procedure:  EXCISION OF PERINEAL CYST (N/A ) Diagnosis:  (PERINEAL CYST)    Surgeon:  Yessi Blum MD Responsible Provider:  Jian Barnett MD    Anesthesia Type:  MAC ASA Status:  2          Anesthesia Type: MAC    Teresa Phase I: Teresa Score: 10    Teresa Phase II:      Last vitals: Reviewed and per EMR flowsheets.        Anesthesia Post Evaluation    Patient location during evaluation: PACU  Patient participation: complete - patient participated  Level of consciousness: awake and alert  Airway patency: patent  Nausea & Vomiting: no nausea and no vomiting  Complications: no  Cardiovascular status: hemodynamically stable and blood pressure returned to baseline  Respiratory status: acceptable  Hydration status: euvolemic

## 2020-11-13 NOTE — BRIEF OP NOTE
Brief Postoperative Note      Patient: Tanner Mccollum  YOB: 1955  MRN: [id number]    Date of Procedure: 11/13/2020    Pre-Op Diagnosis: PERINEAL CYST    Post-Op Diagnosis: Same       Procedure(s):  EXCISION OF PERINEAL CYST    Surgeon(s):  Nic Pineda MD    Assistant:  Resident: Selena Pallas, DO    Anesthesia: Monitor Anesthesia Care    Estimated Blood Loss (mL): Minimal    Complications: None    Specimens:   ID Type Source Tests Collected by Time Destination   A : Perineal Cyst-Short Superior and Long Lateral Tissue Tissue SURGICAL PATHOLOGY Nic Pineda MD 11/13/2020 2951        Implants:  * No implants in log *      Drains: * No LDAs found *    Findings: specimen was sent for pathology     Electronically signed by Selena Pallas, DO on 11/13/2020 at 8:43 AM

## 2020-11-13 NOTE — ANESTHESIA PRE PROCEDURE
Department of Anesthesiology  Preprocedure Note       Name:  Tucker Michel   Age:  60 y.o.  :  1955                                          MRN:  [id number]         Date:  2020      Surgeon: Mary Vergara):  Imtiaz Khan MD    Procedure: Procedure(s):  EXCISION OF PERINEAL CYST    Medications prior to admission:   Prior to Admission medications    Medication Sig Start Date End Date Taking? Authorizing Provider   anastrozole (ARIMIDEX) 1 MG tablet Take 1 tablet by mouth daily 20  Yes Jessie Monaco MD   diclofenac (VOLTAREN) 50 MG EC tablet Take 1 tablet by mouth 2 times daily 20 Yes Adriana Gage MD   lisinopril (PRINIVIL;ZESTRIL) 10 MG tablet Indications: High Blood Pressure Disorder TAKE 1 TABLET DAILY 20  Yes Rita Wasserman MD   Omega-3 Fatty Acids (OMEGA-3 FISH OIL) 500 MG CAPS Take by mouth daily   Yes Historical Provider, MD   Probiotic Product (PROBIOTIC-10) CAPS Take by mouth daily   Yes Historical Provider, MD   vitamin C (ASCORBIC ACID) 500 MG tablet Take 500 mg by mouth daily   Yes Historical Provider, MD   Cholecalciferol (VITAMIN D3) 2000 units CAPS Take 2,000 Units by mouth daily    Yes Historical Provider, MD   Calcium Carb-Cholecalciferol (CALCIUM 1000 + D) 1000-800 MG-UNIT TABS Take 1 tablet by mouth daily  Patient taking differently: Take 1 tablet by mouth daily  16  Yes Rita Wasserman MD   b complex vitamins capsule Take 1 capsule by mouth daily. Yes Historical Provider, MD   acetaminophen (TYLENOL) 325 MG tablet Take 650 mg by mouth every 6 hours as needed for Pain. Yes Historical Provider, MD   Phenylephrine-Acetaminophen (SINUS HEADACHE PE MAX ST) 5-325 MG TABS Take  by mouth as needed.    Yes Historical Provider, MD       Current medications:    Current Facility-Administered Medications   Medication Dose Route Frequency Provider Last Rate Last Dose    ceFAZolin (ANCEF) 2 g in sterile water 20 mL IV syringe  2 g Intravenous On Call to [address], MD        sodium chloride flush 0.9 % injection 10 mL  10 mL Intravenous 2 times per day Alexia Banuelos MD        sodium chloride flush 0.9 % injection 10 mL  10 mL Intravenous PRN Alexia Banuelos MD           Allergies:  No Known Allergies    Problem List:    Patient Active Problem List   Diagnosis Code    HTN (hypertension) I10    Dyslipidemia (high LDL) E78.5    Vitamin D insufficiency E55.9    Malignant neoplasm of upper-outer quadrant of left breast in female, estrogen receptor positive (HCC) C50.412, Z17.0       Past Medical History:        Diagnosis Date    [facility]    [facility] ([facility]     Hypertension     Migraine headache 3/2/2015    Obesity (BMI 30.0-34.9) 3/20/2017       Past Surgical History:        Procedure Laterality Date    BREAST SURGERY Bilateral     biopsies; non malignant    CHOLECYSTECTOMY      MASTECTOMY, BILATERAL      TONSILLECTOMY         Social History:    Social History     Tobacco Use    Smoking status: Former Smoker     Packs/day: 0.50     Years: 12.00     Pack years: 6.00     Types: Cigarettes     Last attempt to quit: 1986     Years since quittin.2    Smokeless tobacco: Never Used   Substance Use Topics    Alcohol use: No     Alcohol/week: 0.0 standard drinks     Comment: <1 drink/week                                Counseling given: Not Answered      Vital Signs (Current):   Vitals:    20 1241 20 0618   BP:  (!) 186/88   Pulse:  76   Resp:  20   Temp:  36.3 °C (97.3 °F)   TempSrc:  Temporal   SpO2:  98%   Weight: 172 lb (78 kg) 173 lb (78.5 kg)   Height: 5' 6\" (1.676 m) 5' 6\" (1.676 m)                                              BP Readings from Last 3 Encounters:   20 (!) 186/88   10/08/20 (!) 169/90   20 (!) 148/90       NPO Status: Time of last liquid consumption: 1800                        Time of last solid consumption: 1800                        Date of last liquid consumption: 11/12/20                        Date of last solid food consumption: 11/12/20    BMI:   Wt Readings from Last 3 Encounters:   11/13/20 173 lb (78.5 kg)   10/08/20 172 lb (78 kg)   09/24/20 169 lb (76.7 kg)     Body mass index is 27.92 kg/m². CBC:   Lab Results   Component Value Date    WBC 4.5 08/06/2020    RBC 4.46 08/06/2020    HGB 13.6 08/06/2020    HCT 42.7 08/06/2020    MCV 95.7 08/06/2020    RDW 13.1 08/06/2020     08/06/2020       CMP:   Lab Results   Component Value Date     08/06/2020    K 4.8 08/06/2020     08/06/2020    CO2 24 08/06/2020    BUN 15 08/06/2020    CREATININE 0.6 08/06/2020    GFRAA >60 08/06/2020    LABGLOM >60 08/06/2020    GLUCOSE 86 08/06/2020    PROT 7.5 08/06/2020    CALCIUM 10.3 08/06/2020    BILITOT 0.4 08/06/2020    ALKPHOS 101 08/06/2020    AST 22 08/06/2020    ALT 27 08/06/2020       POC Tests: No results for input(s): POCGLU, POCNA, POCK, POCCL, POCBUN, POCHEMO, POCHCT in the last 72 hours. Coags: No results found for: PROTIME, INR, APTT    HCG (If Applicable): No results found for: PREGTESTUR, PREGSERUM, HCG, HCGQUANT     ABGs: No results found for: PHART, PO2ART, KAY7NVL, RPQ9EWT, BEART, D3SINESL     Type & Screen (If Applicable):  No results found for: LABABO, LABRH    Drug/Infectious Status (If Applicable):  No results found for: HIV, HEPCAB    COVID-19 Screening (If Applicable): No results found for: COVID19      Anesthesia Evaluation   no history of anesthetic complications:   Airway: Mallampati: III  TM distance: >3 FB     Mouth opening: > = 3 FB Dental:      Comment: Dentition intact;  Pt denies any dental complications    Pulmonary:Negative Pulmonary ROS breath sounds clear to auscultation                             Cardiovascular:    (+) hypertension:, valvular problems/murmurs (congenital murmur):, murmur (congenital murmur),         Rhythm: regular  Rate: normal           Beta Blocker:  Not on Beta Blocker Neuro/Psych:   (+) headaches:,             GI/Hepatic/Renal: Neg GI/Hepatic/Renal ROS            Endo/Other:    (+) malignancy/cancer (breast CA, double mastectomy, OK to use R arm for NIBP and IV; chemo completed). Abdominal:           Vascular: negative vascular ROS. Anesthesia Plan      MAC     ASA 2     (#20 RIGHT hand)  Induction: intravenous. MIPS: Prophylactic antiemetics administered. Anesthetic plan and risks discussed with patient and spouse. Use of blood products discussed with patient whom consented to blood products. Plan discussed with CRNA and attending.                   Liv Ghosh RN   11/13/2020

## 2020-11-13 NOTE — PROGRESS NOTES
COVID testing completed on: not completed due to current hospital protocol  Results of the test: n/a  The patient verbally confirms that they did adhere to the self-quarantine guidelines. No signs or symptoms expressed or observed.

## 2020-11-13 NOTE — OP NOTE
[facility] [facility]  OPERATIVE REPORT    Carolann Carltongood  1955      DATE OF PROCEDURE: 11/13/2020     SURGEON: Red Rain MD, MSc, FACS     ASSISTANT: Osvaldo Montanez DO, PGY-I, Cj Navarro, MS_4     PREOPERATIVE DIAGNOSIS:  Perineal cyst.     POSTOPERATIVE DIAGNOSIS: Same    OPERATION: excision of perineal cyst (2.5 x 1.2 x 1.2cm)     ANESTHESIA: LMAC     ESTIMATED BLOOD LOSS: 91BG     COMPLICATIONS: None    SPECIMEN:  Perineal cyst    DRAINS:  none    HISTORY:  This is a 60 y. o.female with a perineal cyst for some time. It has gotten larger. She would like it removed. DESCRIPTION OF PROCEDURE: The patient was identified and the procedure was confirmed. Ancef 2g IV was given. She was placed in lithotomy position and prepped and draped in the standard surgical fashion. We anesthetized with 1% lidocaine with epinephrine and did a pudendal block. We made an elliptical incision with #15 blade scalpel. We excised the cyst with the cautery. Hemostasis achieved. The specimen marked with short superior and long lateral.  The wound irrigated with saline. The wound closed with 3-0 Vicryl and then 4-0 chromic. The wound cleaned. 4x4 and mesh underwear applied. Needle, sponge, and instrument counts were reported as correct x2. The patient tolerated the procedure and was transferred to the recovery area in satisfactory condition.     Hortensia Blackwood MD, MSc, FACS  11/13/2020  8:40 AM

## 2020-11-17 ENCOUNTER — TELEPHONE (OUTPATIENT)
Dept: SURGERY | Age: 65
End: 2020-11-17

## 2020-11-17 NOTE — TELEPHONE ENCOUNTER
I called patient with her pathology report. She is doing well. She has a little spotting of blood occasionally. She has a follow-up appointment next month.

## 2020-12-07 ENCOUNTER — OFFICE VISIT (OUTPATIENT)
Dept: SURGERY | Age: 65
End: 2020-12-07
Payer: COMMERCIAL

## 2020-12-07 VITALS
DIASTOLIC BLOOD PRESSURE: 96 MMHG | OXYGEN SATURATION: 97 % | WEIGHT: 175 LBS | RESPIRATION RATE: 14 BRPM | SYSTOLIC BLOOD PRESSURE: 160 MMHG | HEIGHT: 66 IN | TEMPERATURE: 97.2 F | BODY MASS INDEX: 28.12 KG/M2 | HEART RATE: 80 BPM

## 2020-12-07 PROCEDURE — 99212 OFFICE O/P EST SF 10 MIN: CPT | Performed by: SURGERY

## 2020-12-07 PROCEDURE — 17250 CHEM CAUT OF GRANLTJ TISSUE: CPT | Performed by: SURGERY

## 2020-12-07 PROCEDURE — 99024 POSTOP FOLLOW-UP VISIT: CPT | Performed by: SURGERY

## 2021-01-25 DIAGNOSIS — Z85.3 PERSONAL HISTORY OF BREAST CANCER: Primary | ICD-10-CM

## 2021-01-28 ENCOUNTER — HOSPITAL ENCOUNTER (OUTPATIENT)
Dept: INFUSION THERAPY | Age: 66
Discharge: HOME | End: 2021-01-28
Payer: COMMERCIAL

## 2021-01-28 ENCOUNTER — HOSPITAL ENCOUNTER (OUTPATIENT)
Dept: GENERAL RADIOLOGY | Age: 66
Discharge: HOME | End: 2021-01-30
Payer: COMMERCIAL

## 2021-01-28 ENCOUNTER — OFFICE VISIT (OUTPATIENT)
Dept: ONCOLOGY | Age: 66
End: 2021-01-28
Payer: COMMERCIAL

## 2021-01-28 VITALS
HEIGHT: 66 IN | OXYGEN SATURATION: 97 % | HEART RATE: 66 BPM | DIASTOLIC BLOOD PRESSURE: 72 MMHG | TEMPERATURE: 98.4 F | SYSTOLIC BLOOD PRESSURE: 142 MMHG | BODY MASS INDEX: 28.4 KG/M2 | WEIGHT: 176.7 LBS

## 2021-01-28 DIAGNOSIS — Z85.3 PERSONAL HISTORY OF BREAST CANCER: ICD-10-CM

## 2021-01-28 DIAGNOSIS — Z85.3 PERSONAL HISTORY OF BREAST CANCER: Primary | ICD-10-CM

## 2021-01-28 LAB
ALBUMIN SERPL-MCNC: 4.2 G/DL (ref 3.5–5.2)
ALP BLD-CCNC: 111 U/L (ref 35–104)
ALT SERPL-CCNC: 24 U/L (ref 0–32)
ANION GAP SERPL CALCULATED.3IONS-SCNC: 7 MMOL/L (ref 7–16)
AST SERPL-CCNC: 20 U/L (ref 0–31)
BASOPHILS ABSOLUTE: 0.04 E9/L (ref 0–0.2)
BASOPHILS RELATIVE PERCENT: 0.6 % (ref 0–2)
BILIRUB SERPL-MCNC: <0.2 MG/DL (ref 0–1.2)
BUN BLDV-MCNC: 19 MG/DL (ref 8–23)
CALCIUM SERPL-MCNC: 9.5 MG/DL (ref 8.6–10.2)
CHLORIDE BLD-SCNC: 105 MMOL/L (ref 98–107)
CO2: 29 MMOL/L (ref 22–29)
CREAT SERPL-MCNC: 0.7 MG/DL (ref 0.5–1)
EOSINOPHILS ABSOLUTE: 0.12 E9/L (ref 0.05–0.5)
EOSINOPHILS RELATIVE PERCENT: 1.9 % (ref 0–6)
GFR AFRICAN AMERICAN: >60
GFR NON-AFRICAN AMERICAN: >60 ML/MIN/1.73
GLUCOSE BLD-MCNC: 91 MG/DL (ref 74–99)
HCT VFR BLD CALC: 40.1 % (ref 34–48)
HEMOGLOBIN: 12.9 G/DL (ref 11.5–15.5)
IMMATURE GRANULOCYTES #: 0.03 E9/L
IMMATURE GRANULOCYTES %: 0.5 % (ref 0–5)
LYMPHOCYTES ABSOLUTE: 1.3 E9/L (ref 1.5–4)
LYMPHOCYTES RELATIVE PERCENT: 20.8 % (ref 20–42)
MCH RBC QN AUTO: 30.2 PG (ref 26–35)
MCHC RBC AUTO-ENTMCNC: 32.2 % (ref 32–34.5)
MCV RBC AUTO: 93.9 FL (ref 80–99.9)
MONOCYTES ABSOLUTE: 0.48 E9/L (ref 0.1–0.95)
MONOCYTES RELATIVE PERCENT: 7.7 % (ref 2–12)
NEUTROPHILS ABSOLUTE: 4.27 E9/L (ref 1.8–7.3)
NEUTROPHILS RELATIVE PERCENT: 68.5 % (ref 43–80)
PDW BLD-RTO: 12.2 FL (ref 11.5–15)
PLATELET # BLD: 223 E9/L (ref 130–450)
PMV BLD AUTO: 10.5 FL (ref 7–12)
POTASSIUM SERPL-SCNC: 4.2 MMOL/L (ref 3.5–5)
RBC # BLD: 4.27 E12/L (ref 3.5–5.5)
SODIUM BLD-SCNC: 141 MMOL/L (ref 132–146)
TOTAL PROTEIN: 7.2 G/DL (ref 6.4–8.3)
WBC # BLD: 6.2 E9/L (ref 4.5–11.5)

## 2021-01-28 PROCEDURE — 77080 DXA BONE DENSITY AXIAL: CPT

## 2021-01-28 PROCEDURE — 99214 OFFICE O/P EST MOD 30 MIN: CPT | Performed by: INTERNAL MEDICINE

## 2021-01-28 PROCEDURE — 36415 COLL VENOUS BLD VENIPUNCTURE: CPT

## 2021-01-28 PROCEDURE — 99212 OFFICE O/P EST SF 10 MIN: CPT

## 2021-01-28 PROCEDURE — 80053 COMPREHEN METABOLIC PANEL: CPT

## 2021-01-28 PROCEDURE — 85025 COMPLETE CBC W/AUTO DIFF WBC: CPT

## 2021-01-28 RX ORDER — ANASTROZOLE 1 MG/1
1 TABLET ORAL DAILY
Qty: 90 TABLET | Refills: 1 | Status: SHIPPED
Start: 2021-01-28 | End: 2021-07-29 | Stop reason: SDUPTHER

## 2021-01-28 NOTE — PROGRESS NOTES
Department of [facility] Oncology  Attending Clinic Note    Reason for Visit:  Follow-up on a patient with Left Breast Cancer    PCP:  Tanner Monroe MD    History of Present Illness:  60 y.o. female status post Left simple mastectomy/SLNB and right prophylactic mastectomy on 04/09/2018. Left multifocal ER >90%/NE 90%, HER2 negative (0). 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 pT2 pN1a    Right breast [facility]    4/20/18 s/p Left ALND  Left axilla, lymph node dissection - One of three lymph nodes, positive for metastatic carcinoma (1/3). - Size of largest metastatic deposit: 3.5 mm.   - No extracapsular extension identified. - Fibroadipose tissue and skeletal muscle with changes consistent with prior surgery site    The final pathologic stage is mpT2 pN1a. Stage IIB: (T2, N1, M0)  S/p adjuvant chemotherapy Dose dense AC x4, followed by Taxol x 4 completed 08/28/2018. RT was completed on 11/15/2018. Baseline DEXA 10/18 osteopenia. Currently on Arimidex since Nov 2018. Today 01/28/2021. Denies hot flashes, night sweats, mood swings/mood changes. Denies fever, chills, chest pain, SOB, or nausea. Arthralgias relieved by diclofenac. Review of Systems;  CONSTITUTIONAL: No fever, chills. Good appetite and energy level. ENMT: Eyes: No diplopia; Nose:No epistaxis. Mouth: No sore throat. RESPIRATORY: No hemoptysis, shortness of breath. CARDIOVASCULAR: No chest pain, palpitations. GASTROINTESTINAL: No nausea/vomiting, abdominal pain. MSK: Arthralgias relieved by diclofenac  GENITOURINARY: No dysuria, urinary frequency, hematuria. NEURO: No syncope, presyncope, headache.   Remainder:  ROS NEGATIVE    Past Medical History:      Diagnosis Date in LS and rosalee hips. On Ca/VitD. DEXA scan 01/28/2021 pending. On Ca. VitD    Tolerates Arimidex well. Arthralgias relieved by diclofenac. KARELY. Continue Arimidex, Ca/VitD.      RTC 6 months with Tamela Mercer MD   8/32/7882  Board Certified Medical Oncologist

## 2021-01-29 ENCOUNTER — TELEPHONE (OUTPATIENT)
Dept: ONCOLOGY | Age: 66
End: 2021-01-29

## 2021-01-29 NOTE — TELEPHONE ENCOUNTER
Called patient to update her on her results from her Dexa scan on 1/28/2021. We discussed use of prolia injection q6 months to protect her bones. Patient is agreeable and would like to go to her dental for clearance. Will get that information to us once she has her appointment.

## 2021-03-19 ENCOUNTER — IMMUNIZATION (OUTPATIENT)
Dept: PRIMARY CARE CLINIC | Age: 66
End: 2021-03-19
Payer: COMMERCIAL

## 2021-03-19 PROCEDURE — 0011A COVID-19, MODERNA VACCINE 100MCG/0.5ML DOSE: CPT | Performed by: NURSE PRACTITIONER

## 2021-03-19 PROCEDURE — 91301 COVID-19, MODERNA VACCINE 100MCG/0.5ML DOSE: CPT | Performed by: NURSE PRACTITIONER

## 2021-04-19 ENCOUNTER — IMMUNIZATION (OUTPATIENT)
Dept: PRIMARY CARE CLINIC | Age: 66
End: 2021-04-19
Payer: COMMERCIAL

## 2021-04-19 PROCEDURE — 0012A COVID-19, MODERNA VACCINE 100MCG/0.5ML DOSE: CPT | Performed by: NURSE PRACTITIONER

## 2021-04-19 PROCEDURE — 91301 COVID-19, MODERNA VACCINE 100MCG/0.5ML DOSE: CPT | Performed by: NURSE PRACTITIONER

## 2021-04-22 NOTE — PROGRESS NOTES
Patient called to inform office she will be turning 66 in June and will get new insurance that will pay a large portion of her Prolia. Therefore, she would like to begin her Prolia injections starting in June. She has dental clearence from her dentist that she will bring to that appt. I offered Fosamax as an option but patient declined and would prefer to wait for Prolia in June. She will bring her new insurance card with her as well.  Continue to take Ca/VitD while we wait to begin Prolia

## 2021-06-09 DIAGNOSIS — M17.0 BILATERAL PRIMARY OSTEOARTHRITIS OF KNEE: ICD-10-CM

## 2021-06-09 DIAGNOSIS — L72.3 SEBACEOUS CYST: ICD-10-CM

## 2021-06-09 NOTE — TELEPHONE ENCOUNTER
I called patient to inform. Patient asked if RX went to [facility]. Informed it was sent to [facility]. Patient stated this RX must go to her local pharmacy.   Informed Alissa Kimball MA.

## 2021-06-09 NOTE — TELEPHONE ENCOUNTER
Patient called states that has only enough medication for 1 more day. Has an appointment in July (35 Days)  Can you give her enough medication to last until her appointment? Thanks.

## 2021-06-09 NOTE — TELEPHONE ENCOUNTER
Can this medication be sent to [facility], I don't know why that it went to the mail order pharmacy. I clicked on [facility] [employer].

## 2021-06-10 ENCOUNTER — HOSPITAL ENCOUNTER (OUTPATIENT)
Dept: INFUSION THERAPY | Age: 66
Discharge: HOME | End: 2021-06-10
Payer: MEDICARE

## 2021-06-10 DIAGNOSIS — Z17.0 MALIGNANT NEOPLASM OF UPPER-OUTER QUADRANT OF LEFT BREAST IN FEMALE, ESTROGEN RECEPTOR POSITIVE (HCC): Primary | ICD-10-CM

## 2021-06-10 DIAGNOSIS — C50.412 MALIGNANT NEOPLASM OF UPPER-OUTER QUADRANT OF LEFT BREAST IN FEMALE, ESTROGEN RECEPTOR POSITIVE (HCC): Primary | ICD-10-CM

## 2021-06-10 PROCEDURE — 96372 THER/PROPH/DIAG INJ SC/IM: CPT

## 2021-06-10 PROCEDURE — 6360000002 HC RX W HCPCS: Performed by: INTERNAL MEDICINE

## 2021-06-10 RX ADMIN — DENOSUMAB 60 MG: 60 INJECTION SUBCUTANEOUS at 11:07

## 2021-06-29 ENCOUNTER — TELEPHONE (OUTPATIENT)
Dept: FAMILY MEDICINE CLINIC | Age: 66
End: 2021-06-29

## 2021-06-29 DIAGNOSIS — I10 ESSENTIAL HYPERTENSION: ICD-10-CM

## 2021-06-29 DIAGNOSIS — E55.9 VITAMIN D INSUFFICIENCY: ICD-10-CM

## 2021-06-29 DIAGNOSIS — Z00.00 ANNUAL PHYSICAL EXAM: Primary | ICD-10-CM

## 2021-06-30 ENCOUNTER — ANTI-COAG VISIT (OUTPATIENT)
Dept: FAMILY MEDICINE CLINIC | Age: 66
End: 2021-06-30

## 2021-07-13 ENCOUNTER — OFFICE VISIT (OUTPATIENT)
Dept: FAMILY MEDICINE CLINIC | Age: 66
End: 2021-07-13
Payer: MEDICARE

## 2021-07-13 VITALS
HEIGHT: 66 IN | TEMPERATURE: 98.2 F | SYSTOLIC BLOOD PRESSURE: 124 MMHG | RESPIRATION RATE: 16 BRPM | BODY MASS INDEX: 29.38 KG/M2 | WEIGHT: 182.8 LBS | DIASTOLIC BLOOD PRESSURE: 86 MMHG | HEART RATE: 74 BPM | OXYGEN SATURATION: 98 %

## 2021-07-13 DIAGNOSIS — Z00.00 ROUTINE GENERAL MEDICAL EXAMINATION AT A HEALTH CARE FACILITY: Primary | ICD-10-CM

## 2021-07-13 DIAGNOSIS — Z23 NEED FOR PROPHYLACTIC VACCINATION AGAINST STREPTOCOCCUS PNEUMONIAE (PNEUMOCOCCUS): ICD-10-CM

## 2021-07-13 DIAGNOSIS — I10 ESSENTIAL HYPERTENSION: ICD-10-CM

## 2021-07-13 DIAGNOSIS — Z76.0 ENCOUNTER FOR MEDICATION REFILL: ICD-10-CM

## 2021-07-13 DIAGNOSIS — M17.0 BILATERAL PRIMARY OSTEOARTHRITIS OF KNEE: ICD-10-CM

## 2021-07-13 PROCEDURE — G0009 ADMIN PNEUMOCOCCAL VACCINE: HCPCS | Performed by: FAMILY MEDICINE

## 2021-07-13 PROCEDURE — 90732 PPSV23 VACC 2 YRS+ SUBQ/IM: CPT | Performed by: FAMILY MEDICINE

## 2021-07-13 PROCEDURE — G0402 INITIAL PREVENTIVE EXAM: HCPCS | Performed by: FAMILY MEDICINE

## 2021-07-13 RX ORDER — LISINOPRIL 10 MG/1
TABLET ORAL
Qty: 90 TABLET | Refills: 3 | Status: SHIPPED | OUTPATIENT
Start: 2021-07-13 | End: 2022-07-15 | Stop reason: SDUPTHER

## 2021-07-13 RX ORDER — DENOSUMAB 60 MG/ML
60 INJECTION SUBCUTANEOUS ONCE
COMMUNITY

## 2021-07-13 RX ORDER — CHOLECALCIFEROL (VITAMIN D3) 25 MCG
TABLET,CHEWABLE ORAL
COMMUNITY

## 2021-07-13 SDOH — ECONOMIC STABILITY: FOOD INSECURITY: WITHIN THE PAST 12 MONTHS, THE FOOD YOU BOUGHT JUST DIDN'T LAST AND YOU DIDN'T HAVE MONEY TO GET MORE.: NEVER TRUE

## 2021-07-13 SDOH — ECONOMIC STABILITY: FOOD INSECURITY: WITHIN THE PAST 12 MONTHS, YOU WORRIED THAT YOUR FOOD WOULD RUN OUT BEFORE YOU GOT MONEY TO BUY MORE.: NEVER TRUE

## 2021-07-13 ASSESSMENT — PATIENT HEALTH QUESTIONNAIRE - PHQ9
2. FEELING DOWN, DEPRESSED OR HOPELESS: 0
1. LITTLE INTEREST OR PLEASURE IN DOING THINGS: 0
SUM OF ALL RESPONSES TO PHQ QUESTIONS 1-9: 0
SUM OF ALL RESPONSES TO PHQ QUESTIONS 1-9: 0
SUM OF ALL RESPONSES TO PHQ9 QUESTIONS 1 & 2: 0
SUM OF ALL RESPONSES TO PHQ QUESTIONS 1-9: 0

## 2021-07-13 ASSESSMENT — LIFESTYLE VARIABLES
AUDIT-C TOTAL SCORE: INCOMPLETE
HOW OFTEN DO YOU HAVE A DRINK CONTAINING ALCOHOL: 0
HOW OFTEN DO YOU HAVE A DRINK CONTAINING ALCOHOL: NEVER
AUDIT TOTAL SCORE: INCOMPLETE

## 2021-07-13 ASSESSMENT — SOCIAL DETERMINANTS OF HEALTH (SDOH): HOW HARD IS IT FOR YOU TO PAY FOR THE VERY BASICS LIKE FOOD, HOUSING, MEDICAL CARE, AND HEATING?: NOT HARD AT ALL

## 2021-07-13 NOTE — PROGRESS NOTES
Medicare Annual Wellness Visit  Name: Gerald Covington Date: 2021   MRN: [id number] Sex: Female   Age: 73 y.o. Ethnicity: Non-/Non    : 1955 Race: Chris Canales is here for Medicare AWV (medication refills, would like 90 day supply and prescriptions printed. )    Screenings for behavioral, psychosocial and functional/safety risks, and cognitive dysfunction are all negative except as indicated below. These results, as well as other patient data from the [address] form, are documented in Flowsheets linked to this Encounter. No Known Allergies    Prior to Visit Medications    Medication Sig Taking? Authorizing Provider   Cyanocobalamin (B-12) 2500 MCG TABS Take by mouth Yes Historical Provider, MD   denosumab (PROLIA) 60 MG/ML SOSY SC injection Inject 60 mg into the skin once Yes Historical Provider, MD   diclofenac (VOLTAREN) 50 MG EC tablet Take 1 tablet by mouth 2 times daily Yes Forrest Greer MD   anastrozole (ARIMIDEX) 1 MG tablet Take 1 tablet by mouth daily Yes Marv Lopez MD   lisinopril (PRINIVIL;ZESTRIL) 10 MG tablet Indications: High Blood Pressure Disorder TAKE 1 TABLET DAILY Yes Sri Gage MD   Omega-3 Fatty Acids (OMEGA-3 FISH OIL) 500 MG CAPS Take by mouth daily chewables Yes Historical Provider, MD   Probiotic Product (PROBIOTIC-10) CAPS Take by mouth daily Yes Historical Provider, MD   vitamin C (ASCORBIC ACID) 500 MG tablet Take 500 mg by mouth daily Yes Historical Provider, MD   Cholecalciferol (VITAMIN D3) 2000 units CAPS Take 2,000 Units by mouth daily  Yes Historical Provider, MD   Calcium Carb-Cholecalciferol (CALCIUM 1000 + D) 1000-800 MG-UNIT TABS Take 1 tablet by mouth daily  Patient taking differently: Take 1 tablet by mouth daily 1300 mg chews. Yes Forrest Greer MD   b complex vitamins capsule Take 1 capsule by mouth daily.  Yes Historical Provider, MD   Phenylephrine-Acetaminophen (SINUS HEADACHE PE MAX ST) 5-325 MG TABS Take  by mouth as needed. Yes Historical Provider, MD       Past Medical History:   Diagnosis Date    Breast cancer ([facility] [address] 03/2018    Cancer ([facility] [address]     Hypertension     Migraine headache 3/2/2015    Obesity (BMI 30.0-34.9) 3/20/2017       Past Surgical History:   Procedure Laterality Date    BREAST SURGERY Bilateral     biopsies; non malignant    CHOLECYSTECTOMY      GROIN SURGERY N/A 11/13/2020    EXCISION OF PERINEAL CYST performed by Triny Winkler MD at [address], BILATERAL      TONSILLECTOMY         Family History   Problem Relation Age of Onset    Cancer Mother         Breast CA    Heart Disease Mother     Diabetes Father     Other Father         Pancreatitis    Cancer Sister         Leukemia    Cancer Paternal Uncle         pancreativc for 1/colon for 2nd    Cancer Sister         Breast CA       CareTeam (Including outside providers/suppliers regularly involved in providing care):   Patient Care Team:  Jolene Mendez MD as PCP - General (Family Medicine)  Jolene Mendez MD as PCP - [facility] Empaneled Provider    Wt Readings from Last 3 Encounters:   07/13/21 182 lb 12.8 oz (82.9 kg)   01/28/21 176 lb 11.2 oz (80.2 kg)   12/07/20 175 lb (79.4 kg)     Vitals:    07/13/21 0951   BP: 124/86   Pulse: 74   Resp: 16   Temp: 98.2 °F (36.8 °C)   TempSrc: Infrared   SpO2: 98%   Weight: 182 lb 12.8 oz (82.9 kg)   Height: 5' 6\" (1.676 m)     Body mass index is 29.5 kg/m². Cognitive Function:  Based upon direct observation of the patient, evaluation of cognition reveals recent and remote memory intact. Patient's complete Health Risk Assessment and screening values have been reviewed and are found in Flowsheets. The following problems were reviewed today and where indicated follow up appointments were made and/or referrals ordered.     Positive Risk Factor Screenings with Interventions:          General Health and (Originally 9/19/2017)    Flu vaccine (1) 09/01/2021    Potassium monitoring  06/30/2022    Creatinine monitoring  06/30/2022    Lipid screen  06/30/2026    DEXA (modify frequency per FRAX score)  Completed    Shingles Vaccine  Completed    COVID-19 Vaccine  Completed    Hepatitis C screen  Completed    HIV screen  Completed    Hepatitis A vaccine  Aged Out    Hepatitis B vaccine  Aged Out    Hib vaccine  Aged Out    Meningococcal (ACWY) vaccine  Aged Out     Recommendations for SmartPill Due: see orders and patient instructions/AVS.  . Recommended screening schedule for the next 5-10 years is provided to the patient in written form: see Patient Instructions/AVS.    Rupali Headings was seen today for medicare aw. Diagnoses and all orders for this visit:    Welcome to [facility] Exam  -     Pneumococcal polysaccharide vaccine 23-valent PPSV23    Need for prophylactic vaccination against Streptococcus pneumoniae (pneumococcus)  -     Pneumococcal polysaccharide vaccine 23-valent PPSV23    Encounter for medication refill  -     diclofenac (VOLTAREN) 50 MG EC tablet; Take 1 tablet by mouth 2 times daily  -     lisinopril (PRINIVIL;ZESTRIL) 10 MG tablet; Indications: High Blood Pressure Disorder TAKE 1 TABLET DAILY    Bilateral primary osteoarthritis of knee: Stable and well controlled. Med refill  -     diclofenac (VOLTAREN) 50 MG EC tablet; Take 1 tablet by mouth 2 times daily    Essential hypertension: Stable and well controlled. Med refill  -     lisinopril (PRINIVIL;ZESTRIL) 10 MG tablet; Indications: High Blood Pressure Disorder TAKE 1 TABLET DAILY      Follow Up:  Return for Medicare Annual Wellness Visit (Initial) in 1 year.       Juhi Morocho MD

## 2021-07-13 NOTE — PATIENT INSTRUCTIONS
Personalized Preventive Plan for Celi Alexander - 7/13/2021  Medicare offers a range of preventive health benefits. Some of the tests and screenings are paid in full while other may be subject to a deductible, co-insurance, and/or copay. Some of these benefits include a comprehensive review of your medical history including lifestyle, illnesses that may run in your family, and various assessments and screenings as appropriate. After reviewing your medical record and screening and assessments performed today your provider may have ordered immunizations, labs, imaging, and/or referrals for you. A list of these orders (if applicable) as well as your Preventive Care list are included within your After Visit Summary for your review. Other Preventive Recommendations:    · A preventive eye exam performed by an eye specialist is recommended every 1-2 years to screen for glaucoma; cataracts, macular degeneration, and other eye disorders. · A preventive dental visit is recommended every 6 months. · Try to get at least 150 minutes of exercise per week or 10,000 steps per day on a pedometer . · Order or download the FREE \"Exercise & Physical Activity: Your Everyday Guide\" from [employer]. Call 9-297.554.4121 or search [employer] online. · You need 2490-7206 mg of calcium and 3365-1452 IU of vitamin D per day. It is possible to meet your calcium requirement with diet alone, but a vitamin D supplement is usually necessary to meet this goal.  · When exposed to the sun, use a sunscreen that protects against both UVA and UVB radiation with an SPF of 30 or greater. Reapply every 2 to 3 hours or after sweating, drying off with a towel, or swimming. · Always wear a seat belt when traveling in a car. Always wear a helmet when riding a bicycle or motorcycle.   · Pneumovax 23 vaccine today  · Optimal lifestyle: whole food diet, adequate hydration, regular activity, good sleep  · Consider getting a hearing test

## 2021-07-29 ENCOUNTER — HOSPITAL ENCOUNTER (OUTPATIENT)
Dept: INFUSION THERAPY | Age: 66
Discharge: HOME | End: 2021-07-29
Payer: MEDICARE

## 2021-07-29 ENCOUNTER — OFFICE VISIT (OUTPATIENT)
Dept: ONCOLOGY | Age: 66
End: 2021-07-29
Payer: MEDICARE

## 2021-07-29 VITALS
BODY MASS INDEX: 29.09 KG/M2 | WEIGHT: 181 LBS | TEMPERATURE: 98.2 F | HEART RATE: 82 BPM | DIASTOLIC BLOOD PRESSURE: 86 MMHG | OXYGEN SATURATION: 96 % | SYSTOLIC BLOOD PRESSURE: 170 MMHG | HEIGHT: 66 IN

## 2021-07-29 DIAGNOSIS — Z85.3 PERSONAL HISTORY OF BREAST CANCER: ICD-10-CM

## 2021-07-29 DIAGNOSIS — M81.0 OSTEOPOROSIS, UNSPECIFIED OSTEOPOROSIS TYPE, UNSPECIFIED PATHOLOGICAL FRACTURE PRESENCE: Primary | ICD-10-CM

## 2021-07-29 LAB
ALBUMIN SERPL-MCNC: 4.3 G/DL (ref 3.5–5.2)
ALP BLD-CCNC: 118 U/L (ref 35–104)
ALT SERPL-CCNC: 28 U/L (ref 0–32)
ANION GAP SERPL CALCULATED.3IONS-SCNC: 7 MMOL/L (ref 7–16)
AST SERPL-CCNC: 23 U/L (ref 0–31)
BASOPHILS ABSOLUTE: 0.04 E9/L (ref 0–0.2)
BASOPHILS RELATIVE PERCENT: 0.7 % (ref 0–2)
BILIRUB SERPL-MCNC: 0.3 MG/DL (ref 0–1.2)
BUN BLDV-MCNC: 13 MG/DL (ref 6–23)
CALCIUM SERPL-MCNC: 9.8 MG/DL (ref 8.6–10.2)
CHLORIDE BLD-SCNC: 104 MMOL/L (ref 98–107)
CO2: 29 MMOL/L (ref 22–29)
CREAT SERPL-MCNC: 0.6 MG/DL (ref 0.5–1)
EOSINOPHILS ABSOLUTE: 0.14 E9/L (ref 0.05–0.5)
EOSINOPHILS RELATIVE PERCENT: 2.3 % (ref 0–6)
GFR AFRICAN AMERICAN: >60
GFR NON-AFRICAN AMERICAN: >60 ML/MIN/1.73
GLUCOSE BLD-MCNC: 101 MG/DL (ref 74–99)
HCT VFR BLD CALC: 41.9 % (ref 34–48)
HEMOGLOBIN: 14 G/DL (ref 11.5–15.5)
IMMATURE GRANULOCYTES #: 0.04 E9/L
IMMATURE GRANULOCYTES %: 0.7 % (ref 0–5)
LYMPHOCYTES ABSOLUTE: 1.16 E9/L (ref 1.5–4)
LYMPHOCYTES RELATIVE PERCENT: 19.3 % (ref 20–42)
MCH RBC QN AUTO: 31.2 PG (ref 26–35)
MCHC RBC AUTO-ENTMCNC: 33.4 % (ref 32–34.5)
MCV RBC AUTO: 93.3 FL (ref 80–99.9)
MONOCYTES ABSOLUTE: 0.46 E9/L (ref 0.1–0.95)
MONOCYTES RELATIVE PERCENT: 7.7 % (ref 2–12)
NEUTROPHILS ABSOLUTE: 4.16 E9/L (ref 1.8–7.3)
NEUTROPHILS RELATIVE PERCENT: 69.3 % (ref 43–80)
PDW BLD-RTO: 12.3 FL (ref 11.5–15)
PLATELET # BLD: 254 E9/L (ref 130–450)
PMV BLD AUTO: 10.4 FL (ref 7–12)
POTASSIUM SERPL-SCNC: 4.4 MMOL/L (ref 3.5–5)
RBC # BLD: 4.49 E12/L (ref 3.5–5.5)
SODIUM BLD-SCNC: 140 MMOL/L (ref 132–146)
TOTAL PROTEIN: 7.8 G/DL (ref 6.4–8.3)
WBC # BLD: 6 E9/L (ref 4.5–11.5)

## 2021-07-29 PROCEDURE — 85025 COMPLETE CBC W/AUTO DIFF WBC: CPT

## 2021-07-29 PROCEDURE — 36415 COLL VENOUS BLD VENIPUNCTURE: CPT

## 2021-07-29 PROCEDURE — 99214 OFFICE O/P EST MOD 30 MIN: CPT | Performed by: INTERNAL MEDICINE

## 2021-07-29 PROCEDURE — 80053 COMPREHEN METABOLIC PANEL: CPT

## 2021-07-29 PROCEDURE — 99213 OFFICE O/P EST LOW 20 MIN: CPT

## 2021-07-29 RX ORDER — ANASTROZOLE 1 MG/1
1 TABLET ORAL DAILY
Qty: 90 TABLET | Refills: 1 | Status: SHIPPED | OUTPATIENT
Start: 2021-07-29 | End: 2022-04-14 | Stop reason: SDUPTHER

## 2021-07-29 RX ORDER — POLYETHYLENE GLYCOL 3350, SODIUM SULFATE, SODIUM CHLORIDE, POTASSIUM CHLORIDE, ASCORBIC ACID, SODIUM ASCORBATE 140-9-5.2G
KIT ORAL
COMMUNITY
Start: 2021-07-22

## 2021-07-29 NOTE — PROGRESS NOTES
Department of [facility] Oncology  Attending Clinic Note    Reason for Visit:  Follow-up on a patient with Left Breast Cancer    PCP:  Maggie Bey MD    History of Present Illness:  73 y.o. female status post Left simple mastectomy/SLNB and right prophylactic mastectomy on 04/09/2018. Left multifocal ER >90%/MI 90%, HER2 negative (0). 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 pT2 pN1a    Right breast [facility]    4/20/18 s/p Left ALND  Left axilla, lymph node dissection - One of three lymph nodes, positive for metastatic carcinoma (1/3). - Size of largest metastatic deposit: 3.5 mm.   - No extracapsular extension identified. - Fibroadipose tissue and skeletal muscle with changes consistent with prior surgery site    The final pathologic stage is mpT2 pN1a. Stage IIB: (T2, N1, M0)  S/p adjuvant chemotherapy Dose dense AC x4, followed by Taxol x 4 completed 08/28/2018. RT was completed on 11/15/2018. Baseline DEXA 10/18 osteopenia. Currently on Arimidex since Nov 2018. Today 07/29/2021. Denies hot flashes, night sweats, mood swings/mood changes. Denies fever, chills, chest pain, SOB, or nausea. Arthralgias manageable    Review of Systems;  CONSTITUTIONAL: No fever, chills. Good appetite and energy level. ENMT: Eyes: No diplopia; Nose:No epistaxis. Mouth: No sore throat. RESPIRATORY: No hemoptysis, shortness of breath. CARDIOVASCULAR: No chest pain, palpitations. GASTROINTESTINAL: No nausea/vomiting, abdominal pain. MSK: Arthralgias manageable  GENITOURINARY: No dysuria, urinary frequency, hematuria. NEURO: No syncope, presyncope, headache.   Remainder:  ROS NEGATIVE    Past Medical History:      Diagnosis Date    Breast cancer ([facility] [address] 03/2018    Cancer ([facility] [address]     Hypertension     Migraine headache 3/2/2015    Obesity (BMI 30.0-34.9) 3/20/2017     Medications:  Reviewed and reconciled. Allergies:  No Known Allergies    Physical Exam:  BP (!) 170/86   Pulse 82   Temp 98.2 °F (36.8 °C)   Ht 5' 6\" (1.676 m)   Wt 181 lb (82.1 kg)   SpO2 96%   BMI 29.21 kg/m²   GENERAL: Alert, oriented x 3, not in acute distress. HEENT: PERRLA; EOMI. Oropharynx clear. NECK: Supple. Without lymphadenopathy. LUNGS: Good air entry bilaterally. No wheezing, crackles or ronchi. BREASTS: Incisions intact. No palpable masses or LN. CARDIOVASCULAR: Regular rate. No murmurs, rubs or gallops. ABDOMEN: Soft. Non-tender, non-distended. Positive bowel sounds. EXTREMITIES: Without clubbing, cyanosis, or edema. NEUROLOGIC: No focal deficits. ECOG PS 1    Impression/Plan:  73 y.o. female status post Left simple mastectomy/SLNB and right prophylactic mastectomy on 04/09/2018. Left multifocal ER >90%/AR 90%, HER2 negative (0). 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 pT2 pN1a    Right breast [facility]    4/20/18 s/p Left ALND  Left axilla, lymph node dissection - One of three lymph nodes, positive for metastatic carcinoma (1/3). - Size of largest metastatic deposit: 3.5 mm.   - No extracapsular extension identified. - Fibroadipose tissue and skeletal muscle with changes consistent with prior surgery site    The final pathologic stage mpT2 pN1a. Stage IIB: (T2, N1, M0)  S/p adjuvant chemotherapy Dose dense AC x 4, followed by Taxol x 4 completed 08/28/2018. RT was completed on 11/15/2018. Baseline DEXA 10/18 osteopenia.     Currently on Arimidex since Nov 2018  DEXA scan 01/23/2020: osteopenia in LS and rosalee hips. On Ca/VitD. DEXA scan 01/28/2021: Bone mineral density of the lumbar spine is osteoporotic and previously osteopenic. Osteopenia of bilateral femoral necks, similar to prior. On Ca. VitD. Prolia q6m  Imaging reviewed. Labs reviewed. KARELY. Continue Arimidex, Ca/VitD. Prolia q6m    RTC 6 months with DEXA scan same day.     Rosanna Forman MD   7/80/5070  Board Certified Medical Oncologist

## 2021-09-09 ENCOUNTER — CLINICAL DOCUMENTATION (OUTPATIENT)
Dept: OTHER | Facility: CLINIC | Age: 66
End: 2021-09-09

## 2021-10-19 ENCOUNTER — HOSPITAL ENCOUNTER (EMERGENCY)
Age: 66
Discharge: HOME | End: 2021-10-19
Payer: MEDICARE

## 2021-10-19 ENCOUNTER — APPOINTMENT (OUTPATIENT)
Dept: GENERAL RADIOLOGY | Age: 66
End: 2021-10-19
Payer: MEDICARE

## 2021-10-19 VITALS
RESPIRATION RATE: 16 BRPM | BODY MASS INDEX: 29.66 KG/M2 | DIASTOLIC BLOOD PRESSURE: 83 MMHG | HEART RATE: 69 BPM | HEIGHT: 65 IN | WEIGHT: 178 LBS | OXYGEN SATURATION: 98 % | SYSTOLIC BLOOD PRESSURE: 192 MMHG | TEMPERATURE: 98 F

## 2021-10-19 DIAGNOSIS — S93.402A SPRAIN OF LEFT ANKLE, UNSPECIFIED LIGAMENT, INITIAL ENCOUNTER: Primary | ICD-10-CM

## 2021-10-19 PROCEDURE — 99212 OFFICE O/P EST SF 10 MIN: CPT

## 2021-10-19 PROCEDURE — 73610 X-RAY EXAM OF ANKLE: CPT

## 2021-10-19 ASSESSMENT — PAIN DESCRIPTION - ORIENTATION: ORIENTATION: LEFT

## 2021-10-19 ASSESSMENT — PAIN DESCRIPTION - LOCATION: LOCATION: ANKLE

## 2021-10-19 ASSESSMENT — PAIN SCALES - GENERAL: PAINLEVEL_OUTOF10: 8

## 2021-10-19 NOTE — ED PROVIDER NOTES
[address] [facility]  Department of Emergency Medicine  UC Encounter Note  10/19/21   7:36 PM EDT      NAME: Mode Lan  :  1955  MRN:  [id number]    Chief Complaint: Ankle Injury (pt slipped on a 1 step in her garage and twisted left ankle)      This is a 62-year-old female the presents to urgent care with left ankle pain and swelling today. Patient states that she slipped off one step in her garage and twisted her left ankle today. Patient does state pain with ambulation. She denies any numbness or tingling. On first contact patient she appears to be in no acute distress. She denies head neck back chest or other extremity pain. Review of Systems  Pertinent positives and negatives are stated within HPI, all other systems reviewed and are negative. Physical Exam  Vitals and nursing note reviewed. Constitutional:       Appearance: She is well-developed. HENT:      Head: Normocephalic and atraumatic. Right Ear: Hearing and external ear normal.      Left Ear: Hearing and external ear normal.      Nose: Nose normal.      Mouth/Throat:      Pharynx: Uvula midline. Eyes:      General: Lids are normal.      Conjunctiva/sclera: Conjunctivae normal.      Pupils: Pupils are equal, round, and reactive to light. Cardiovascular:      Rate and Rhythm: Normal rate and regular rhythm. Heart sounds: Normal heart sounds. No murmur heard. Pulmonary:      Effort: Pulmonary effort is normal.      Breath sounds: Normal breath sounds. Abdominal:      General: Bowel sounds are normal.      Palpations: Abdomen is soft. Abdomen is not rigid. Tenderness: There is no abdominal tenderness. There is no guarding or rebound. Musculoskeletal:      Cervical back: Normal range of motion and neck supple.       Left hip: Normal.      Left knee: Normal.      Left ankle:      Left Achilles Tendon: Normal.      Left foot: Normal.      Comments: Left ankle is tender and swollen without bruising. The ankle is stable. No open area no cyanosis. Has palpable pedal pulses. Skin:     General: Skin is warm and dry. Findings: No abrasion or rash. Neurological:      Mental Status: She is alert and oriented to person, place, and time. GCS: GCS eye subscore is 4. GCS verbal subscore is 5. GCS motor subscore is 6. Cranial Nerves: Cranial nerves are intact. No cranial nerve deficit. Sensory: Sensation is intact. No sensory deficit. Motor: Motor function is intact. Coordination: Coordination is intact. Coordination normal.      Gait: Gait normal.         Procedures    MDM  Number of Diagnoses or Management Options  Sprain of left ankle, unspecified ligament, initial encounter  Diagnosis management comments: X-ray of the ankle was reviewed. Placed in Ace wrap and Aircast splint. Instructions given.           --------------------------------------------- PAST HISTORY ---------------------------------------------  Past Medical History:  has a past medical history of Breast cancer ([facility] [address] Cancer ([facility] [address] Hypertension, Migraine headache, and Obesity (BMI 30.0-34.9). Past Surgical History:  has a past surgical history that includes Cholecystectomy; Tonsillectomy; Breast surgery (Bilateral); Mastectomy, bilateral; and Groin Surgery (N/A, 11/13/2020). Social History:  reports that she quit smoking about 35 years ago. Her smoking use included cigarettes. She has a 6.00 pack-year smoking history. She has never used smokeless tobacco. She reports that she does not drink alcohol and does not use drugs. Family History: family history includes Cancer in her mother, paternal uncle, sister, and sister; Diabetes in her father; Heart Disease in her mother; Other in her father. The patients home medications have been reviewed. Allergies: Patient has no known allergies.     -------------------------------------------------- RESULTS -------------------------------------------------  No results found for this visit on 10/19/21. XR ANKLE LEFT (MIN 3 VIEWS)   Final Result   No fracture or dislocation of the ankle.             ------------------------- NURSING NOTES AND VITALS REVIEWED ---------------------------   The nursing notes within the ED encounter and vital signs as below have been reviewed. BP (!) 192/83   Pulse 69   Temp 98 °F (36.7 °C)   Resp 16   Ht 5' 5\" (1.651 m)   Wt 178 lb (80.7 kg)   SpO2 98%   BMI 29.62 kg/m²   Oxygen Saturation Interpretation: Normal      ------------------------------------------ PROGRESS NOTES ------------------------------------------   I have spoken with the patient and discussed todays results, in addition to providing specific details for the plan of care and counseling regarding the diagnosis and prognosis. Their questions are answered at this time and they are agreeable with the plan.      --------------------------------- ADDITIONAL PROVIDER NOTES ---------------------------------     This patient is stable for discharge. I have shared the specific conditions for return, as well as the importance of follow-up. * NOTE: This report was transcribed using voice recognition software. Every effort was made to ensure accuracy; however, inadvertent computerized transcription errors may be present.    --------------------------------- IMPRESSION AND DISPOSITION ---------------------------------    IMPRESSION  1.  Sprain of left ankle, unspecified ligament, initial encounter        DISPOSITION  Disposition: Discharge to home  Patient condition is good       Estee Pierre PA-C  10/19/21 1938

## 2022-02-01 DIAGNOSIS — Z85.3 PERSONAL HISTORY OF BREAST CANCER: ICD-10-CM

## 2022-02-01 DIAGNOSIS — M81.0 OSTEOPOROSIS, UNSPECIFIED OSTEOPOROSIS TYPE, UNSPECIFIED PATHOLOGICAL FRACTURE PRESENCE: Primary | ICD-10-CM

## 2022-02-03 ENCOUNTER — HOSPITAL ENCOUNTER (OUTPATIENT)
Dept: INFUSION THERAPY | Age: 67
Discharge: HOME | End: 2022-02-03
Payer: MEDICARE

## 2022-02-03 ENCOUNTER — HOSPITAL ENCOUNTER (OUTPATIENT)
Dept: GENERAL RADIOLOGY | Age: 67
Discharge: HOME | End: 2022-02-05
Payer: MEDICARE

## 2022-02-03 ENCOUNTER — OFFICE VISIT (OUTPATIENT)
Dept: ONCOLOGY | Age: 67
End: 2022-02-03
Payer: MEDICARE

## 2022-02-03 VITALS
WEIGHT: 188 LBS | TEMPERATURE: 98.2 F | HEIGHT: 65 IN | SYSTOLIC BLOOD PRESSURE: 189 MMHG | HEART RATE: 68 BPM | DIASTOLIC BLOOD PRESSURE: 86 MMHG | BODY MASS INDEX: 31.32 KG/M2 | OXYGEN SATURATION: 95 %

## 2022-02-03 DIAGNOSIS — Z17.0 MALIGNANT NEOPLASM OF UPPER-OUTER QUADRANT OF LEFT BREAST IN FEMALE, ESTROGEN RECEPTOR POSITIVE (HCC): ICD-10-CM

## 2022-02-03 DIAGNOSIS — C50.412 MALIGNANT NEOPLASM OF UPPER-OUTER QUADRANT OF LEFT BREAST IN FEMALE, ESTROGEN RECEPTOR POSITIVE (HCC): ICD-10-CM

## 2022-02-03 DIAGNOSIS — Z85.3 PERSONAL HISTORY OF BREAST CANCER: Primary | ICD-10-CM

## 2022-02-03 DIAGNOSIS — M81.0 OSTEOPOROSIS, UNSPECIFIED OSTEOPOROSIS TYPE, UNSPECIFIED PATHOLOGICAL FRACTURE PRESENCE: ICD-10-CM

## 2022-02-03 LAB
ALBUMIN SERPL-MCNC: 4.3 G/DL (ref 3.5–5.2)
ALP BLD-CCNC: 124 U/L (ref 35–104)
ALT SERPL-CCNC: 34 U/L (ref 0–32)
ANION GAP SERPL CALCULATED.3IONS-SCNC: 8 MMOL/L (ref 7–16)
AST SERPL-CCNC: 25 U/L (ref 0–31)
BASOPHILS ABSOLUTE: 0.04 E9/L (ref 0–0.2)
BASOPHILS RELATIVE PERCENT: 0.7 % (ref 0–2)
BILIRUB SERPL-MCNC: 0.2 MG/DL (ref 0–1.2)
BUN BLDV-MCNC: 14 MG/DL (ref 6–23)
CALCIUM SERPL-MCNC: 9.7 MG/DL (ref 8.6–10.2)
CHLORIDE BLD-SCNC: 105 MMOL/L (ref 98–107)
CO2: 28 MMOL/L (ref 22–29)
CREAT SERPL-MCNC: 0.6 MG/DL (ref 0.5–1)
EOSINOPHILS ABSOLUTE: 0.12 E9/L (ref 0.05–0.5)
EOSINOPHILS RELATIVE PERCENT: 2 % (ref 0–6)
GFR AFRICAN AMERICAN: >60
GFR NON-AFRICAN AMERICAN: >60 ML/MIN/1.73
GLUCOSE BLD-MCNC: 104 MG/DL (ref 74–99)
HCT VFR BLD CALC: 40.6 % (ref 34–48)
HEMOGLOBIN: 13.2 G/DL (ref 11.5–15.5)
IMMATURE GRANULOCYTES #: 0.04 E9/L
IMMATURE GRANULOCYTES %: 0.7 % (ref 0–5)
LYMPHOCYTES ABSOLUTE: 1.04 E9/L (ref 1.5–4)
LYMPHOCYTES RELATIVE PERCENT: 17.2 % (ref 20–42)
MCH RBC QN AUTO: 30.6 PG (ref 26–35)
MCHC RBC AUTO-ENTMCNC: 32.5 % (ref 32–34.5)
MCV RBC AUTO: 94.2 FL (ref 80–99.9)
MONOCYTES ABSOLUTE: 0.48 E9/L (ref 0.1–0.95)
MONOCYTES RELATIVE PERCENT: 8 % (ref 2–12)
NEUTROPHILS ABSOLUTE: 4.31 E9/L (ref 1.8–7.3)
NEUTROPHILS RELATIVE PERCENT: 71.4 % (ref 43–80)
PDW BLD-RTO: 12.6 FL (ref 11.5–15)
PLATELET # BLD: 244 E9/L (ref 130–450)
PMV BLD AUTO: 10.4 FL (ref 7–12)
POTASSIUM SERPL-SCNC: 5.1 MMOL/L (ref 3.5–5)
RBC # BLD: 4.31 E12/L (ref 3.5–5.5)
SODIUM BLD-SCNC: 141 MMOL/L (ref 132–146)
TOTAL PROTEIN: 7.8 G/DL (ref 6.4–8.3)
WBC # BLD: 6 E9/L (ref 4.5–11.5)

## 2022-02-03 PROCEDURE — 96372 THER/PROPH/DIAG INJ SC/IM: CPT

## 2022-02-03 PROCEDURE — 99214 OFFICE O/P EST MOD 30 MIN: CPT | Performed by: INTERNAL MEDICINE

## 2022-02-03 PROCEDURE — 77080 DXA BONE DENSITY AXIAL: CPT

## 2022-02-03 PROCEDURE — 36415 COLL VENOUS BLD VENIPUNCTURE: CPT

## 2022-02-03 PROCEDURE — 99212 OFFICE O/P EST SF 10 MIN: CPT

## 2022-02-03 PROCEDURE — 6360000002 HC RX W HCPCS: Performed by: INTERNAL MEDICINE

## 2022-02-03 RX ADMIN — DENOSUMAB 60 MG: 60 INJECTION SUBCUTANEOUS at 11:19

## 2022-02-03 NOTE — PROGRESS NOTES
Department of [facility] Oncology      Attending Clinic Note    Reason for Visit:  Follow-up on a patient with Left Breast Cancer    PCP:  Will Manzano MD    History of Present Illness:  73 y.o. female status post Left simple mastectomy/SLNB and right prophylactic mastectomy on 04/09/2018. Left multifocal ER >90%/NC 90%, HER2 negative (0). 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 pT2 pN1a    Right breast [facility]    4/20/18 s/p Left ALND  Left axilla, lymph node dissection - One of three lymph nodes, positive for metastatic carcinoma (1/3). - Size of largest metastatic deposit: 3.5 mm.   - No extracapsular extension identified. - Fibroadipose tissue and skeletal muscle with changes consistent with prior surgery site    The final pathologic stage is mpT2 pN1a. Stage IIB: (T2, N1, M0)  S/p adjuvant chemotherapy Dose dense AC x4, followed by Taxol x 4 completed 08/28/2018. RT was completed on 11/15/2018. Baseline DEXA 10/18 osteopenia. Currently on Arimidex since Nov 2018. Today 02/03/2022. Denies fever, chills. Fair appetite. Mild fatigue. Mild arthralgias/myalgias. Review of Systems;  CONSTITUTIONAL: No fever, chills. Fair appetite. Mild fatigue  ENMT: Eyes: No diplopia; Nose:No epistaxis. Mouth: No sore throat. RESPIRATORY: No hemoptysis, shortness of breath. CARDIOVASCULAR: No chest pain, palpitations. GASTROINTESTINAL: No nausea/vomiting, abdominal pain. MSK: Mild arthralgias/myalgias. GENITOURINARY: No dysuria, urinary frequency, hematuria. NEURO: No syncope, presyncope, headache.   Remainder:  ROS NEGATIVE    Past Medical History:      Diagnosis Date    Breast cancer ([facility] [address] 03/2018    Cancer ([facility] [address]     Hypertension     Migraine headache 3/2/2015    Obesity (BMI 30.0-34.9) 3/20/2017     Medications:  Reviewed and reconciled. Allergies:  No Known Allergies    Physical Exam:  BP (!) 189/86   Pulse 68   Temp 98.2 °F (36.8 °C)   Ht 5' 5\" (1.651 m)   Wt 188 lb (85.3 kg)   SpO2 95%   BMI 31.28 kg/m²   GENERAL: Alert, oriented x 3, not in acute distress. HEENT: PERRLA; EOMI. Oropharynx clear. BREASTS: Incisions intact. No palpable masses or LN. EXTREMITIES: Without clubbing, cyanosis, or edema. NEUROLOGIC: No focal deficits. ECOG PS 1    Lab Results   Component Value Date    WBC 6.0 02/03/2022    HGB 13.2 02/03/2022    HCT 40.6 02/03/2022    MCV 94.2 02/03/2022     02/03/2022     Impression/Plan:  73 y.o. female status post Left simple mastectomy/SLNB and right prophylactic mastectomy on 04/09/2018. Left multifocal ER >90%/MT 90%, HER2 negative (0). 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 pT2 pN1a    Right breast [facility]    4/20/18 s/p Left ALND  Left axilla, lymph node dissection - One of three lymph nodes, positive for metastatic carcinoma (1/3). - Size of largest metastatic deposit: 3.5 mm.   - No extracapsular extension identified. - Fibroadipose tissue and skeletal muscle with changes consistent with prior surgery site    The final pathologic stage mpT2 pN1a. Stage IIB: (T2, N1, M0)  S/p adjuvant chemotherapy Dose dense AC x 4, followed by Taxol x 4 completed 08/28/2018. RT was completed on 11/15/2018. Baseline DEXA 10/18 osteopenia. Currently on Arimidex since Nov 2018    DEXA scan 01/23/2020: osteopenia in LS and rosalee hips. On Ca/VitD. DEXA scan 01/28/2021: Bone mineral density of the lumbar spine is osteoporotic and previously osteopenic. Osteopenia of bilateral femoral necks, similar to prior. On Ca. VitD. Prolia q6m  Left Ankle X-Ray 10/19/2021 No fracture or dislocation of the ankle  DEXA scan 02/03/2022 pending. On Ca. VitD. Prolia q6m  Imaging reviewed. Labs reviewed. KARELY. Continue Arimidex, Ca/VitD.  Prolia q6m    RTC 6 months with Prolia same day    Daniel Orozco MD   3/7/0249  Board Certified Medical Oncologist

## 2022-02-10 ENCOUNTER — TELEPHONE (OUTPATIENT)
Dept: FAMILY MEDICINE CLINIC | Age: 67
End: 2022-02-10

## 2022-02-10 DIAGNOSIS — E55.9 VITAMIN D INSUFFICIENCY: ICD-10-CM

## 2022-02-10 DIAGNOSIS — I10 PRIMARY HYPERTENSION: Primary | ICD-10-CM

## 2022-02-10 DIAGNOSIS — E78.5 DYSLIPIDEMIA (HIGH LDL; LOW HDL): ICD-10-CM

## 2022-02-10 NOTE — TELEPHONE ENCOUNTER
Returned patient call regarding labs prior to next office visit. Informed patient that will send Dr Gilbert Rome a message that the lab orders need to be put in and that can just come to the lab and have the blood work done, (able to pull the labs up on the computer) no appointment. Patient voiced understanding and will get labs done prior to visit.

## 2022-02-10 NOTE — TELEPHONE ENCOUNTER
----- Message from Ben Bedoya sent at 2/10/2022  2:40 PM EST -----  Subject: Message to Provider    QUESTIONS  Information for Provider? Pt is scheduled for AWV on 7/13, needs to have   the bloodwork placed on file and to schedule the appointment for that.   ---------------------------------------------------------------------------  --------------  [address]  What is the best way for the office to contact you? OK to leave message on   voicemail, OK to respond with electronic message via [employer] portal (only   for patients who have registered [employer] account)  Preferred Call Back Phone Number? [phone]  ---------------------------------------------------------------------------  --------------  SCRIPT ANSWERS  Relationship to Patient?  Self

## 2022-02-10 NOTE — TELEPHONE ENCOUNTER
----- Message from Haily Chavez sent at 2/10/2022  2:40 PM EST -----  Subject: Message to Provider    QUESTIONS  Information for Provider? Pt is scheduled for AWV on 7/13, needs to have   the bloodwork placed on file and to schedule the appointment for that.   ---------------------------------------------------------------------------  --------------  [address]  What is the best way for the office to contact you? OK to leave message on   voicemail, OK to respond with electronic message via [employer] portal (only   for patients who have registered [employer] account)  Preferred Call Back Phone Number? [phone]  ---------------------------------------------------------------------------  --------------  SCRIPT ANSWERS  Relationship to Patient?  Self

## 2022-04-14 RX ORDER — ANASTROZOLE 1 MG/1
1 TABLET ORAL DAILY
Qty: 90 TABLET | Refills: 1 | Status: SHIPPED
Start: 2022-04-14 | End: 2022-08-04 | Stop reason: SDUPTHER

## 2022-07-06 DIAGNOSIS — I10 PRIMARY HYPERTENSION: ICD-10-CM

## 2022-07-06 DIAGNOSIS — E55.9 VITAMIN D INSUFFICIENCY: ICD-10-CM

## 2022-07-06 DIAGNOSIS — E78.5 DYSLIPIDEMIA (HIGH LDL; LOW HDL): ICD-10-CM

## 2022-07-06 LAB
ALBUMIN SERPL-MCNC: 4.3 G/DL (ref 3.5–5.2)
ALP BLD-CCNC: 99 U/L (ref 35–104)
ALT SERPL-CCNC: 28 U/L (ref 0–32)
ANION GAP SERPL CALCULATED.3IONS-SCNC: 13 MMOL/L (ref 7–16)
AST SERPL-CCNC: 19 U/L (ref 0–31)
BASOPHILS ABSOLUTE: 0.03 E9/L (ref 0–0.2)
BASOPHILS RELATIVE PERCENT: 0.6 % (ref 0–2)
BILIRUB SERPL-MCNC: 0.3 MG/DL (ref 0–1.2)
BUN BLDV-MCNC: 11 MG/DL (ref 6–23)
CALCIUM SERPL-MCNC: 9.5 MG/DL (ref 8.6–10.2)
CHLORIDE BLD-SCNC: 107 MMOL/L (ref 98–107)
CHOLESTEROL, TOTAL: 218 MG/DL (ref 0–199)
CO2: 23 MMOL/L (ref 22–29)
CREAT SERPL-MCNC: 0.7 MG/DL (ref 0.5–1)
EOSINOPHILS ABSOLUTE: 0.14 E9/L (ref 0.05–0.5)
EOSINOPHILS RELATIVE PERCENT: 2.6 % (ref 0–6)
GFR AFRICAN AMERICAN: >60
GFR NON-AFRICAN AMERICAN: >60 ML/MIN/1.73
GLUCOSE BLD-MCNC: 87 MG/DL (ref 74–99)
HCT VFR BLD CALC: 40.1 % (ref 34–48)
HDLC SERPL-MCNC: 61 MG/DL
HEMOGLOBIN: 12.5 G/DL (ref 11.5–15.5)
IMMATURE GRANULOCYTES #: 0.01 E9/L
IMMATURE GRANULOCYTES %: 0.2 % (ref 0–5)
LDL CHOLESTEROL CALCULATED: 126 MG/DL (ref 0–99)
LYMPHOCYTES ABSOLUTE: 1.14 E9/L (ref 1.5–4)
LYMPHOCYTES RELATIVE PERCENT: 21.3 % (ref 20–42)
MCH RBC QN AUTO: 29.5 PG (ref 26–35)
MCHC RBC AUTO-ENTMCNC: 31.2 % (ref 32–34.5)
MCV RBC AUTO: 94.6 FL (ref 80–99.9)
MONOCYTES ABSOLUTE: 0.38 E9/L (ref 0.1–0.95)
MONOCYTES RELATIVE PERCENT: 7.1 % (ref 2–12)
NEUTROPHILS ABSOLUTE: 3.64 E9/L (ref 1.8–7.3)
NEUTROPHILS RELATIVE PERCENT: 68.2 % (ref 43–80)
PDW BLD-RTO: 13.2 FL (ref 11.5–15)
PLATELET # BLD: 241 E9/L (ref 130–450)
PMV BLD AUTO: 11.4 FL (ref 7–12)
POTASSIUM SERPL-SCNC: 4.6 MMOL/L (ref 3.5–5)
RBC # BLD: 4.24 E12/L (ref 3.5–5.5)
SODIUM BLD-SCNC: 143 MMOL/L (ref 132–146)
TOTAL PROTEIN: 7.3 G/DL (ref 6.4–8.3)
TRIGL SERPL-MCNC: 155 MG/DL (ref 0–149)
TSH SERPL DL<=0.05 MIU/L-ACNC: 0.96 UIU/ML (ref 0.27–4.2)
VITAMIN D 25-HYDROXY: 70 NG/ML (ref 30–100)
VLDLC SERPL CALC-MCNC: 31 MG/DL
WBC # BLD: 5.3 E9/L (ref 4.5–11.5)

## 2022-07-13 SDOH — HEALTH STABILITY: PHYSICAL HEALTH
ON AVERAGE, HOW MANY DAYS PER WEEK DO YOU ENGAGE IN MODERATE TO STRENUOUS EXERCISE (LIKE A BRISK WALK)?: PATIENT DECLINED

## 2022-07-13 ASSESSMENT — PATIENT HEALTH QUESTIONNAIRE - PHQ9
SUM OF ALL RESPONSES TO PHQ QUESTIONS 1-9: 0
SUM OF ALL RESPONSES TO PHQ9 QUESTIONS 1 & 2: 0
SUM OF ALL RESPONSES TO PHQ QUESTIONS 1-9: 0
SUM OF ALL RESPONSES TO PHQ QUESTIONS 1-9: 0
1. LITTLE INTEREST OR PLEASURE IN DOING THINGS: 0
2. FEELING DOWN, DEPRESSED OR HOPELESS: 0
SUM OF ALL RESPONSES TO PHQ QUESTIONS 1-9: 0

## 2022-07-13 ASSESSMENT — LIFESTYLE VARIABLES
HOW OFTEN DO YOU HAVE A DRINK CONTAINING ALCOHOL: NEVER
HOW OFTEN DO YOU HAVE A DRINK CONTAINING ALCOHOL: 1

## 2022-07-15 ENCOUNTER — OFFICE VISIT (OUTPATIENT)
Dept: FAMILY MEDICINE CLINIC | Age: 67
End: 2022-07-15
Payer: MEDICARE

## 2022-07-15 VITALS
SYSTOLIC BLOOD PRESSURE: 130 MMHG | WEIGHT: 186 LBS | HEART RATE: 69 BPM | BODY MASS INDEX: 30.99 KG/M2 | OXYGEN SATURATION: 97 % | DIASTOLIC BLOOD PRESSURE: 76 MMHG | TEMPERATURE: 98 F | RESPIRATION RATE: 18 BRPM | HEIGHT: 65 IN

## 2022-07-15 DIAGNOSIS — G89.29 CHRONIC PAIN OF RIGHT KNEE: ICD-10-CM

## 2022-07-15 DIAGNOSIS — I10 ESSENTIAL HYPERTENSION: ICD-10-CM

## 2022-07-15 DIAGNOSIS — M25.561 CHRONIC PAIN OF RIGHT KNEE: ICD-10-CM

## 2022-07-15 DIAGNOSIS — M17.0 BILATERAL PRIMARY OSTEOARTHRITIS OF KNEE: ICD-10-CM

## 2022-07-15 DIAGNOSIS — Z91.81 AT HIGH RISK FOR FALLS: ICD-10-CM

## 2022-07-15 DIAGNOSIS — Z00.00 INITIAL MEDICARE ANNUAL WELLNESS VISIT: Primary | ICD-10-CM

## 2022-07-15 DIAGNOSIS — Z76.0 ENCOUNTER FOR MEDICATION REFILL: ICD-10-CM

## 2022-07-15 PROCEDURE — G0438 PPPS, INITIAL VISIT: HCPCS | Performed by: FAMILY MEDICINE

## 2022-07-15 PROCEDURE — 1123F ACP DISCUSS/DSCN MKR DOCD: CPT | Performed by: FAMILY MEDICINE

## 2022-07-15 RX ORDER — LISINOPRIL 10 MG/1
TABLET ORAL
Qty: 90 TABLET | Refills: 3 | Status: SHIPPED | OUTPATIENT
Start: 2022-07-15 | End: 2023-07-15

## 2022-07-15 RX ORDER — MAGNESIUM 200 MG
200 TABLET ORAL DAILY
COMMUNITY

## 2022-07-15 RX ORDER — MELATONIN 10 MG
10 CAPSULE ORAL NIGHTLY
COMMUNITY

## 2022-07-15 SDOH — ECONOMIC STABILITY: FOOD INSECURITY: WITHIN THE PAST 12 MONTHS, THE FOOD YOU BOUGHT JUST DIDN'T LAST AND YOU DIDN'T HAVE MONEY TO GET MORE.: NEVER TRUE

## 2022-07-15 SDOH — ECONOMIC STABILITY: FOOD INSECURITY: WITHIN THE PAST 12 MONTHS, YOU WORRIED THAT YOUR FOOD WOULD RUN OUT BEFORE YOU GOT MONEY TO BUY MORE.: NEVER TRUE

## 2022-07-15 ASSESSMENT — SOCIAL DETERMINANTS OF HEALTH (SDOH): HOW HARD IS IT FOR YOU TO PAY FOR THE VERY BASICS LIKE FOOD, HOUSING, MEDICAL CARE, AND HEATING?: NOT HARD AT ALL

## 2022-07-15 NOTE — PATIENT INSTRUCTIONS
Personalized Preventive Plan for Terrell Baker - 7/15/2022  Medicare offers a range of preventive health benefits. Some of the tests and screenings are paid in full while other may be subject to a deductible, co-insurance, and/or copay. Some of these benefits include a comprehensive review of your medical history including lifestyle, illnesses that may run in your family, and various assessments and screenings as appropriate. After reviewing your medical record and screening and assessments performed today your provider may have ordered immunizations, labs, imaging, and/or referrals for you. A list of these orders (if applicable) as well as your Preventive Care list are included within your After Visit Summary for your review. Other Preventive Recommendations:    A preventive eye exam performed by an eye specialist is recommended every 1-2 years to screen for glaucoma; cataracts, macular degeneration, and other eye disorders. A preventive dental visit is recommended every 6 months. Try to get at least 150 minutes of exercise per week or 10,000 steps per day on a pedometer . Order or download the FREE \"Exercise & Physical Activity: Your Everyday Guide\" from [employer]. Call 5-656.569.4965 or search [employer] online. You need 7734-3708 mg of calcium and 3144-2057 IU of vitamin D per day. It is possible to meet your calcium requirement with diet alone, but a vitamin D supplement is usually necessary to meet this goal.  When exposed to the sun, use a sunscreen that protects against both UVA and UVB radiation with an SPF of 30 or greater. Reapply every 2 to 3 hours or after sweating, drying off with a towel, or swimming. Always wear a seat belt when traveling in a car. Always wear a helmet when riding a bicycle or motorcycle.      Cardiovascular Disease Risk Counseling: Assessed the patient's risk to develop cardiovascular disease and reviewed main risk diet, you should limit your cholesterol intake to less than 200 mg per day. It is normal and important to have some cholesterol in your bloodstream. But too much cholesterol can cause plaque to build up within your arteries, which can eventually lead to a heart attack or stroke. The two types of cholesterol that are most commonly referred to are:   Low-density lipoprotein (LDL) cholesterol  Also known as bad cholesterol, this is the cholesterol that tends to build up along your arteries. Bad cholesterol levels are increased by eating fats that are saturated or hydrogenated. Optimal level of this cholesterol is less than 100. Over 130 starts to get risky for heart disease. High-density lipoprotein (HDL) cholesterol  Also known as good cholesterol, this type of cholesterol actually carries cholesterol away from your arteries and may, therefore, help lower your risk of having a heart attack. You want this level to be high (ideally greater than 60). It is a risk to have a level less than 40. You can raise this good cholesterol by eating olive oil, canola oil, avocados, or nuts. Exercise raises this level, too. Fat    Fat is calorie dense and packs a lot of calories into a small amount of food. Even though fats should be limited due to their high calorie content, not all fats are bad. In fact, some fats are quite healthful. Fat can be broken down into four main types.    The good-for-you fats are:   Monounsaturated fat  found in oils such as olive and canola, avocados, and nuts and natural nut butters; can decrease cholesterol levels, while keeping levels of HDL cholesterol high   Polyunsaturated fat  found in oils such as safflower, sunflower, soybean, corn, and sesame; can decrease total cholesterol and LDL cholesterol   Omega-3 fatty acids  particularly those found in fatty fish (such as salmon, trout, tuna, mackerel, herring, and sardines); can decrease risk of arrhythmias, decrease triglyceride levels, and slightly lower blood pressure   The fats that you want to limit are:   Saturated fat  found in animal products, many fast foods, and a few vegetables; increases total blood cholesterol, including LDL levels   Animal fats that are saturated include: butter, lard, whole-milk dairy products, meat fat, and poultry skin   Vegetable fats that are saturated include: hydrogenated shortening, palm oil, coconut oil, cocoa butter   Hydrogenated or trans fat  found in margarine and vegetable shortening, most shelf stable snack foods, and fried foods; increases LDL and decreases HDL     It is generally recommended that you limit your total fat for the day to less than 30% of your total calories. If you follow an 1800-calorie heart healthy diet, for example, this would mean 60 grams of fat or less per day. Saturated fat and trans fat in your diet raises your blood cholesterol the most, much more than dietary cholesterol does. For this reason, on a heart-healthy diet, less than 7% of your calories should come from saturated fat and ideally 0% from trans fat. On an 1800-calorie diet, this translates into less than 14 grams of saturated fat per day, leaving 46 grams of fat to come from mono- and polyunsaturated fats.    Food Choices on a Heart Healthy Diet   Food Category   Foods Recommended   Foods to Avoid   Grains   Breads and rolls without salted tops Most dry and cooked cereals Unsalted crackers and breadsticks Low-sodium or homemade breadcrumbs or stuffing All rice and pastas   Breads, rolls, and crackers with salted tops High-fat baked goods (eg, muffins, donuts, pastries) Quick breads, self-rising flour, and biscuit mixes Regular bread crumbs Instant hot cereals Commercially prepared rice, pasta, or stuffing mixes   Vegetables   Most fresh, frozen, and low-sodium canned vegetables Low-sodium and salt-free vegetable juices Canned vegetables if unsalted or rinsed   Regular canned vegetables and juices, including sauerkraut and pickled vegetables Frozen vegetables with sauces Commercially prepared potato and vegetable mixes   Fruits   Most fresh, frozen, and canned fruits All fruit juices   Fruits processed with salt or sodium   Milk   Nonfat or low-fat (1%) milk Nonfat or low-fat yogurt Cottage cheese, low-fat ricotta, cheeses labeled as low-fat and low-sodium   Whole milk Reduced-fat (2%) milk Malted and chocolate milk Full fat yogurt Most cheeses (unless low-fat and low salt) Buttermilk (no more than 1 cup per week)   Meats and Beans   Lean cuts of fresh or frozen beef, veal, lamb, or pork (look for the word loin) Fresh or frozen poultry without the skin Fresh or frozen fish and some shellfish Egg whites and egg substitutes (Limit whole eggs to three per week) Tofu Nuts or seeds (unsalted, dry-roasted), low-sodium peanut butter Dried peas, beans, and lentils   Any smoked, cured, salted, or canned meat, fish, or poultry (including ceballos, chipped beef, cold cuts, hot dogs, sausages, sardines, and anchovies) Poultry skins Breaded and/or fried fish or meats Canned peas, beans, and lentils Salted nuts   Fats and Oils   Olive oil and canola oil Low-sodium, low-fat salad dressings and mayonnaise   Butter, margarine, coconut and palm oils, ceballos fat   Snacks, Sweets, and Condiments   Low-sodium or unsalted versions of broths, soups, soy sauce, and condiments Pepper, herbs, and spices; vinegar, lemon, or lime juice Low-fat frozen desserts (yogurt, sherbet, fruit bars) Sugar, cocoa powder, honey, syrup, jam, and preserves Low-fat, trans-fat free cookies, cakes, and pies Sourav and animal crackers, fig bars, ward snaps   High-fat desserts Broth, soups, gravies, and sauces, made from instant mixes or other high-sodium ingredients Salted snack foods Canned olives Meat tenderizers, seasoning salt, and most flavored vinegars   Beverages   Low-sodium carbonated beverages Tea and coffee in moderation Soy milk   Commercially softened water Suggestions   Make whole grains, fruits, and vegetables the base of your diet. Choose heart-healthy fats such as canola, olive, and flaxseed oil, and foods high in heart-healthy fats, such as nuts, seeds, soybeans, tofu, and fish. Eat fish at least twice per week; the fish highest in omega-3 fatty acids and lowest in mercury include salmon, herring, mackerel, sardines, and canned chunk light tuna. If you eat fish less than twice per week or have high triglycerides, talk to your doctor about taking fish oil supplements. Read food labels. For products low in fat and cholesterol, look for fat free, low-fat, cholesterol free, saturated fat free, and trans fat freeAlso scan the Nutrition Facts Label, which lists saturated fat, trans fat, and cholesterol amounts. For products low in sodium, look for sodium free, very low sodium, low sodium, no added salt, and unsalted   Skip the salt when cooking or at the table; if food needs more flavor, get creative and try out different herbs and spices. Garlic and onion also add substantial flavor to foods. Trim any visible fat off meat and poultry before cooking, and drain the fat off after raygoza. Use cooking methods that require little or no added fat, such as grilling, boiling, baking, poaching, broiling, roasting, steaming, stir-frying, and sauting. Avoid fast food and convenience food. They tend to be high in saturated and trans fat and have a lot of added salt. Talk to a registered dietitian for individualized diet advice. Last Reviewed: March 2011 Iker Conn MS, MPH, RD   Updated: 3/29/2011       Preventing Osteoporosis: After Your Visit  Your Care Instructions  Osteoporosis means the bones are weak and thin enough that they can break easily. The older you are, the more likely you are to get osteoporosis. But with plenty of calcium, vitamin D, and exercise, you can help prevent osteoporosis.   The preteen and teen years are a nagy time for time. Adults ages 23 to 48 should not get more than 2,500 mg of calcium and 4,000 IU of vitamin D each day, whether it is from supplements and/or food. Adults ages 46 and older should not get more than 2,000 mg of calcium and 4,000 IU of vitamin D each day from supplements and/or food. Get regular bone-building exercise. Weight-bearing and resistance exercises keep bones healthy by working the muscles and bones against gravity. Start out at an exercise level that feels right for you. Add a little at a time until you can do the following:  Do 30 minutes of weight-bearing exercise on most days of the week. Walking, jogging, stair climbing, and dancing are good choices. Do resistance exercises with weights or elastic bands 2 to 3 days a week. Limit alcohol. Drink no more than 1 alcohol drink a day if you are a woman. Drink no more than 2 alcohol drinks a day if you are a man. Do not smoke. Smoking can make bones thin faster. If you need help quitting, talk to your doctor about stop-smoking programs and medicines. These can increase your chances of quitting for good. When should you call for help? Watch closely for changes in your health, and be sure to contact your doctor if:  You need help with a healthy eating plan. You need help with an exercise plan    © 3938-9340 [employer]. Care instructions adapted under license by [facility]. This care instruction is for use with your licensed healthcare professional. If you have questions about a medical condition or this instruction, always ask your healthcare professional. [place] [address] any warranty or liability for your use of this information. Content Version: 9.4.54707; Last Revised: June 20, 2011                Keep Your Memory Rosita Moment       Many factors can affect your ability to remembera hectic lifestyle, aging, stress, chronic disease, and certain medicines.  But, there are steps you can take to sharpen your mind have investigated a range of natural remedies, such as ginkgo , ginseng , and the supplement phosphatidylserine (PS). So far, though, the evidence is inconsistent as to whether these products can improve memory or thinking. If you are interested in taking herbs and supplements, talk to your doctor first because they may interact with other medicines that you are taking. Exercise Regularly    Among the many benefits of regular exercise are increased blood flow to the brain and decreased risk of certain diseases that can interfere with memory function. One study found that even moderate exercise has a beneficial effect. Examples of \"moderate\" exercise include:   Playing 18 holes of golf once a week, without a cart   Playing tennis twice a week   Walking one mile per day   Manage Stress    It can be tough to remember what is important when your mind is cluttered. Make time for relaxation. Choose activities that calm you down, and make it routine. Manage Chronic Conditions    Side effects of high blood pressure , diabetes, and heart disease can interfere with mental function. Many of the lifestyle steps discussed here can help manage these conditions. Strive to eat a healthy diet, exercise regularly, get stress under control, and follow your doctor's advice for your condition. Minimize Medications    Talk to your doctor about the medicines that you take. Some may be unnecessary. Also, healthy lifestyle habits may lower the need for certain drugs. Last Reviewed: April 2010 Yehuda Kapadia MD   Updated: 4/13/2010     Keeping Home a [facility]       As we get older, changes in balance, gait, strength, vision, hearing, and cognition make even the most youthful senior more prone to accidents. Falls are one of the leading health risks for older people.  This increased risk of falling is related to:   Aging process (eg, decreased muscle strength, slowed reflexes)   Higher incidence of chronic health problems (eg, arthritis, diabetes) that may limit mobility, agility or sensory awareness   Side effects of medicine (eg, dizziness, blurred vision)especially medicines like prescription pain medicines and drugs used to treat mental health conditions   Depending on the brittleness of your bones, the consequences of a fall can be serious and long lasting. Home Life   Research by the Association of Aging [facility]) shows that some home accidents among older adults can be prevented by making simple lifestyle changes and basic modifications and repairs to the home environment. Here are some lifestyle changes that experts recommend:   Have your hearing and vision checked regularly. Be sure to wear prescription glasses that are right for you. Speak to your doctor or pharmacist about the possible side effects of your medicines. A number of medicines can cause dizziness. If you have problems with sleep, talk to your doctor. Limit your intake of alcohol. If necessary, use a cane or walker to help maintain your balance. Wear supportive, rubber-soled shoes, even at home. If you live in a region that gets wintry weather, you may want to put special cleats on your shoes to prevent you from slipping on the snow and ice. Exercise regularly to help maintain muscle tone, agility, and balance. Always hold the banister when going up or down stairs. Also, use  bars when getting in or out of the bath or shower, or using the toilet. To avoid dizziness, get up slowly from a lying down position. Sit up first, dangling your legs for a minute or two before rising to a standing position. Overall Home Safety Check   According to the Consumer [employer] Safety Commision's \"Older Consumer Home Safety Checklist,\" it is important to check for potential hazards in each room. And remember, proper lighting is an essential factor in home safety. If you cannot see clearly, you are more likely to fall.    Important questions to ask yourself include: Are lamp, electric, extension, and telephone cords placed out of the flow of traffic and maintained in good condition? Have frayed cords been replaced? Are all small rugs and runners slip resistant? If not, you can secure them to the floor with a special double-sided carpet tape. Are smoke detectors properly locatedone on every floor of your home and one outside of every sleeping area? Are they in good working order? Are batteries replaced at least once a year? Do you have a well-maintained carbon monoxide detector outside every sleeping are in your home? Does your furniture layout leave plenty of space to maneuver between and around chairs, tables, beds, and sofas? Are hallways, stairs and passages between rooms well lit? Can you reach a lamp without getting out of bed? Are floor surfaces well maintained? Shag rugs, high-pile carpeting, tile floors, and polished wood floors can be particularly slippery. Stairs should always have handrails and be carpeted or fitted with a non-skid tread. Is your telephone easily reachable. Is the cord safely tucked away? Room by Room   According to the Association of Aging, bathrooms and daksha are the two most potentially hazardous rooms in your home. In the Kitchen    Be sure your stove is in proper working order and always make sure burners and the oven are off before you go out or go to sleep. Keep pots on the back burners, turn handles away from the front of the stove, and keep stove clean and free of grease build-up. Kitchen ventilation systems and range exhausts should be working properly. Keep flammable objects such as towels and pot holders away from the cooking area except when in use. Make sure kitchen curtains are tied back. Move cords and appliances away from the sink and hot surfaces. If extension cords are needed, install wiring guides so they do not hang over the sink, range, or working areas.     Look for coffee pots, kettles and toaster ovens with automatic shut-offs. Keep a mop handy in the kitchen so you can wipe up spills instantly. You should also have a small fire extinguisher. Arrange your kitchen with frequently used items on lower shelves to avoid the need to stand on a stepstool to reach them. Make sure countertops are well-lit to avoid injuries while cutting and preparing food. In the Bathroom    Use a non-slip mat or decals in the tub and shower, since wet, soapy tile or porcelain surfaces are extremely slippery. Make sure bathroom rugs are non-skid or tape them firmly to the floor. Bathtubs should have at least one, preferably two, grab bars, firmly attached to structural supports in the wall. (Do not use built-in soap holders or glass shower doors as grab bars.)    Tub seats fitted with non-slip material on the legs allow you to wash sitting down. For people with limited mobility, bathtub transfer benches allow you to slide safely into the tub. Raised toilet seats and toilet safety rails are helpful for those with knee or hip problems. In the [facility]    Make sure you use a nightlight and that the area around your bed is clear of potential obstacles. Be careful with electric blankets and never go to sleep with a heating pad, which can cause serious burns even if on a low setting. Use fire-resistant mattress covers and pillows, and NEVER smoke in bed. Keep a phone next to the bed that is programmed to dial 911 at the push of a button. If you have a chronic condition, you may want to sign on with an automatic call-in service. Typically the system includes a small pendant that connects directly to an emergency medical voice-response system. You should also make arrangements to stay in contact with someonefriend, neighbor, family memberon a regular schedule.    Fire Prevention   According to the [employer]. (Smoke Alarms for Every) Cr senior citizens are one of the two highest risk groups for death and serious injuries due to residential fires. When cooking, wear short-sleeved items, never a bulky long-sleeved robe. The [facility]'s Safety Checklist for Older Consumers emphasizes the importance of checking basements, garages, workshops and storage areas for fire hazards, such as volatile liquids, piles of old rags or clothing and overloaded circuits. Never smoke in bed or when lying down on a couch or recliner chair. Small portable electric or kerosene heaters are responsible for many home fires and should be used cautiously if at all. If you do use one, be sure to keep them away from flammable materials. In case of fire, make sure you have a pre-established emergency exit plan. Have a professional check your fireplace and other fuel-burning appliances yearly. [employer]   Baby boomers entering the [place] years will continue to see the development of new products to help older adults live safely and independently in spite of age-related changes. Making Life More Livable  , by Yared Carlton, lists over 1,000 products for \"living well in the mature years,\" such as bathing and mobility aids, household security devices, ergonomically designed knives and peelers, and faucet valves and knobs for temperature control. Medical supply stores and organizations are good sources of information about products that improve your quality of life and insure your safety.      Last Reviewed: November 2009 Yehuda Kapadia MD   Updated: 3/7/2011

## 2022-07-15 NOTE — PROGRESS NOTES
Medicare Annual Wellness Visit    Yesenia Urbina is here for Medicare AWV    Assessment & Plan   Bilateral primary osteoarthritis of knee  The following orders have not been finalized:  -     diclofenac (VOLTAREN) 50 MG EC tablet  Encounter for medication refill  The following orders have not been finalized:  -     lisinopril (PRINIVIL;ZESTRIL) 10 MG tablet  -     diclofenac (VOLTAREN) 50 MG EC tablet  Essential hypertension  The following orders have not been finalized:  -     lisinopril (PRINIVIL;ZESTRIL) 10 MG tablet    Recommendations for Preventive Services Due: see orders and patient instructions/AVS.  Recommended screening schedule for the next 5-10 years is provided to the patient in written form: see Patient Instructions/AVS.     No follow-ups on file. Subjective   The following acute and/or chronic problems were also addressed today:    Hyperlipidemia:    Lab Results   Component Value Date    CHOL 218 (H) 07/06/2022    CHOL 229 (H) 06/30/2021    CHOL 193 08/17/2020     Lab Results   Component Value Date    TRIG 155 (H) 07/06/2022    TRIG 108 06/30/2021    TRIG 119 08/17/2020     Lab Results   Component Value Date    HDL 61 07/06/2022    HDL 71 06/30/2021    HDL 58 08/17/2020     Lab Results   Component Value Date    LDLCALC 126 (H) 07/06/2022    LDLCALC 136 (H) 06/30/2021    LDLCALC 111 (H) 08/17/2020     The 10-year ASCVD risk score (Eugenio Truong et al., 2013) is: 8.6%    Values used to calculate the score:      Age: 78 years      Sex: Female      Is Non- : No      Diabetic: No      Tobacco smoker: No      Systolic Blood Pressure: 553 mmHg      Is BP treated: Yes      HDL Cholesterol: 61 mg/dL      Total Cholesterol: 218 mg/dL     Discussion and shared decision making: plan is to optimize lifestyle (ie diet) and continue to monitor. No medication management at this time.     Patient's complete Health Risk Assessment and screening values have been reviewed and are found in [facility]. The following problems were reviewed today and where indicated follow up appointments were made and/or referrals ordered.     Positive Risk Factor Screenings with Interventions:    Fall Risk:  Do you feel unsteady or are you worried about falling? : (!) yes  2 or more falls in past year?: no  Fall with injury in past year?: (!) yes   Fall Risk Interventions:    Home safety tips provided  Home exercises provided to promote strength and balance            General Health and ACP:  General  In general, how would you say your health is?: Good  In the past 7 days, have you experienced any of the following: New or Increased Pain, New or Increased Fatigue, Loneliness, Social Isolation, Stress or Anger?: (!) Yes  Select all that apply: (!) New or Increased Pain  Do you get the social and emotional support that you need?: Yes  Do you have a Living Will?: Yes    Advance Directives       Power of  Living Will ACP-Advance Directive ACP-Power of     Not on File Filed on 09/05/12 Filed Not on File        General Health Risk Interventions:  Pain issues: home exercises provided, Orthopedic referral ordered for evaluation/treatment of persistent/worsening pain of right knee    Health Habits/Nutrition:  Physical Activity: Unknown    Days of Exercise per Week: Patient refused    Minutes of Exercise per Session: Not on file     Have you lost any weight without trying in the past 3 months?: No  Body mass index: (!) 30.95  Have you seen the dentist within the past year?: Yes  Health Habits/Nutrition Interventions:  Inadequate physical activity:  patient agrees to join a structured exercise program- look into [facility] program for additional stretching and balance    Hearing/Vision:  Do you or your family notice any trouble with your hearing that hasn't been managed with hearing aids?: No  Do you have difficulty driving, watching TV, or doing any of your daily activities because of your eyesight?: No  Have you had an eye exam within the past year?: (!) No  No results found. Hearing/Vision Interventions:  Vision concerns:  patient encouraged to make appointment with his/her eye specialist            Objective   Vitals:    07/15/22 0811   BP: 130/76   Pulse: 69   Resp: 18   Temp: 98 °F (36.7 °C)   TempSrc: Infrared   SpO2: 97%   Weight: 186 lb (84.4 kg)   Height: 5' 5\" (1.651 m)      Body mass index is 30.95 kg/m². No Known Allergies  Prior to Visit Medications    Medication Sig Taking? Authorizing Provider   magnesium 200 MG TABS tablet Take 200 mg by mouth in the morning. Yes Historical Provider, MD   melatonin 10 MG CAPS capsule Take 10 mg by mouth nightly Yes Historical Provider, MD   anastrozole (ARIMIDEX) 1 MG tablet Take 1 tablet by mouth daily Yes Linda Mercado, APRN - CNP   PLENVU 140 g SOLR  Yes Historical Provider, MD   Cyanocobalamin (B-12) 2500 MCG TABS Take by mouth Yes Historical Provider, MD   denosumab (PROLIA) 60 MG/ML SOSY SC injection Inject 60 mg into the skin once Yes Historical Provider, MD   lisinopril (PRINIVIL;ZESTRIL) 10 MG tablet Indications: High Blood Pressure Disorder TAKE 1 TABLET DAILY Yes Sri Gage MD   Omega-3 Fatty Acids (OMEGA-3 FISH OIL) 500 MG CAPS Take by mouth daily chewables Yes Historical Provider, MD   Probiotic Product (PROBIOTIC-10) CAPS Take by mouth daily Yes Historical Provider, MD   vitamin C (ASCORBIC ACID) 500 MG tablet Take 500 mg by mouth daily Yes Historical Provider, MD   Cholecalciferol (VITAMIN D3) 2000 units CAPS Take 2,000 Units by mouth daily  Yes Historical Provider, MD   Calcium Carb-Cholecalciferol (CALCIUM 1000 + D) 1000-800 MG-UNIT TABS Take 1 tablet by mouth daily  Patient taking differently: Take 1 tablet by mouth daily 1300 mg chews. Yes Sybil Stanton MD   b complex vitamins capsule Take 1 capsule by mouth daily.  Yes Historical Provider, MD   diclofenac (VOLTAREN) 50 MG EC tablet Take 1 tablet by mouth 2 times daily  Lizzie Mcgregor MD   Phenylephrine-Acetaminophen 5-325 MG TABS Take  by mouth as needed. Patient not taking: Reported on 7/15/2022  Historical Provider, MD [place] (Including outside providers/suppliers regularly involved in providing care):   Patient Care Team:  Lizzie Mcgregor MD as PCP - General (Family Medicine)  Lizzie Mcgregor MD as PCP - [facility] Empaneled Provider     Reviewed and updated this visit:  Tobacco  Allergies  Meds  Med Hx  Surg Hx  Soc Hx  Fam Hx          Assessment / Plan:      Saleem Starr was seen today for medicare awv. Diagnoses and all orders for this visit:    Initial Medicare annual wellness visit    At high risk for falls  -     External Referral To Orthopedic Surgery    Encounter for medication refill  -     diclofenac (VOLTAREN) 50 MG EC tablet; Take 1 tablet by mouth in the morning and 1 tablet before bedtime.  -     lisinopril (PRINIVIL;ZESTRIL) 10 MG tablet; Indications: High Blood Pressure Disorder TAKE 1 TABLET DAILY    Bilateral primary osteoarthritis of knee  -     diclofenac (VOLTAREN) 50 MG EC tablet; Take 1 tablet by mouth in the morning and 1 tablet before bedtime. Essential hypertension  -     lisinopril (PRINIVIL;ZESTRIL) 10 MG tablet; Indications: High Blood Pressure Disorder TAKE 1 TABLET DAILY    Chronic pain of right knee  -     External Referral To Orthopedic Surgery       Follow Up:  Return for Medicare Annual Wellness Visit in 1 year. Lizzie Mcgregor MD    Cardiovascular Disease Risk Counseling: Assessed the patient's risk to develop cardiovascular disease and reviewed main risk factors.    Reviewed steps to reduce disease risk including:   Quitting tobacco use, reducing amount smoked, or not starting the habit  Making healthy food choices  Being physically active and gradualy increasing activity levels   Reduce weight and determine a healthy BMI goal  Monitor blood pressure and treat if higher than 140/90 mmHg  Maintain blood total cholesterol levels under 5 mmol/l or 190 mg/dl  Maintain LDL cholesterol levels under 3.0 mmol/l or 115 mg/dl   Control blood glucose levels  Consider taking aspirin (75 mg daily), once blood pressure is controlled   Provided a follow up plan.   Time spent (minutes): 7 minutes

## 2022-08-02 DIAGNOSIS — Z85.3 PERSONAL HISTORY OF BREAST CANCER: Primary | ICD-10-CM

## 2022-08-04 ENCOUNTER — HOSPITAL ENCOUNTER (OUTPATIENT)
Dept: INFUSION THERAPY | Age: 67
Discharge: HOME | End: 2022-08-04
Payer: MEDICARE

## 2022-08-04 ENCOUNTER — OFFICE VISIT (OUTPATIENT)
Dept: ONCOLOGY | Age: 67
End: 2022-08-04
Payer: MEDICARE

## 2022-08-04 VITALS
WEIGHT: 185.3 LBS | HEART RATE: 67 BPM | RESPIRATION RATE: 18 BRPM | TEMPERATURE: 97.3 F | BODY MASS INDEX: 30.84 KG/M2 | OXYGEN SATURATION: 98 % | SYSTOLIC BLOOD PRESSURE: 184 MMHG | DIASTOLIC BLOOD PRESSURE: 81 MMHG

## 2022-08-04 DIAGNOSIS — Z85.3 PERSONAL HISTORY OF BREAST CANCER: Primary | ICD-10-CM

## 2022-08-04 DIAGNOSIS — Z17.0 MALIGNANT NEOPLASM OF UPPER-OUTER QUADRANT OF LEFT BREAST IN FEMALE, ESTROGEN RECEPTOR POSITIVE (HCC): ICD-10-CM

## 2022-08-04 DIAGNOSIS — C50.412 MALIGNANT NEOPLASM OF UPPER-OUTER QUADRANT OF LEFT BREAST IN FEMALE, ESTROGEN RECEPTOR POSITIVE (HCC): ICD-10-CM

## 2022-08-04 LAB
ALBUMIN SERPL-MCNC: 4.4 G/DL (ref 3.5–5.2)
ALP BLD-CCNC: 106 U/L (ref 35–104)
ALT SERPL-CCNC: 26 U/L (ref 0–32)
ANION GAP SERPL CALCULATED.3IONS-SCNC: 9 MMOL/L (ref 7–16)
AST SERPL-CCNC: 21 U/L (ref 0–31)
BASOPHILS ABSOLUTE: 0.04 E9/L (ref 0–0.2)
BASOPHILS RELATIVE PERCENT: 0.8 % (ref 0–2)
BILIRUB SERPL-MCNC: 0.4 MG/DL (ref 0–1.2)
BUN BLDV-MCNC: 14 MG/DL (ref 6–23)
CALCIUM SERPL-MCNC: 9.5 MG/DL (ref 8.6–10.2)
CHLORIDE BLD-SCNC: 106 MMOL/L (ref 98–107)
CO2: 27 MMOL/L (ref 22–29)
CREAT SERPL-MCNC: 0.6 MG/DL (ref 0.5–1)
EOSINOPHILS ABSOLUTE: 0.13 E9/L (ref 0.05–0.5)
EOSINOPHILS RELATIVE PERCENT: 2.4 % (ref 0–6)
GFR AFRICAN AMERICAN: >60
GFR NON-AFRICAN AMERICAN: >60 ML/MIN/1.73
GLUCOSE BLD-MCNC: 104 MG/DL (ref 74–99)
HCT VFR BLD CALC: 39.9 % (ref 34–48)
HEMOGLOBIN: 13.4 G/DL (ref 11.5–15.5)
IMMATURE GRANULOCYTES #: 0.02 E9/L
IMMATURE GRANULOCYTES %: 0.4 % (ref 0–5)
LYMPHOCYTES ABSOLUTE: 1.02 E9/L (ref 1.5–4)
LYMPHOCYTES RELATIVE PERCENT: 19.2 % (ref 20–42)
MCH RBC QN AUTO: 30.7 PG (ref 26–35)
MCHC RBC AUTO-ENTMCNC: 33.6 % (ref 32–34.5)
MCV RBC AUTO: 91.3 FL (ref 80–99.9)
MONOCYTES ABSOLUTE: 0.44 E9/L (ref 0.1–0.95)
MONOCYTES RELATIVE PERCENT: 8.3 % (ref 2–12)
NEUTROPHILS ABSOLUTE: 3.66 E9/L (ref 1.8–7.3)
NEUTROPHILS RELATIVE PERCENT: 68.9 % (ref 43–80)
PDW BLD-RTO: 13 FL (ref 11.5–15)
PLATELET # BLD: 251 E9/L (ref 130–450)
PMV BLD AUTO: 10.7 FL (ref 7–12)
POTASSIUM SERPL-SCNC: 4.6 MMOL/L (ref 3.5–5)
RBC # BLD: 4.37 E12/L (ref 3.5–5.5)
SODIUM BLD-SCNC: 142 MMOL/L (ref 132–146)
TOTAL PROTEIN: 7.5 G/DL (ref 6.4–8.3)
WBC # BLD: 5.3 E9/L (ref 4.5–11.5)

## 2022-08-04 PROCEDURE — 1123F ACP DISCUSS/DSCN MKR DOCD: CPT | Performed by: INTERNAL MEDICINE

## 2022-08-04 PROCEDURE — 99212 OFFICE O/P EST SF 10 MIN: CPT

## 2022-08-04 PROCEDURE — 6360000002 HC RX W HCPCS: Performed by: INTERNAL MEDICINE

## 2022-08-04 PROCEDURE — 99215 OFFICE O/P EST HI 40 MIN: CPT | Performed by: INTERNAL MEDICINE

## 2022-08-04 PROCEDURE — 80053 COMPREHEN METABOLIC PANEL: CPT

## 2022-08-04 PROCEDURE — 96372 THER/PROPH/DIAG INJ SC/IM: CPT

## 2022-08-04 PROCEDURE — 85025 COMPLETE CBC W/AUTO DIFF WBC: CPT

## 2022-08-04 RX ORDER — ANASTROZOLE 1 MG/1
1 TABLET ORAL DAILY
Qty: 90 TABLET | Refills: 2 | Status: SHIPPED | OUTPATIENT
Start: 2022-08-04

## 2022-08-04 RX ADMIN — DENOSUMAB 60 MG: 60 INJECTION SUBCUTANEOUS at 10:57

## 2022-08-04 NOTE — PROGRESS NOTES
Department of [facility] Oncology      Attending Clinic Note    Reason for Visit:  Follow-up on a patient with Left Breast Cancer    PCP:  Hunter Joseph MD    History of Present Illness:  78 y.o. female status post Left simple mastectomy/SLNB and right prophylactic mastectomy on 04/09/2018. Left multifocal ER >90%/MD 90%, HER2 negative (0). 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 pT2 pN1a    Right breast [facility]    4/20/18 s/p Left ALND  Left axilla, lymph node dissection - One of three lymph nodes, positive for metastatic carcinoma (1/3). - Size of largest metastatic deposit: 3.5 mm.   - No extracapsular extension identified. - Fibroadipose tissue and skeletal muscle with changes consistent with prior surgery site    The final pathologic stage is mpT2 pN1a. Stage IIB: (T2, N1, M0)  S/p adjuvant chemotherapy Dose dense AC x4, followed by Taxol x 4 completed 08/28/2018. RT was completed on 11/15/2018. Baseline DEXA 10/18 osteopenia. Currently on Arimidex since Nov 2018. Today 08/04/2022. No fever, chills. Fair appetite and energy level. Tolerating Arimidex well except for mild arthralgias    Review of Systems;  CONSTITUTIONAL: No fever, chills. Fair appetite and energy level. ENMT: Eyes: No diplopia; Nose:No epistaxis. Mouth: No sore throat. RESPIRATORY: No hemoptysis, shortness of breath. CARDIOVASCULAR: No chest pain, palpitations. GASTROINTESTINAL: No nausea/vomiting, abdominal pain. MSK: Mild arthralgias  GENITOURINARY: No dysuria, urinary frequency, hematuria. NEURO: No syncope, presyncope, headache.   Remainder:  ROS NEGATIVE    Past Medical History:      Diagnosis Date    Breast cancer ([facility] [address] 03/2018    Cancer ([facility] [address] Hypertension     Migraine headache 3/2/2015    Obesity (BMI 30.0-34.9) 3/20/2017     Medications:  Reviewed and reconciled. Allergies:  No Known Allergies    Physical Exam:  BP (!) 184/81 (Site: Left Upper Arm, Position: Sitting)   Pulse 67   Temp 97.3 °F (36.3 °C) (Temporal)   Resp 18   Wt 185 lb 4.8 oz (84.1 kg)   SpO2 98%   BMI 30.84 kg/m²   GENERAL: Alert, oriented x 3, not in acute distress. HEENT: PERRLA; EOMI. Oropharynx clear. BREASTS: Incisions intact. No palpable masses or LN. EXTREMITIES: Without clubbing, cyanosis, or edema. NEUROLOGIC: No focal deficits. ECOG PS 1    Lab Results   Component Value Date    WBC 5.3 08/04/2022    HGB 13.4 08/04/2022    HCT 39.9 08/04/2022    MCV 91.3 08/04/2022     08/04/2022     Lab Results   Component Value Date     08/04/2022    K 4.6 08/04/2022     08/04/2022    CO2 27 08/04/2022    BUN 14 08/04/2022    CREATININE 0.6 08/04/2022    GLUCOSE 104 (H) 08/04/2022    CALCIUM 9.5 08/04/2022    PROT 7.5 08/04/2022    LABALBU 4.4 08/04/2022    BILITOT 0.4 08/04/2022    ALKPHOS 106 (H) 08/04/2022    AST 21 08/04/2022    ALT 26 08/04/2022    LABGLOM >60 08/04/2022    GFRAA >60 08/04/2022     Impression/Plan:  78 y.o. female status post Left simple mastectomy/SLNB and right prophylactic mastectomy on 04/09/2018. Left multifocal ER >90%/MA 90%, HER2 negative (0). 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 pT2 pN1a    Right breast [facility]    4/20/18 s/p Left ALND  Left axilla, lymph node dissection - One of three lymph nodes, positive for metastatic carcinoma (1/3). - Size of largest metastatic deposit: 3.5 mm.   - No extracapsular extension identified.    - Fibroadipose tissue and skeletal muscle with changes consistent with prior surgery site    The final pathologic stage mpT2 pN1a. Stage IIB: (T2, N1, M0)  S/p adjuvant chemotherapy Dose dense AC x 4, followed by Taxol x 4 completed 08/28/2018. RT was completed on 11/15/2018. Baseline DEXA 10/18 osteopenia. Currently on Arimidex since Nov 2018    DEXA scan 01/23/2020: osteopenia in LS and rosalee hips. On Ca/VitD. DEXA scan 01/28/2021: Bone mineral density of the lumbar spine is osteoporotic and previously osteopenic. Osteopenia of bilateral femoral necks, similar to prior. On Ca. VitD. Prolia q6m  Left Ankle X-Ray 10/19/2021 No fracture or dislocation of the ankle  DEXA scan 02/03/2022 The bone mineral density of the lumbar spine and bilateral femoral necks is osteopenic. Improving bone mineral density compared to prior exam.  Bone mineral density of the lumbar spine is now osteopenic and was previously osteoporotic. Interval mildly improved bone density of bilateral femoral necks. Imaging reviewed. Labs reviewed. On Ca. VitD. Prolia q6m (one given today 08/04/2022)  KARELY  Continue Arimidex, Ca. VitD.  Prolia q6m    RTC 6 months with Prolia same day    Manny Garcia MD   4/0/7205  Board Certified Medical Oncologist

## 2023-02-02 ENCOUNTER — OFFICE VISIT (OUTPATIENT)
Dept: ONCOLOGY | Age: 68
End: 2023-02-02
Payer: MEDICARE

## 2023-02-02 ENCOUNTER — HOSPITAL ENCOUNTER (OUTPATIENT)
Dept: INFUSION THERAPY | Age: 68
Discharge: HOME | End: 2023-02-02
Payer: MEDICARE

## 2023-02-02 VITALS
WEIGHT: 189 LBS | OXYGEN SATURATION: 97 % | SYSTOLIC BLOOD PRESSURE: 183 MMHG | BODY MASS INDEX: 31.49 KG/M2 | DIASTOLIC BLOOD PRESSURE: 81 MMHG | HEIGHT: 65 IN | TEMPERATURE: 97.9 F | RESPIRATION RATE: 16 BRPM | HEART RATE: 73 BPM

## 2023-02-02 DIAGNOSIS — C50.412 MALIGNANT NEOPLASM OF UPPER-OUTER QUADRANT OF LEFT BREAST IN FEMALE, ESTROGEN RECEPTOR POSITIVE (HCC): ICD-10-CM

## 2023-02-02 DIAGNOSIS — M81.0 OSTEOPOROSIS, UNSPECIFIED OSTEOPOROSIS TYPE, UNSPECIFIED PATHOLOGICAL FRACTURE PRESENCE: ICD-10-CM

## 2023-02-02 DIAGNOSIS — Z85.3 PERSONAL HISTORY OF BREAST CANCER: Primary | ICD-10-CM

## 2023-02-02 DIAGNOSIS — Z17.0 MALIGNANT NEOPLASM OF UPPER-OUTER QUADRANT OF LEFT BREAST IN FEMALE, ESTROGEN RECEPTOR POSITIVE (HCC): ICD-10-CM

## 2023-02-02 LAB
ALBUMIN SERPL-MCNC: 4.5 G/DL (ref 3.5–5.2)
ALP BLD-CCNC: 112 U/L (ref 35–104)
ALT SERPL-CCNC: 29 U/L (ref 0–32)
ANION GAP SERPL CALCULATED.3IONS-SCNC: 10 MMOL/L (ref 7–16)
AST SERPL-CCNC: 25 U/L (ref 0–31)
BASOPHILS ABSOLUTE: 0.05 E9/L (ref 0–0.2)
BASOPHILS RELATIVE PERCENT: 0.9 % (ref 0–2)
BILIRUB SERPL-MCNC: 0.4 MG/DL (ref 0–1.2)
BUN BLDV-MCNC: 15 MG/DL (ref 6–23)
CALCIUM SERPL-MCNC: 9.8 MG/DL (ref 8.6–10.2)
CHLORIDE BLD-SCNC: 105 MMOL/L (ref 98–107)
CO2: 27 MMOL/L (ref 22–29)
CREAT SERPL-MCNC: 0.6 MG/DL (ref 0.5–1)
EOSINOPHILS ABSOLUTE: 0.12 E9/L (ref 0.05–0.5)
EOSINOPHILS RELATIVE PERCENT: 2.1 % (ref 0–6)
GFR SERPL CREATININE-BSD FRML MDRD: >60 ML/MIN/1.73
GLUCOSE BLD-MCNC: 100 MG/DL (ref 74–99)
HCT VFR BLD CALC: 41.6 % (ref 34–48)
HEMOGLOBIN: 13.7 G/DL (ref 11.5–15.5)
IMMATURE GRANULOCYTES #: 0.04 E9/L
IMMATURE GRANULOCYTES %: 0.7 % (ref 0–5)
LYMPHOCYTES ABSOLUTE: 1.03 E9/L (ref 1.5–4)
LYMPHOCYTES RELATIVE PERCENT: 18.1 % (ref 20–42)
MCH RBC QN AUTO: 30.8 PG (ref 26–35)
MCHC RBC AUTO-ENTMCNC: 32.9 % (ref 32–34.5)
MCV RBC AUTO: 93.5 FL (ref 80–99.9)
MONOCYTES ABSOLUTE: 0.37 E9/L (ref 0.1–0.95)
MONOCYTES RELATIVE PERCENT: 6.5 % (ref 2–12)
NEUTROPHILS ABSOLUTE: 4.08 E9/L (ref 1.8–7.3)
NEUTROPHILS RELATIVE PERCENT: 71.7 % (ref 43–80)
PDW BLD-RTO: 12.7 FL (ref 11.5–15)
PLATELET # BLD: 237 E9/L (ref 130–450)
PMV BLD AUTO: 10.7 FL (ref 7–12)
POTASSIUM SERPL-SCNC: 4.2 MMOL/L (ref 3.5–5)
RBC # BLD: 4.45 E12/L (ref 3.5–5.5)
SODIUM BLD-SCNC: 142 MMOL/L (ref 132–146)
TOTAL PROTEIN: 7.4 G/DL (ref 6.4–8.3)
WBC # BLD: 5.7 E9/L (ref 4.5–11.5)

## 2023-02-02 PROCEDURE — 99213 OFFICE O/P EST LOW 20 MIN: CPT | Performed by: INTERNAL MEDICINE

## 2023-02-02 PROCEDURE — 3079F DIAST BP 80-89 MM HG: CPT | Performed by: INTERNAL MEDICINE

## 2023-02-02 PROCEDURE — 6360000002 HC RX W HCPCS: Performed by: INTERNAL MEDICINE

## 2023-02-02 PROCEDURE — 1123F ACP DISCUSS/DSCN MKR DOCD: CPT | Performed by: INTERNAL MEDICINE

## 2023-02-02 PROCEDURE — 96372 THER/PROPH/DIAG INJ SC/IM: CPT

## 2023-02-02 PROCEDURE — 80053 COMPREHEN METABOLIC PANEL: CPT

## 2023-02-02 PROCEDURE — 99212 OFFICE O/P EST SF 10 MIN: CPT

## 2023-02-02 PROCEDURE — 85025 COMPLETE CBC W/AUTO DIFF WBC: CPT

## 2023-02-02 PROCEDURE — 3077F SYST BP >= 140 MM HG: CPT | Performed by: INTERNAL MEDICINE

## 2023-02-02 RX ADMIN — DENOSUMAB 60 MG: 60 INJECTION SUBCUTANEOUS at 10:08

## 2023-02-02 NOTE — PROGRESS NOTES
Department of [facility] Oncology      Attending Clinic Note    Reason for Visit:  Follow-up on a patient with Left Breast Cancer    PCP:  Rik Castleman, MD    History of Present Illness:  78 y.o. female status post Left simple mastectomy/SLNB and right prophylactic mastectomy on 04/09/2018. Left multifocal ER >90%/ND 90%, HER2 negative (0). 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 pT2 pN1a    Right breast [facility]    4/20/18 s/p Left ALND  Left axilla, lymph node dissection - One of three lymph nodes, positive for metastatic carcinoma (1/3). - Size of largest metastatic deposit: 3.5 mm.   - No extracapsular extension identified. - Fibroadipose tissue and skeletal muscle with changes consistent with prior surgery site    The final pathologic stage is mpT2 pN1a. Stage IIB: (T2, N1, M0)  S/p adjuvant chemotherapy Dose dense AC x4, followed by Taxol x 4 completed 08/28/2018. RT was completed on 11/15/2018. Baseline DEXA 10/18 osteopenia. Currently on Arimidex since Nov 2018. Today 02/02/2023; No fever, chills. Fair appetite and energy level. Tolerating Arimidex well except for mild arthralgias. No chest pain or SOB. No N/V. Review of Systems;  CONSTITUTIONAL: No fever, chills. Fair appetite and energy level. ENMT: Eyes: No diplopia; Nose:No epistaxis. Mouth: No sore throat. RESPIRATORY: No hemoptysis, shortness of breath. CARDIOVASCULAR: No chest pain, palpitations. GASTROINTESTINAL: No nausea/vomiting, abdominal pain. MSK: Mild arthralgias  GENITOURINARY: No dysuria, urinary frequency, hematuria. NEURO: No syncope, presyncope, headache.   Remainder:  ROS NEGATIVE    Past Medical History:      Diagnosis Date    Breast cancer ([facility] [address] 03/2018 Cancer ([facility] [address]     Hypertension     Migraine headache 3/2/2015    Obesity (BMI 30.0-34.9) 3/20/2017     Medications:  Reviewed and reconciled. Allergies:  No Known Allergies    Physical Exam:  BP (!) 183/81 (Site: Right Upper Arm, Position: Sitting)   Pulse 73   Temp 97.9 °F (36.6 °C) (Infrared)   Resp 16   Ht 5' 5\" (1.651 m)   Wt 189 lb (85.7 kg)   SpO2 97%   BMI 31.45 kg/m²   GENERAL: Alert, oriented x 3, not in acute distress. HEENT: PERRLA; EOMI. Oropharynx clear. BREASTS: Incisions intact. No palpable masses or LN. EXTREMITIES: Without clubbing, cyanosis, or edema. NEUROLOGIC: No focal deficits. ECOG PS 1    Lab Results   Component Value Date    WBC 5.7 02/02/2023    HGB 13.7 02/02/2023    HCT 41.6 02/02/2023    MCV 93.5 02/02/2023     02/02/2023     Lab Results   Component Value Date     02/02/2023    K 4.2 02/02/2023     02/02/2023    CO2 27 02/02/2023    BUN 15 02/02/2023    CREATININE 0.6 02/02/2023    GLUCOSE 100 (H) 02/02/2023    CALCIUM 9.8 02/02/2023    PROT 7.4 02/02/2023    LABALBU 4.5 02/02/2023    BILITOT 0.4 02/02/2023    ALKPHOS 112 (H) 02/02/2023    AST 25 02/02/2023    ALT 29 02/02/2023    LABGLOM >60 02/02/2023    GFRAA >60 08/04/2022     Impression/Plan:  78 y.o. female status post Left simple mastectomy/SLNB and right prophylactic mastectomy on 04/09/2018. Left multifocal ER >90%/[place] 90%, HER2 negative (0). 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 pT2 pN1a    Right breast [facility]    4/20/18 s/p Left ALND  Left axilla, lymph node dissection - One of three lymph nodes, positive for metastatic carcinoma (1/3). - Size of largest metastatic deposit: 3.5 mm.   - No extracapsular extension identified.    - Fibroadipose tissue and skeletal muscle with changes consistent with prior surgery site    The final pathologic stage mpT2 pN1a. Stage IIB: (T2, N1, M0)  S/p adjuvant chemotherapy Dose dense AC x 4, followed by Taxol x 4 completed 08/28/2018. RT was completed on 11/15/2018. Baseline DEXA 10/18 osteopenia. Currently on Arimidex since Nov 2018    DEXA scan 01/23/2020: osteopenia in LS and rosalee hips. On Ca/VitD. DEXA scan 01/28/2021: Bone mineral density of the lumbar spine is osteoporotic and previously osteopenic. Osteopenia of bilateral femoral necks, similar to prior. On Ca. VitD. Prolia q6m  Left Ankle X-Ray 10/19/2021 No fracture or dislocation of the ankle  DEXA scan 02/03/2022 The bone mineral density of the lumbar spine and bilateral femoral necks is osteopenic. Improving bone mineral density compared to prior exam.  Bone mineral density of the lumbar spine is now osteopenic and was previously osteoporotic. Interval mildly improved bone density of bilateral femoral necks. On Ca. VitD. Prolia q6m   Labs reviewed  KARELY  Continue Arimidex, Ca,VitD.  Prolia q6m (labs reviewed; ok to proceed; one ordered to be given today 02/02/2023)  RTC 6 months with labs and Andi Bumpers, MD   7/2/3247  Board Certified Medical Oncologist

## 2023-04-17 ENCOUNTER — APPOINTMENT (OUTPATIENT)
Dept: ULTRASOUND IMAGING | Age: 68
End: 2023-04-17
Payer: MEDICARE

## 2023-04-17 ENCOUNTER — APPOINTMENT (OUTPATIENT)
Dept: GENERAL RADIOLOGY | Age: 68
End: 2023-04-17
Payer: MEDICARE

## 2023-04-17 ENCOUNTER — HOSPITAL ENCOUNTER (EMERGENCY)
Age: 68
Discharge: HOME | End: 2023-04-17
Attending: STUDENT IN AN ORGANIZED HEALTH CARE EDUCATION/TRAINING PROGRAM
Payer: MEDICARE

## 2023-04-17 VITALS
HEART RATE: 71 BPM | DIASTOLIC BLOOD PRESSURE: 82 MMHG | WEIGHT: 195.19 LBS | OXYGEN SATURATION: 99 % | SYSTOLIC BLOOD PRESSURE: 158 MMHG | RESPIRATION RATE: 16 BRPM | BODY MASS INDEX: 32.48 KG/M2 | TEMPERATURE: 98.9 F

## 2023-04-17 DIAGNOSIS — I10 ESSENTIAL HYPERTENSION: ICD-10-CM

## 2023-04-17 DIAGNOSIS — G62.9 NEUROPATHY: ICD-10-CM

## 2023-04-17 DIAGNOSIS — R60.0 BILATERAL LOWER EXTREMITY EDEMA: Primary | ICD-10-CM

## 2023-04-17 LAB
ALBUMIN SERPL-MCNC: 4.6 G/DL (ref 3.5–5.2)
ALP SERPL-CCNC: 110 U/L (ref 35–104)
ALT SERPL-CCNC: 28 U/L (ref 0–32)
ANION GAP SERPL CALCULATED.3IONS-SCNC: 11 MMOL/L (ref 7–16)
AST SERPL-CCNC: 22 U/L (ref 0–31)
BASOPHILS # BLD: 0.05 E9/L (ref 0–0.2)
BASOPHILS NFR BLD: 0.8 % (ref 0–2)
BILIRUB SERPL-MCNC: <0.2 MG/DL (ref 0–1.2)
BNP BLD-MCNC: 231 PG/ML (ref 0–125)
BUN SERPL-MCNC: 17 MG/DL (ref 6–23)
CALCIUM SERPL-MCNC: 10.3 MG/DL (ref 8.6–10.2)
CHLORIDE SERPL-SCNC: 104 MMOL/L (ref 98–107)
CK SERPL-CCNC: 206 U/L (ref 20–180)
CO2 SERPL-SCNC: 27 MMOL/L (ref 22–29)
CREAT SERPL-MCNC: 0.6 MG/DL (ref 0.5–1)
EKG ATRIAL RATE: 66 BPM
EKG P AXIS: 24 DEGREES
EKG P-R INTERVAL: 132 MS
EKG Q-T INTERVAL: 400 MS
EKG QRS DURATION: 84 MS
EKG QTC CALCULATION (BAZETT): 419 MS
EKG R AXIS: 54 DEGREES
EKG T AXIS: 56 DEGREES
EKG VENTRICULAR RATE: 66 BPM
EOSINOPHIL # BLD: 0.12 E9/L (ref 0.05–0.5)
EOSINOPHIL NFR BLD: 2 % (ref 0–6)
ERYTHROCYTE [DISTWIDTH] IN BLOOD BY AUTOMATED COUNT: 12.4 FL (ref 11.5–15)
GLUCOSE SERPL-MCNC: 101 MG/DL (ref 74–99)
HCT VFR BLD AUTO: 41.6 % (ref 34–48)
HGB BLD-MCNC: 13.9 G/DL (ref 11.5–15.5)
IMM GRANULOCYTES # BLD: 0.03 E9/L
IMM GRANULOCYTES NFR BLD: 0.5 % (ref 0–5)
LYMPHOCYTES # BLD: 0.96 E9/L (ref 1.5–4)
LYMPHOCYTES NFR BLD: 16.3 % (ref 20–42)
MCH RBC QN AUTO: 30.8 PG (ref 26–35)
MCHC RBC AUTO-ENTMCNC: 33.4 % (ref 32–34.5)
MCV RBC AUTO: 92 FL (ref 80–99.9)
MONOCYTES # BLD: 0.44 E9/L (ref 0.1–0.95)
MONOCYTES NFR BLD: 7.5 % (ref 2–12)
NEUTROPHILS # BLD: 4.29 E9/L (ref 1.8–7.3)
NEUTS SEG NFR BLD: 72.9 % (ref 43–80)
PLATELET # BLD AUTO: 265 E9/L (ref 130–450)
PMV BLD AUTO: 10.4 FL (ref 7–12)
POTASSIUM SERPL-SCNC: 4.1 MMOL/L (ref 3.5–5)
PROT SERPL-MCNC: 7.7 G/DL (ref 6.4–8.3)
RBC # BLD AUTO: 4.52 E12/L (ref 3.5–5.5)
SODIUM SERPL-SCNC: 142 MMOL/L (ref 132–146)
TROPONIN, HIGH SENSITIVITY: 20 NG/L (ref 0–9)
TROPONIN, HIGH SENSITIVITY: 21 NG/L (ref 0–9)
WBC # BLD: 5.9 E9/L (ref 4.5–11.5)

## 2023-04-17 PROCEDURE — 6360000002 HC RX W HCPCS: Performed by: STUDENT IN AN ORGANIZED HEALTH CARE EDUCATION/TRAINING PROGRAM

## 2023-04-17 PROCEDURE — 93970 EXTREMITY STUDY: CPT

## 2023-04-17 PROCEDURE — 80053 COMPREHEN METABOLIC PANEL: CPT

## 2023-04-17 PROCEDURE — 83880 ASSAY OF NATRIURETIC PEPTIDE: CPT

## 2023-04-17 PROCEDURE — 96374 THER/PROPH/DIAG INJ IV PUSH: CPT

## 2023-04-17 PROCEDURE — 84484 ASSAY OF TROPONIN QUANT: CPT

## 2023-04-17 PROCEDURE — 36415 COLL VENOUS BLD VENIPUNCTURE: CPT

## 2023-04-17 PROCEDURE — 71045 X-RAY EXAM CHEST 1 VIEW: CPT

## 2023-04-17 PROCEDURE — 82550 ASSAY OF CK (CPK): CPT

## 2023-04-17 PROCEDURE — 93005 ELECTROCARDIOGRAM TRACING: CPT | Performed by: STUDENT IN AN ORGANIZED HEALTH CARE EDUCATION/TRAINING PROGRAM

## 2023-04-17 PROCEDURE — 99285 EMERGENCY DEPT VISIT HI MDM: CPT

## 2023-04-17 PROCEDURE — 93010 ELECTROCARDIOGRAM REPORT: CPT | Performed by: INTERNAL MEDICINE

## 2023-04-17 PROCEDURE — 85025 COMPLETE CBC W/AUTO DIFF WBC: CPT

## 2023-04-17 RX ORDER — GABAPENTIN 100 MG/1
100 CAPSULE ORAL 3 TIMES DAILY
Qty: 15 CAPSULE | Refills: 0 | Status: SHIPPED | OUTPATIENT
Start: 2023-04-17 | End: 2023-04-22

## 2023-04-17 RX ORDER — HYDRALAZINE HYDROCHLORIDE 20 MG/ML
10 INJECTION INTRAMUSCULAR; INTRAVENOUS ONCE
Status: COMPLETED | OUTPATIENT
Start: 2023-04-17 | End: 2023-04-17

## 2023-04-17 RX ADMIN — HYDRALAZINE HYDROCHLORIDE 10 MG: 20 INJECTION INTRAMUSCULAR; INTRAVENOUS at 15:48

## 2023-04-17 ASSESSMENT — ENCOUNTER SYMPTOMS
DIARRHEA: 0
SHORTNESS OF BREATH: 0
CHEST TIGHTNESS: 0
VOMITING: 0
ABDOMINAL DISTENTION: 0
COUGH: 0
PHOTOPHOBIA: 0
ABDOMINAL PAIN: 0
NAUSEA: 0

## 2023-04-17 ASSESSMENT — PAIN SCALES - GENERAL: PAINLEVEL_OUTOF10: 6

## 2023-04-17 ASSESSMENT — PAIN DESCRIPTION - ORIENTATION: ORIENTATION: RIGHT;LEFT

## 2023-04-17 ASSESSMENT — PAIN - FUNCTIONAL ASSESSMENT: PAIN_FUNCTIONAL_ASSESSMENT: 0-10

## 2023-04-17 ASSESSMENT — PAIN DESCRIPTION - DESCRIPTORS: DESCRIPTORS: ACHING;SORE;TENDER;THROBBING

## 2023-04-17 ASSESSMENT — PAIN DESCRIPTION - LOCATION: LOCATION: ANKLE

## 2023-04-17 NOTE — ED PROVIDER NOTES
-------------------------------------------------  Labs:  Results for orders placed or performed during the hospital encounter of 04/17/23   CBC with Auto Differential   Result Value Ref Range    WBC 5.9 4.5 - 11.5 E9/L    RBC 4.52 3.50 - 5.50 E12/L    Hemoglobin 13.9 11.5 - 15.5 g/dL    Hematocrit 41.6 34.0 - 48.0 %    MCV 92.0 80.0 - 99.9 fL    MCH 30.8 26.0 - 35.0 pg    MCHC 33.4 32.0 - 34.5 %    RDW 12.4 11.5 - 15.0 fL    Platelets 318 254 - 296 E9/L    MPV 10.4 7.0 - 12.0 fL    Neutrophils % 72.9 43.0 - 80.0 %    Immature Granulocytes % 0.5 0.0 - 5.0 %    Lymphocytes % 16.3 (L) 20.0 - 42.0 %    Monocytes % 7.5 2.0 - 12.0 %    Eosinophils % 2.0 0.0 - 6.0 %    Basophils % 0.8 0.0 - 2.0 %    Neutrophils Absolute 4.29 1.80 - 7.30 E9/L    Immature Granulocytes # 0.03 E9/L    Lymphocytes Absolute 0.96 (L) 1.50 - 4.00 E9/L    Monocytes Absolute 0.44 0.10 - 0.95 E9/L    Eosinophils Absolute 0.12 0.05 - 0.50 E9/L    Basophils Absolute 0.05 0.00 - 0.20 E9/L   Comprehensive Metabolic Panel w/ Reflex to MG   Result Value Ref Range    Sodium 142 132 - 146 mmol/L    Potassium reflex Magnesium 4.1 3.5 - 5.0 mmol/L    Chloride 104 98 - 107 mmol/L    CO2 27 22 - 29 mmol/L    Anion Gap 11 7 - 16 mmol/L    Glucose 101 (H) 74 - 99 mg/dL    BUN 17 6 - 23 mg/dL    Creatinine 0.6 0.5 - 1.0 mg/dL    Est, Glom Filt Rate >60 >=60 mL/min/1.73    Calcium 10.3 (H) 8.6 - 10.2 mg/dL    Total Protein 7.7 6.4 - 8.3 g/dL    Albumin 4.6 3.5 - 5.2 g/dL    Total Bilirubin <0.2 0.0 - 1.2 mg/dL    Alkaline Phosphatase 110 (H) 35 - 104 U/L    ALT 28 0 - 32 U/L    AST 22 0 - 31 U/L   Troponin   Result Value Ref Range    Troponin, High Sensitivity 20 (H) 0 - 9 ng/L   Brain Natriuretic Peptide   Result Value Ref Range    Pro- (H) 0 - 125 pg/mL   CK   Result Value Ref Range    Total  (H) 20 - 180 U/L   Troponin   Result Value Ref Range    Troponin, High Sensitivity 21 (H) 0 - 9 ng/L   EKG 12 Lead   Result Value Ref Range    Ventricular Rate

## 2023-04-18 ENCOUNTER — OFFICE VISIT (OUTPATIENT)
Dept: FAMILY MEDICINE CLINIC | Age: 68
End: 2023-04-18
Payer: MEDICARE

## 2023-04-18 VITALS
HEIGHT: 65 IN | SYSTOLIC BLOOD PRESSURE: 138 MMHG | OXYGEN SATURATION: 96 % | DIASTOLIC BLOOD PRESSURE: 78 MMHG | HEART RATE: 76 BPM | BODY MASS INDEX: 31.75 KG/M2 | RESPIRATION RATE: 16 BRPM | WEIGHT: 190.6 LBS | TEMPERATURE: 97.9 F

## 2023-04-18 DIAGNOSIS — I73.00 RAYNAUD'S PHENOMENON WITHOUT GANGRENE: Primary | ICD-10-CM

## 2023-04-18 DIAGNOSIS — I10 PRIMARY HYPERTENSION: ICD-10-CM

## 2023-04-18 DIAGNOSIS — R60.0 BILATERAL LEG EDEMA: ICD-10-CM

## 2023-04-18 DIAGNOSIS — M19.90 ARTHRITIS: ICD-10-CM

## 2023-04-18 PROCEDURE — 1123F ACP DISCUSS/DSCN MKR DOCD: CPT | Performed by: FAMILY MEDICINE

## 2023-04-18 PROCEDURE — 3078F DIAST BP <80 MM HG: CPT | Performed by: FAMILY MEDICINE

## 2023-04-18 PROCEDURE — 99215 OFFICE O/P EST HI 40 MIN: CPT | Performed by: FAMILY MEDICINE

## 2023-04-18 PROCEDURE — 3075F SYST BP GE 130 - 139MM HG: CPT | Performed by: FAMILY MEDICINE

## 2023-04-18 RX ORDER — LISINOPRIL 20 MG/1
TABLET ORAL
Qty: 90 TABLET | Refills: 3 | Status: SHIPPED | OUTPATIENT
Start: 2023-04-18 | End: 2024-04-17

## 2023-04-18 SDOH — ECONOMIC STABILITY: HOUSING INSECURITY
IN THE LAST 12 MONTHS, WAS THERE A TIME WHEN YOU DID NOT HAVE A STEADY PLACE TO SLEEP OR SLEPT IN A SHELTER (INCLUDING NOW)?: NO

## 2023-04-18 SDOH — ECONOMIC STABILITY: FOOD INSECURITY: WITHIN THE PAST 12 MONTHS, YOU WORRIED THAT YOUR FOOD WOULD RUN OUT BEFORE YOU GOT MONEY TO BUY MORE.: NEVER TRUE

## 2023-04-18 SDOH — ECONOMIC STABILITY: INCOME INSECURITY: HOW HARD IS IT FOR YOU TO PAY FOR THE VERY BASICS LIKE FOOD, HOUSING, MEDICAL CARE, AND HEATING?: NOT HARD AT ALL

## 2023-04-18 SDOH — ECONOMIC STABILITY: TRANSPORTATION INSECURITY
IN THE PAST 12 MONTHS, HAS LACK OF TRANSPORTATION KEPT YOU FROM MEETINGS, WORK, OR FROM GETTING THINGS NEEDED FOR DAILY LIVING?: NO

## 2023-04-18 SDOH — ECONOMIC STABILITY: FOOD INSECURITY: WITHIN THE PAST 12 MONTHS, THE FOOD YOU BOUGHT JUST DIDN'T LAST AND YOU DIDN'T HAVE MONEY TO GET MORE.: NEVER TRUE

## 2023-04-18 ASSESSMENT — PATIENT HEALTH QUESTIONNAIRE - PHQ9
SUM OF ALL RESPONSES TO PHQ QUESTIONS 1-9: 0
2. FEELING DOWN, DEPRESSED OR HOPELESS: 0
SUM OF ALL RESPONSES TO PHQ9 QUESTIONS 1 & 2: 0
SUM OF ALL RESPONSES TO PHQ QUESTIONS 1-9: 0
SUM OF ALL RESPONSES TO PHQ QUESTIONS 1-9: 0
1. LITTLE INTEREST OR PLEASURE IN DOING THINGS: 0
SUM OF ALL RESPONSES TO PHQ QUESTIONS 1-9: 0

## 2023-04-18 ASSESSMENT — ENCOUNTER SYMPTOMS
NAUSEA: 0
VOMITING: 0
SORE THROAT: 0
CONSTIPATION: 0
BLOOD IN STOOL: 0
COUGH: 0
WHEEZING: 0
ABDOMINAL PAIN: 0
BACK PAIN: 0
SHORTNESS OF BREATH: 0
DIARRHEA: 0

## 2023-04-18 ASSESSMENT — LIFESTYLE VARIABLES
HOW MANY STANDARD DRINKS CONTAINING ALCOHOL DO YOU HAVE ON A TYPICAL DAY: PATIENT DOES NOT DRINK
HOW OFTEN DO YOU HAVE A DRINK CONTAINING ALCOHOL: NEVER

## 2023-04-18 NOTE — PROGRESS NOTES
prior. or sooner if necessary. Call or go to ED immediately if symptoms worsen or persist.    Educational materials and/or home exercises printed for patient's review and were included in patient instructions on his/her AfterVisit Summary and given to patient at the end of visit. Counseled regarding above diagnosis,including possible risks and complications,  especially if left uncontrolled. Counseled regarding the possible side effects, risks, benefits and alternatives to treatment; patient and/or guardian verbalizes understanding, agrees, feels comfortable with and wishes to proceed with above treatment plan. Advised patient tocall with any new medication issues, and read all Rx info from pharmacy to assureaware of all possible risks and side effects of medication before taking. Reviewed age and gender appropriate health screening exams and vaccinations. Advisedpatient regarding importance of keeping up with recommended health maintenance andto schedule as soon as possible if overdue, as this is important in assessing forundiagnosed pathology, especially cancer, as well as protecting against potentially harmful/life threatening disease. Patient and/or guardian verbalizes understandingand agrees with above counseling, assessment and plan. All questions answered.     Sonia Chandler MD on 4/18/23

## 2023-04-19 DIAGNOSIS — M19.90 ARTHRITIS: ICD-10-CM

## 2023-04-19 DIAGNOSIS — R60.0 BILATERAL LEG EDEMA: ICD-10-CM

## 2023-04-19 DIAGNOSIS — I73.00 RAYNAUD'S PHENOMENON WITHOUT GANGRENE: ICD-10-CM

## 2023-04-19 LAB
CRP SERPL HS-MCNC: 0.8 MG/DL (ref 0–0.4)
ERYTHROCYTE [SEDIMENTATION RATE] IN BLOOD BY WESTERGREN METHOD: 40 MM/HR (ref 0–20)
RHEUMATOID FACT SER NEPH-ACNC: <10 IU/ML (ref 0–13)

## 2023-04-21 ENCOUNTER — OFFICE VISIT (OUTPATIENT)
Dept: RHEUMATOLOGY | Age: 68
End: 2023-04-21
Payer: MEDICARE

## 2023-04-21 VITALS — HEIGHT: 65 IN | BODY MASS INDEX: 31.16 KG/M2 | WEIGHT: 187 LBS

## 2023-04-21 DIAGNOSIS — G62.9 NEUROPATHY: ICD-10-CM

## 2023-04-21 DIAGNOSIS — R60.0 BILATERAL LOWER EXTREMITY EDEMA: Primary | ICD-10-CM

## 2023-04-21 DIAGNOSIS — R23.0 CYANOSIS: ICD-10-CM

## 2023-04-21 DIAGNOSIS — R60.0 BILATERAL LOWER EXTREMITY EDEMA: ICD-10-CM

## 2023-04-21 LAB
BACTERIA URNS QL MICRO: ABNORMAL /HPF
BILIRUB UR QL STRIP: NEGATIVE
CCP AB SER IA-ACNC: 1.8 U/ML (ref 0–7)
CLARITY UR: CLEAR
COLOR UR: YELLOW
EPI CELLS #/AREA URNS HPF: ABNORMAL /HPF
GLUCOSE UR STRIP-MCNC: NEGATIVE MG/DL
HGB UR QL STRIP: NEGATIVE
KETONES UR STRIP-MCNC: NEGATIVE MG/DL
LEUKOCYTE ESTERASE UR QL STRIP: NEGATIVE
MUCOUS THREADS URNS QL MICRO: PRESENT /LPF
NITRITE UR QL STRIP: NEGATIVE
PH UR STRIP: 5.5 [PH] (ref 5–9)
PROT UR STRIP-MCNC: NORMAL MG/DL
RBC #/AREA URNS HPF: ABNORMAL /HPF (ref 0–2)
SP GR UR STRIP: 1.02 (ref 1–1.03)
UROBILINOGEN UR STRIP-ACNC: 0.2 E.U./DL
WBC #/AREA URNS HPF: ABNORMAL /HPF (ref 0–5)

## 2023-04-21 PROCEDURE — 1123F ACP DISCUSS/DSCN MKR DOCD: CPT | Performed by: INTERNAL MEDICINE

## 2023-04-21 PROCEDURE — 99204 OFFICE O/P NEW MOD 45 MIN: CPT | Performed by: INTERNAL MEDICINE

## 2023-04-21 ASSESSMENT — ENCOUNTER SYMPTOMS
TROUBLE SWALLOWING: 0
COUGH: 0
DIARRHEA: 0
NAUSEA: 0
VOMITING: 0
ABDOMINAL PAIN: 0
COLOR CHANGE: 1
SHORTNESS OF BREATH: 0

## 2023-04-21 NOTE — PATIENT INSTRUCTIONS
I suspect we are dealing more so with venous insufficiency than any underlying autoimmune disease but will need further workup

## 2023-04-21 NOTE — PROGRESS NOTES
04/17/2023     04/17/2023     Lab Results   Component Value Date     04/17/2023    K 4.1 04/17/2023     04/17/2023    CO2 27 04/17/2023    BUN 17 04/17/2023    CREATININE 0.6 04/17/2023    GLUCOSE 101 (H) 04/17/2023    CALCIUM 10.3 (H) 04/17/2023    PROT 7.7 04/17/2023    LABALBU 4.6 04/17/2023    BILITOT <0.2 04/17/2023    ALKPHOS 110 (H) 04/17/2023    AST 22 04/17/2023    ALT 28 04/17/2023    LABGLOM >60 04/17/2023    GFRAA >60 08/04/2022     No results found for: LYUDMILA  No results found for: RHEUMFACTOR  Lab Results   Component Value Date    SEDRATE 40 (H) 04/19/2023     Lab Results   Component Value Date    CRP 0.8 (H) 04/19/2023            An electronic signature was used to authenticate this note. This note was generated with a voice recognition dictation system. Please disregard any errors or omission which have escaped my review.     --Karina Murphy,

## 2023-04-24 LAB — ANA SER QL: NEGATIVE

## 2023-04-25 LAB
ENA TO SSA (RO) ANTIBODY: NEGATIVE
ENA TO SSB (LA) ANTIBODY: NEGATIVE
Lab: NORMAL
REPORT: NORMAL
SCLERODERMA (SCL-70) AB: NEGATIVE
THIS TEST SENT TO: NORMAL

## 2023-04-26 LAB
ANCA IFA PATTERN: NORMAL
ANCA IFA TITER: NORMAL
MYELOPEROXIDASE AB SER-ACNC: 0 AU/ML (ref 0–19)
PROTEINASE3 AB SER-ACNC: 0 AU/ML (ref 0–19)

## 2023-04-27 LAB
Lab: NORMAL
REPORT: NORMAL
THIS TEST SENT TO: NORMAL

## 2023-04-28 ENCOUNTER — PATIENT MESSAGE (OUTPATIENT)
Dept: RHEUMATOLOGY | Age: 68
End: 2023-04-28

## 2023-04-28 DIAGNOSIS — R60.0 BILATERAL LOWER EXTREMITY EDEMA: ICD-10-CM

## 2023-04-28 DIAGNOSIS — R23.0 CYANOSIS: ICD-10-CM

## 2023-05-01 ENCOUNTER — TELEPHONE (OUTPATIENT)
Dept: VASCULAR SURGERY | Age: 68
End: 2023-05-01

## 2023-05-01 PROBLEM — R23.0 CYANOSIS: Status: ACTIVE | Noted: 2023-05-01

## 2023-05-01 PROBLEM — R60.0 BILATERAL LOWER EXTREMITY EDEMA: Status: ACTIVE | Noted: 2023-05-01

## 2023-05-01 NOTE — TELEPHONE ENCOUNTER
From: Chu Officer  To: Dr. Rome Hoots: 4/28/2023 10:07 AM EDT  Subject: Thank you and waiting for Echo    Dr. Devaughn Osman: Thank you for your attentive care in ordering labs to see if I had an autoimmune issue. I am glad I did not. I have my Echo test scheduled for May 16, the soonest I could get in. My feet, ankles and 3/4 up my leg are still persisting in redness and warmth, but wearing compression socks during the day helps. I think you are totally correct for me to see a vascular doc. Will you refer me after my Echo test or before May 16 Echo? Thank you and office for efficient care.   Enoch Nicole

## 2023-05-16 ENCOUNTER — HOSPITAL ENCOUNTER (OUTPATIENT)
Dept: CARDIOLOGY | Age: 68
Discharge: HOME | End: 2023-05-16
Payer: MEDICARE

## 2023-05-16 DIAGNOSIS — R60.0 BILATERAL LOWER EXTREMITY EDEMA: ICD-10-CM

## 2023-05-16 LAB
LV EF: 55 %
LVEF MODALITY: NORMAL

## 2023-05-16 PROCEDURE — 93306 TTE W/DOPPLER COMPLETE: CPT

## 2023-05-24 ENCOUNTER — OFFICE VISIT (OUTPATIENT)
Dept: FAMILY MEDICINE CLINIC | Age: 68
End: 2023-05-24
Payer: MEDICARE

## 2023-05-24 VITALS
DIASTOLIC BLOOD PRESSURE: 74 MMHG | HEIGHT: 65 IN | BODY MASS INDEX: 31.87 KG/M2 | OXYGEN SATURATION: 97 % | RESPIRATION RATE: 18 BRPM | SYSTOLIC BLOOD PRESSURE: 122 MMHG | HEART RATE: 76 BPM | WEIGHT: 191.3 LBS | TEMPERATURE: 97.6 F

## 2023-05-24 DIAGNOSIS — R06.01 ORTHOPNEA: ICD-10-CM

## 2023-05-24 DIAGNOSIS — I35.1 AORTIC VALVE INSUFFICIENCY, ETIOLOGY OF CARDIAC VALVE DISEASE UNSPECIFIED: Primary | ICD-10-CM

## 2023-05-24 DIAGNOSIS — R06.00 PND (PAROXYSMAL NOCTURNAL DYSPNEA): ICD-10-CM

## 2023-05-24 DIAGNOSIS — R60.0 BILATERAL LOWER EXTREMITY EDEMA: ICD-10-CM

## 2023-05-24 PROCEDURE — 1123F ACP DISCUSS/DSCN MKR DOCD: CPT | Performed by: FAMILY MEDICINE

## 2023-05-24 PROCEDURE — 3078F DIAST BP <80 MM HG: CPT | Performed by: FAMILY MEDICINE

## 2023-05-24 PROCEDURE — 99214 OFFICE O/P EST MOD 30 MIN: CPT | Performed by: FAMILY MEDICINE

## 2023-05-24 PROCEDURE — 3074F SYST BP LT 130 MM HG: CPT | Performed by: FAMILY MEDICINE

## 2023-05-24 RX ORDER — FUROSEMIDE 20 MG/1
20 TABLET ORAL DAILY
Qty: 90 TABLET | Refills: 1 | Status: SHIPPED | OUTPATIENT
Start: 2023-05-24

## 2023-05-24 RX ORDER — VIT C/B6/B5/MAGNESIUM/HERB 173 50-5-6-5MG
500 CAPSULE ORAL DAILY
COMMUNITY

## 2023-05-24 ASSESSMENT — ENCOUNTER SYMPTOMS
VOMITING: 0
CONSTIPATION: 0
SORE THROAT: 0
BLOOD IN STOOL: 0
ABDOMINAL PAIN: 0
WHEEZING: 0
DIARRHEA: 0
SHORTNESS OF BREATH: 0
COUGH: 0
NAUSEA: 0
BACK PAIN: 0

## 2023-05-24 NOTE — PROGRESS NOTES
Abdiaziz Olivares is a 78 y.o. female who presents today for     Chief Complaint   Patient presents with    Results     Go over echo results, Dr Lupe Trinidad advised to see pcp regarding results. ASSESSMENT/PLAN, 4/18/23 VISIT:     Raynaud's phenomenon without gangrene: Patient reports cycling between red very warm lower extremities and feet, to a more weight and cool, to a very cold and dark purple color. Patient has not experienced this in the past nor does she have a personal history or family history of autoimmune conditions. Will refer to rheumatology as well as initiate lab work. -     C-Reactive Protein; Future  -     Cyclic Citrul Peptide Antibody, IgG; Future  -     Rheumatoid Factor; Future  -     Sedimentation Rate; Future  -     [address], Rheumatology, [place]    Arthritis: Patient endorses pain in addition to the existing edema and discoloration of her feet. No imaging up to this point. Will refer to rheumatology as well as initiate labs. -     [address], Rheumatology, [place]  -     C-Reactive Protein; Future  -     Cyclic Citrul Peptide Antibody, IgG; Future  -     Rheumatoid Factor; Future  -     Sedimentation Rate; Future    Bilateral leg edema: Broad differential, although cardiac etiology is less likely at this time. Will refer to rheumatology and initiate lab work. -     [address], Rheumatology, [place]  -     C-Reactive Protein; Future  -     Cyclic Citrul Peptide Antibody, IgG; Future  -     Rheumatoid Factor; Future  -     Sedimentation Rate; Future    Primary hypertension: Patient's blood pressure has been increasingly uncontrolled, with blood pressures in the emergency department with a systolic of 771, will increase her lisinopril dose at this time and continue to monitor.  -     lisinopril (PRINIVIL;ZESTRIL) 20 MG tablet;  Indications: High Blood Pressure Disorder TAKE 1 TABLET DAILY    CURRENT STATUS (05/24/23):   Plan of

## 2023-05-31 DIAGNOSIS — E55.9 VITAMIN D INSUFFICIENCY: ICD-10-CM

## 2023-05-31 DIAGNOSIS — I10 ESSENTIAL HYPERTENSION: Primary | ICD-10-CM

## 2023-05-31 DIAGNOSIS — E78.5 DYSLIPIDEMIA (HIGH LDL; LOW HDL): ICD-10-CM

## 2023-06-08 DIAGNOSIS — R60.0 BILATERAL LOWER EXTREMITY EDEMA: ICD-10-CM

## 2023-06-08 DIAGNOSIS — I35.1 AORTIC VALVE INSUFFICIENCY, ETIOLOGY OF CARDIAC VALVE DISEASE UNSPECIFIED: ICD-10-CM

## 2023-06-08 DIAGNOSIS — R06.01 ORTHOPNEA: ICD-10-CM

## 2023-06-08 DIAGNOSIS — R06.00 PND (PAROXYSMAL NOCTURNAL DYSPNEA): ICD-10-CM

## 2023-06-08 LAB
ANION GAP SERPL CALCULATED.3IONS-SCNC: 9 MMOL/L (ref 7–16)
BNP BLD-MCNC: 154 PG/ML (ref 0–125)
BUN SERPL-MCNC: 19 MG/DL (ref 6–23)
CALCIUM SERPL-MCNC: 9.5 MG/DL (ref 8.6–10.2)
CHLORIDE SERPL-SCNC: 104 MMOL/L (ref 98–107)
CO2 SERPL-SCNC: 26 MMOL/L (ref 22–29)
CREAT SERPL-MCNC: 0.6 MG/DL (ref 0.5–1)
GLUCOSE SERPL-MCNC: 93 MG/DL (ref 74–99)
POTASSIUM SERPL-SCNC: 4.2 MMOL/L (ref 3.5–5)
SODIUM SERPL-SCNC: 139 MMOL/L (ref 132–146)

## 2023-06-19 ENCOUNTER — OFFICE VISIT (OUTPATIENT)
Dept: VASCULAR SURGERY | Age: 68
End: 2023-06-19
Payer: MEDICARE

## 2023-06-19 VITALS — HEIGHT: 65 IN | BODY MASS INDEX: 30.82 KG/M2 | WEIGHT: 185 LBS

## 2023-06-19 DIAGNOSIS — I87.2 VENOUS INSUFFICIENCY OF BOTH LOWER EXTREMITIES: Primary | ICD-10-CM

## 2023-06-19 PROCEDURE — 99204 OFFICE O/P NEW MOD 45 MIN: CPT | Performed by: SURGERY

## 2023-06-19 PROCEDURE — 1123F ACP DISCUSS/DSCN MKR DOCD: CPT | Performed by: SURGERY

## 2023-06-19 NOTE — PROGRESS NOTES
Vascular Surgery Outpatient Consultation    Reason for Consult:  Lower extremity edema    PCP : Ollie Simms MD  Podiatrist :     HISTORY OF PRESENT ILLNESS:    The patient is a 80 y.o. female who presents in regards to lower extremity edema. Since 3/2023 she has noted calf pains, swelling symptoms are right greater than left. Symptoms include pain, aching, fatigue, burning, and edema typically worse as on feet more, at end of the day. Pain at its worst is a 8/10. The varicose veins seems to be related to venous insufficiency. She also has discoloration of her toes and also some redness of her ankles R > L. Her toe discoloration has been present for more than 5 years. She has bilateral osteoarthritis of her knees. She also has known hx of bilateral lower extremity neuropathy which she developed after chemotherapy for breast cancer done in 2018. She is cancer free per her report. She does take pain medication diclofenac. Her numbness and tingling in her lower extremity and feet, toes worst at night, 10/10. She also has had workup per rheumatology - no noted issues on their workup. The patient does use compression stockings in the past, she is unsure if they are 8-15 or 20-30 mm hg. They help a lot with her swelling and sxs control. They do not have a hx of DVT in the past.      Remote hx of tobacco.  She is not a DM.        ROS : All others Negative if blank [], Positive if [x]  General Urinary   [] Fevers [] Hematuria   [] Chills [] Dysuria   [] Weight Loss Vascular   Skin [] Claudication   [] Tissue Loss [] Rest Pain   Eyes Neurologic   [x] Wears Glasses/Contacts [] Stroke/TIA   [] Vision Changes [] Focal weakness   Respiratory [] Slurred Speech    [] Shortness of breath ENT   Cardiovascular [] Difficulty swallowing   [] Chest Pain Endocrine    [] Shortness of breath with exertion [] Increased Thirst   Gastrointestinal    [] Abdominal Pain    [] Melena   [] Hematochezia

## 2023-06-20 ENCOUNTER — EVALUATION (OUTPATIENT)
Dept: PHYSICAL THERAPY | Age: 68
End: 2023-06-20

## 2023-06-20 DIAGNOSIS — M25.561 CHRONIC PAIN OF BOTH KNEES: ICD-10-CM

## 2023-06-20 DIAGNOSIS — M25.562 CHRONIC PAIN OF BOTH KNEES: ICD-10-CM

## 2023-06-20 DIAGNOSIS — G89.29 CHRONIC PAIN OF BOTH KNEES: ICD-10-CM

## 2023-06-20 DIAGNOSIS — M25.552 BILATERAL HIP PAIN: Primary | ICD-10-CM

## 2023-06-20 DIAGNOSIS — M25.551 BILATERAL HIP PAIN: Primary | ICD-10-CM

## 2023-06-20 NOTE — PROGRESS NOTES
Physical Therapy Daily Treatment Note    Date: 2023  Patient Name: Lina Luu  : 1955   MRN: [id number]  DOInjury: 3/2023   DOSx: --  Referring Provider: Dodie Ross MD   [place] [id number] [address]     Medical Diagnosis:      Diagnosis Orders   1. Bilateral hip pain        2. Chronic pain of both knees          Aleida Lim has both bilateral gluteal tendinopathy and rosalee knee pain related to OA, along with other musculoskeletal problems and medication-induced musculoskeletal pain. Treatment for both joint pain and tendon pain is a progressive loading program.  The nature of these programs is slow, methodical advancement of the exercise regimen. As such, we have have to focus on one area at a time (for example hips first, then knees). Certainly, we will work both areas in the clinic when practical.  We will use the pain monitoring model for progression; which was discussed in detail today. We may also space out her appointments to allow for more adaptation before we progress the routine. Outcome Measure:   Lower Extremity Functional Scale (LEFS) 64% impairment      Access Code: [email or web address]  URL: [email or web address]  Date: 2023  Prepared by: Callie Eugene    Exercises  - Supine Bridge  - 2-3 x weekly - 3 sets - 10-12 reps  - Clamshell (Mirrored)  - 2-3 x weekly - 3 sets - 10-12 reps    X = TO BE PERFORMED NEXT VISIT  > = PROGRESS TO THIS    S: See eval  O:   Time 1460-3531       Visit ? Pain Pain -9/10  Repeat outcome measure at mid point and end. ROM        Modalities         Ice     MO   Stretch             TE   Exercise         Phase 1        QS X? NR   Heel slides X? NR   Bridging 2-leg 3 x 15   NR   S-lying hip ABD R LE   X?  Will add L LE as R hip calms NR   Clamshell R LE   3 x 15 Will add L LE as R hip calms NR             Phase 2         Clamshell Add resistance      TE   S-lying hip ABD Add resistance     TE   SLR      TE
Benigno Jerry []+ / [] -      [] KIMI []+ / [] -   [] Bounce Home []+ / [] -   [x] Vinny []+ / [x] -   [] Pivot Shift []+ / [] -   [] Posterior Drawer []+ / [] -   [x] Varus Stress []+ / [x] -   [] Ankle Ant Drawer []+ / [] -     [] Ankle Eversion Stress: []+ / [] -  [] Chapa Test []+ / [] -         Special test comments: Knees stable    ASSESSMENT     Barbara Fowler has both bilateral gluteal tendinopathy and rosalee knee pain related to OA, along with other musculoskeletal problems and medication-induced musculoskeletal pain. Treatment for both joint pain and tendon pain is a progressive loading program.  The nature of these programs is slow, methodical advancement of the exercise regimen. As such, we have have to focus on one area at a time (for example hips first, then knees).   Certainly, we will work both areas in the clinic when practical.     Outcome Measure:   Lower Extremity Functional Scale (LEFS) 64% impairment    Problems:   Pain 9/10 constant, waxing / waning  Strength decreased   Limitations with ADLs , IADLs , use of right lower extremity, use of left lower extremity, standing tolerance , walking, stairs, inability to participate in exercise regimen / fitness program, sleep quality    [x] There are no barriers affecting plan of care or recovery    [] Barriers to this patient's plan of care or recovery include:    Domestic Concerns:  [x] No  [] Yes:    Short Term goals (4 weeks)  Pain 4/10  Lower Extremity Functional Scale (LEFS) 50% impairment    Long Term goals (8 weeks)  Pain 0-2/10  Strength 4+/5  Able to perform / complete the following functions / tasks: ADLs, IADLs, ambulate without assistive device, normal gait, tolerate weightbearing activities (standing, walking) for 30 minutes - 1 hour, return to exercise regimen , improved sleep quality and duration   LEFS 30% impairment  Independent with home exercise program (HEP)    Rehab Potential: [x] Good  [] Fair  [] Poor    PLAN       Treatment Plan:

## 2023-06-29 ENCOUNTER — TREATMENT (OUTPATIENT)
Dept: PHYSICAL THERAPY | Age: 68
End: 2023-06-29

## 2023-06-29 DIAGNOSIS — M25.551 BILATERAL HIP PAIN: Primary | ICD-10-CM

## 2023-06-29 DIAGNOSIS — M25.561 CHRONIC PAIN OF BOTH KNEES: ICD-10-CM

## 2023-06-29 DIAGNOSIS — M25.552 BILATERAL HIP PAIN: Primary | ICD-10-CM

## 2023-06-29 DIAGNOSIS — G89.29 CHRONIC PAIN OF BOTH KNEES: ICD-10-CM

## 2023-06-29 DIAGNOSIS — M25.562 CHRONIC PAIN OF BOTH KNEES: ICD-10-CM

## 2023-07-13 DIAGNOSIS — E55.9 VITAMIN D INSUFFICIENCY: ICD-10-CM

## 2023-07-13 DIAGNOSIS — E78.5 DYSLIPIDEMIA (HIGH LDL; LOW HDL): ICD-10-CM

## 2023-07-13 DIAGNOSIS — I10 ESSENTIAL HYPERTENSION: ICD-10-CM

## 2023-07-13 LAB
ALBUMIN SERPL-MCNC: 4.3 G/DL (ref 3.5–5.2)
ALP SERPL-CCNC: 91 U/L (ref 35–104)
ALT SERPL-CCNC: 24 U/L (ref 0–32)
ANION GAP SERPL CALCULATED.3IONS-SCNC: 13 MMOL/L (ref 7–16)
AST SERPL-CCNC: 25 U/L (ref 0–31)
BASOPHILS # BLD: 0.04 E9/L (ref 0–0.2)
BASOPHILS NFR BLD: 0.7 % (ref 0–2)
BILIRUB SERPL-MCNC: 0.3 MG/DL (ref 0–1.2)
BUN SERPL-MCNC: 16 MG/DL (ref 6–23)
CALCIUM SERPL-MCNC: 9.6 MG/DL (ref 8.6–10.2)
CHLORIDE SERPL-SCNC: 108 MMOL/L (ref 98–107)
CHOLESTEROL, TOTAL: 214 MG/DL (ref 0–199)
CO2 SERPL-SCNC: 22 MMOL/L (ref 22–29)
CREAT SERPL-MCNC: 0.6 MG/DL (ref 0.5–1)
EOSINOPHIL # BLD: 0.15 E9/L (ref 0.05–0.5)
EOSINOPHIL NFR BLD: 2.7 % (ref 0–6)
ERYTHROCYTE [DISTWIDTH] IN BLOOD BY AUTOMATED COUNT: 13.2 FL (ref 11.5–15)
GLUCOSE SERPL-MCNC: 83 MG/DL (ref 74–99)
HCT VFR BLD AUTO: 41.5 % (ref 34–48)
HDLC SERPL-MCNC: 57 MG/DL
HGB BLD-MCNC: 12.9 G/DL (ref 11.5–15.5)
IMM GRANULOCYTES # BLD: 0.01 E9/L
IMM GRANULOCYTES NFR BLD: 0.2 % (ref 0–5)
LDLC SERPL CALC-MCNC: 127 MG/DL (ref 0–99)
LYMPHOCYTES # BLD: 1.04 E9/L (ref 1.5–4)
LYMPHOCYTES NFR BLD: 18.4 % (ref 20–42)
MCH RBC QN AUTO: 30.6 PG (ref 26–35)
MCHC RBC AUTO-ENTMCNC: 31.1 % (ref 32–34.5)
MCV RBC AUTO: 98.3 FL (ref 80–99.9)
MONOCYTES # BLD: 0.39 E9/L (ref 0.1–0.95)
MONOCYTES NFR BLD: 6.9 % (ref 2–12)
NEUTROPHILS # BLD: 4.02 E9/L (ref 1.8–7.3)
NEUTS SEG NFR BLD: 71.1 % (ref 43–80)
PLATELET # BLD AUTO: 247 E9/L (ref 130–450)
PMV BLD AUTO: 11.8 FL (ref 7–12)
POTASSIUM SERPL-SCNC: 4.7 MMOL/L (ref 3.5–5)
PROT SERPL-MCNC: 7.3 G/DL (ref 6.4–8.3)
RBC # BLD AUTO: 4.22 E12/L (ref 3.5–5.5)
SODIUM SERPL-SCNC: 143 MMOL/L (ref 132–146)
TRIGL SERPL-MCNC: 150 MG/DL (ref 0–149)
TSH SERPL-MCNC: 1.26 UIU/ML (ref 0.27–4.2)
VLDLC SERPL CALC-MCNC: 30 MG/DL
WBC # BLD: 5.7 E9/L (ref 4.5–11.5)

## 2023-07-14 LAB — VITAMIN D 25-HYDROXY: 46 NG/ML (ref 30–100)

## 2023-07-19 ENCOUNTER — OFFICE VISIT (OUTPATIENT)
Dept: FAMILY MEDICINE CLINIC | Age: 68
End: 2023-07-19
Payer: MEDICARE

## 2023-07-19 VITALS
WEIGHT: 190 LBS | DIASTOLIC BLOOD PRESSURE: 72 MMHG | OXYGEN SATURATION: 97 % | BODY MASS INDEX: 31.65 KG/M2 | SYSTOLIC BLOOD PRESSURE: 126 MMHG | RESPIRATION RATE: 16 BRPM | HEIGHT: 65 IN | TEMPERATURE: 97.6 F | HEART RATE: 76 BPM

## 2023-07-19 DIAGNOSIS — Z76.0 ENCOUNTER FOR MEDICATION REFILL: ICD-10-CM

## 2023-07-19 DIAGNOSIS — Z00.00 MEDICARE ANNUAL WELLNESS VISIT, SUBSEQUENT: Primary | ICD-10-CM

## 2023-07-19 DIAGNOSIS — M17.0 BILATERAL PRIMARY OSTEOARTHRITIS OF KNEE: ICD-10-CM

## 2023-07-19 PROCEDURE — 3078F DIAST BP <80 MM HG: CPT | Performed by: FAMILY MEDICINE

## 2023-07-19 PROCEDURE — 1123F ACP DISCUSS/DSCN MKR DOCD: CPT | Performed by: FAMILY MEDICINE

## 2023-07-19 PROCEDURE — 3074F SYST BP LT 130 MM HG: CPT | Performed by: FAMILY MEDICINE

## 2023-07-19 PROCEDURE — G0447 BEHAVIOR COUNSEL OBESITY 15M: HCPCS | Performed by: FAMILY MEDICINE

## 2023-07-19 PROCEDURE — G0439 PPPS, SUBSEQ VISIT: HCPCS | Performed by: FAMILY MEDICINE

## 2023-07-19 ASSESSMENT — PATIENT HEALTH QUESTIONNAIRE - PHQ9
2. FEELING DOWN, DEPRESSED OR HOPELESS: 0
SUM OF ALL RESPONSES TO PHQ QUESTIONS 1-9: 0
SUM OF ALL RESPONSES TO PHQ QUESTIONS 1-9: 0
SUM OF ALL RESPONSES TO PHQ9 QUESTIONS 1 & 2: 0
SUM OF ALL RESPONSES TO PHQ QUESTIONS 1-9: 0
SUM OF ALL RESPONSES TO PHQ QUESTIONS 1-9: 0
1. LITTLE INTEREST OR PLEASURE IN DOING THINGS: 0

## 2023-08-03 ENCOUNTER — OFFICE VISIT (OUTPATIENT)
Dept: ONCOLOGY | Age: 68
End: 2023-08-03
Payer: MEDICARE

## 2023-08-03 ENCOUNTER — HOSPITAL ENCOUNTER (OUTPATIENT)
Dept: INFUSION THERAPY | Age: 68
Discharge: HOME | End: 2023-08-03
Payer: MEDICARE

## 2023-08-03 VITALS
HEART RATE: 63 BPM | WEIGHT: 191 LBS | HEIGHT: 65 IN | TEMPERATURE: 97.6 F | OXYGEN SATURATION: 98 % | BODY MASS INDEX: 31.82 KG/M2

## 2023-08-03 DIAGNOSIS — Z85.3 PERSONAL HISTORY OF BREAST CANCER: Primary | ICD-10-CM

## 2023-08-03 DIAGNOSIS — Z17.0 MALIGNANT NEOPLASM OF UPPER-OUTER QUADRANT OF LEFT BREAST IN FEMALE, ESTROGEN RECEPTOR POSITIVE (HCC): Primary | ICD-10-CM

## 2023-08-03 DIAGNOSIS — C50.412 MALIGNANT NEOPLASM OF UPPER-OUTER QUADRANT OF LEFT BREAST IN FEMALE, ESTROGEN RECEPTOR POSITIVE (HCC): Primary | ICD-10-CM

## 2023-08-03 DIAGNOSIS — Z79.811 USE OF AROMATASE INHIBITORS: ICD-10-CM

## 2023-08-03 PROCEDURE — 6360000002 HC RX W HCPCS: Performed by: INTERNAL MEDICINE

## 2023-08-03 PROCEDURE — 96372 THER/PROPH/DIAG INJ SC/IM: CPT

## 2023-08-03 PROCEDURE — 99212 OFFICE O/P EST SF 10 MIN: CPT

## 2023-08-03 RX ORDER — ANASTROZOLE 1 MG/1
1 TABLET ORAL DAILY
Qty: 90 TABLET | Refills: 2 | Status: SHIPPED | OUTPATIENT
Start: 2023-08-03

## 2023-08-03 RX ADMIN — DENOSUMAB 60 MG: 60 INJECTION SUBCUTANEOUS at 10:56

## 2023-08-03 NOTE — PROGRESS NOTES
Department of [facility] Oncology      Attending Clinic Note    Reason for Visit:  Follow-up on a patient with Left Breast Cancer    PCP:  Giuseppe Foote MD    History of Present Illness:  80 y.o. female status post Left simple mastectomy/SLNB and right prophylactic mastectomy on 04/09/2018. Left multifocal ER >90%/SC 90%, HER2 negative (0). 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 pT2 pN1a    Right breast [facility]    4/20/18 s/p Left ALND  Left axilla, lymph node dissection - One of three lymph nodes, positive for metastatic carcinoma (1/3). - Size of largest metastatic deposit: 3.5 mm.   - No extracapsular extension identified. - Fibroadipose tissue and skeletal muscle with changes consistent with prior surgery site    The final pathologic stage is mpT2 pN1a. Stage IIB: (T2, N1, M0)  S/p adjuvant chemotherapy Dose dense AC x4, followed by Taxol x 4 completed 08/28/2018. RT was completed on 11/15/2018. Baseline DEXA 10/18 osteopenia. Currently on Arimidex since Nov 2018. Today 08/03/2023; No fever, chills. Fair appetite. Mild fatigue. Worsening arthralgias. Review of Systems;  CONSTITUTIONAL: No fever, chills. Fair appetite. Mild fatigue. ENMT: Eyes: No diplopia; Nose:No epistaxis. Mouth: No sore throat. RESPIRATORY: No hemoptysis, shortness of breath. CARDIOVASCULAR: No chest pain, palpitations. GASTROINTESTINAL: No nausea/vomiting, abdominal pain. MSK: Worsening arthralgias  GENITOURINARY: No dysuria, urinary frequency, hematuria. NEURO: No syncope, presyncope, headache.   Remainder:  ROS NEGATIVE    Past Medical History:      Diagnosis Date    Breast cancer ([address]) 03/2018    Cancer ([address])     Hypertension     Leg swelling

## 2023-09-13 ENCOUNTER — OFFICE VISIT (OUTPATIENT)
Dept: FAMILY MEDICINE CLINIC | Age: 68
End: 2023-09-13
Payer: MEDICARE

## 2023-09-13 VITALS
RESPIRATION RATE: 16 BRPM | TEMPERATURE: 97.8 F | HEIGHT: 65 IN | SYSTOLIC BLOOD PRESSURE: 138 MMHG | WEIGHT: 192 LBS | OXYGEN SATURATION: 98 % | DIASTOLIC BLOOD PRESSURE: 82 MMHG | BODY MASS INDEX: 31.99 KG/M2 | HEART RATE: 74 BPM

## 2023-09-13 DIAGNOSIS — I87.2 VENOUS INSUFFICIENCY OF BOTH LOWER EXTREMITIES: ICD-10-CM

## 2023-09-13 DIAGNOSIS — M25.562 CHRONIC PAIN OF BOTH KNEES: ICD-10-CM

## 2023-09-13 DIAGNOSIS — M25.561 CHRONIC PAIN OF BOTH KNEES: ICD-10-CM

## 2023-09-13 DIAGNOSIS — G89.29 CHRONIC PAIN OF BOTH KNEES: ICD-10-CM

## 2023-09-13 DIAGNOSIS — Z01.818 PRE-OP EXAM: Primary | ICD-10-CM

## 2023-09-13 PROCEDURE — 99214 OFFICE O/P EST MOD 30 MIN: CPT | Performed by: FAMILY MEDICINE

## 2023-09-13 PROCEDURE — 3079F DIAST BP 80-89 MM HG: CPT | Performed by: FAMILY MEDICINE

## 2023-09-13 PROCEDURE — 3075F SYST BP GE 130 - 139MM HG: CPT | Performed by: FAMILY MEDICINE

## 2023-09-13 PROCEDURE — 1123F ACP DISCUSS/DSCN MKR DOCD: CPT | Performed by: FAMILY MEDICINE

## 2023-09-13 PROCEDURE — 93000 ELECTROCARDIOGRAM COMPLETE: CPT | Performed by: FAMILY MEDICINE

## 2023-09-14 ASSESSMENT — PROMIS GLOBAL HEALTH SCALE
SUM OF RESPONSES TO QUESTIONS 3, 6, 7, & 8: 14
WHO IS THE PERSON COMPLETING THE PROMIS V1.1 SURVEY?: 0
SUM OF RESPONSES TO QUESTIONS 2, 4, 5, & 10: 11
IN GENERAL, WOULD YOU SAY YOUR QUALITY OF LIFE IS...[ON A SCALE OF 1 (POOR) TO 5 (EXCELLENT)]: 2
IN GENERAL, HOW WOULD YOU RATE YOUR SATISFACTION WITH YOUR SOCIAL ACTIVITIES AND RELATIONSHIPS [ON A SCALE OF 1 (POOR) TO 5 (EXCELLENT)]?: 2
IN GENERAL, PLEASE RATE HOW WELL YOU CARRY OUT YOUR USUAL SOCIAL ACTIVITIES (INCLUDES ACTIVITIES AT HOME, AT WORK, AND IN YOUR COMMUNITY, AND RESPONSIBILITIES AS A PARENT, CHILD, SPOUSE, EMPLOYEE, FRIEND, ETC) [ON A SCALE OF 1 (POOR) TO 5 (EXCELLENT)]?: 2
IN GENERAL, HOW WOULD YOU RATE YOUR MENTAL HEALTH, INCLUDING YOUR MOOD AND YOUR ABILITY TO THINK [ON A SCALE OF 1 (POOR) TO 5 (EXCELLENT)]?: 4
IN THE PAST 7 DAYS, HOW OFTEN HAVE YOU BEEN BOTHERED BY EMOTIONAL PROBLEMS, SUCH AS FEELING ANXIOUS, DEPRESSED, OR IRRITABLE [ON A SCALE FROM 1 (NEVER) TO 5 (ALWAYS)]?: 3
IN THE PAST 7 DAYS, HOW WOULD YOU RATE YOUR PAIN ON AVERAGE [ON A SCALE FROM 0 (NO PAIN) TO 10 (WORST IMAGINABLE PAIN)]?: 6
IN GENERAL, HOW WOULD YOU RATE YOUR PHYSICAL HEALTH [ON A SCALE OF 1 (POOR) TO 5 (EXCELLENT)]?: 2
HOW IS THE PROMIS V1.1 BEING ADMINISTERED?: 2
IN THE PAST 7 DAYS, HOW WOULD YOU RATE YOUR FATIGUE ON AVERAGE [ON A SCALE FROM 1 (NONE) TO 5 (VERY SEVERE)]?: 4
TO WHAT EXTENT ARE YOU ABLE TO CARRY OUT YOUR EVERYDAY PHYSICAL ACTIVITIES SUCH AS WALKING, CLIMBING STAIRS, CARRYING GROCERIES, OR MOVING A CHAIR [ON A SCALE OF 1 (NOT AT ALL) TO 5 (COMPLETELY)]?: 2
IN GENERAL, WOULD YOU SAY YOUR HEALTH IS...[ON A SCALE OF 1 (POOR) TO 5 (EXCELLENT)]: 3

## 2023-09-14 ASSESSMENT — KOOS JR
RISING FROM SITTING: 2
STANDING UPRIGHT: 2
HOW SEVERE IS YOUR KNEE STIFFNESS AFTER FIRST WAKING IN MORNING: 1
KOOS JR TOTAL INTERVAL SCORE: 57.14
TWISING OR PIVOTING ON KNEE: 1
GOING UP OR DOWN STAIRS: 3
BENDING TO THE FLOOR TO PICK UP OBJECT: 1
STRAIGHTENING KNEE FULLY: 2

## 2023-09-25 ENCOUNTER — ANESTHESIA EVENT (OUTPATIENT)
Dept: OPERATING ROOM | Age: 68
End: 2023-09-25
Payer: MEDICARE

## 2023-09-26 ENCOUNTER — HOSPITAL ENCOUNTER (OUTPATIENT)
Dept: PREADMISSION TESTING | Age: 68
Discharge: HOME | End: 2023-09-26
Payer: MEDICARE

## 2023-09-26 VITALS
HEIGHT: 65 IN | WEIGHT: 193 LBS | HEART RATE: 68 BPM | DIASTOLIC BLOOD PRESSURE: 85 MMHG | SYSTOLIC BLOOD PRESSURE: 183 MMHG | OXYGEN SATURATION: 98 % | RESPIRATION RATE: 20 BRPM | BODY MASS INDEX: 32.15 KG/M2 | TEMPERATURE: 98 F

## 2023-09-26 LAB
ANION GAP SERPL CALCULATED.3IONS-SCNC: 7 MMOL/L (ref 7–16)
BUN SERPL-MCNC: 13 MG/DL (ref 6–23)
CALCIUM SERPL-MCNC: 9.7 MG/DL (ref 8.6–10.2)
CHLORIDE SERPL-SCNC: 106 MMOL/L (ref 98–107)
CO2 SERPL-SCNC: 29 MMOL/L (ref 22–29)
CREAT SERPL-MCNC: 0.6 MG/DL (ref 0.5–1)
ERYTHROCYTE [DISTWIDTH] IN BLOOD BY AUTOMATED COUNT: 12.9 % (ref 11.5–15)
GFR SERPL CREATININE-BSD FRML MDRD: >60 ML/MIN/1.73M2
GLUCOSE SERPL-MCNC: 84 MG/DL (ref 74–99)
HCT VFR BLD AUTO: 39 % (ref 34–48)
HGB BLD-MCNC: 12.6 G/DL (ref 11.5–15.5)
MCH RBC QN AUTO: 30.5 PG (ref 26–35)
MCHC RBC AUTO-ENTMCNC: 32.3 G/DL (ref 32–34.5)
MCV RBC AUTO: 94.4 FL (ref 80–99.9)
PLATELET # BLD AUTO: 233 K/UL (ref 130–450)
PMV BLD AUTO: 10.8 FL (ref 7–12)
POTASSIUM SERPL-SCNC: 4.2 MMOL/L (ref 3.5–5)
RBC # BLD AUTO: 4.13 M/UL (ref 3.5–5.5)
SODIUM SERPL-SCNC: 142 MMOL/L (ref 132–146)
WBC OTHER # BLD: 5.1 K/UL (ref 4.5–11.5)

## 2023-09-26 PROCEDURE — 36415 COLL VENOUS BLD VENIPUNCTURE: CPT

## 2023-09-26 PROCEDURE — 85027 COMPLETE CBC AUTOMATED: CPT

## 2023-09-26 PROCEDURE — 87081 CULTURE SCREEN ONLY: CPT

## 2023-09-26 PROCEDURE — 80048 BASIC METABOLIC PNL TOTAL CA: CPT

## 2023-09-26 RX ORDER — FENTANYL CITRATE 50 UG/ML
100 INJECTION, SOLUTION INTRAMUSCULAR; INTRAVENOUS ONCE
OUTPATIENT
Start: 2023-09-26 | End: 2023-09-26

## 2023-09-26 RX ORDER — CALCIUM CARB/VITAMIN D3/VIT K1 650MG-12.5
TABLET,CHEWABLE ORAL DAILY
COMMUNITY

## 2023-09-26 RX ORDER — ROPIVACAINE HYDROCHLORIDE 5 MG/ML
30 INJECTION, SOLUTION EPIDURAL; INFILTRATION; PERINEURAL
OUTPATIENT
Start: 2023-09-26 | End: 2023-09-27

## 2023-09-26 RX ORDER — MIDAZOLAM HYDROCHLORIDE 2 MG/2ML
1 INJECTION, SOLUTION INTRAMUSCULAR; INTRAVENOUS EVERY 5 MIN PRN
OUTPATIENT
Start: 2023-09-26

## 2023-09-26 ASSESSMENT — PAIN - FUNCTIONAL ASSESSMENT: PAIN_FUNCTIONAL_ASSESSMENT: PREVENTS OR INTERFERES WITH MANY ACTIVE NOT PASSIVE ACTIVITIES

## 2023-09-26 ASSESSMENT — PAIN DESCRIPTION - ORIENTATION: ORIENTATION: RIGHT

## 2023-09-26 ASSESSMENT — PAIN DESCRIPTION - PAIN TYPE: TYPE: CHRONIC PAIN

## 2023-09-26 ASSESSMENT — KOOS JR
BENDING TO THE FLOOR TO PICK UP OBJECT: 2
STRAIGHTENING KNEE FULLY: 3
STANDING UPRIGHT: 2
KOOS JR TOTAL INTERVAL SCORE: 44.905
RISING FROM SITTING: 2
GOING UP OR DOWN STAIRS: 3
HOW SEVERE IS YOUR KNEE STIFFNESS AFTER FIRST WAKING IN MORNING: 2
TWISING OR PIVOTING ON KNEE: 3

## 2023-09-26 ASSESSMENT — PAIN SCALES - GENERAL: PAINLEVEL_OUTOF10: 9

## 2023-09-26 ASSESSMENT — PAIN DESCRIPTION - LOCATION: LOCATION: KNEE

## 2023-09-26 NOTE — PROGRESS NOTES
I met with Mel Alba this am for an orthopaedic education class. We discussed information regarding pre, intra, post-op, discharge, and LOS expectations. I provided ortho education materials. I encouraged the patient to call with any questions or concerns.

## 2023-09-26 NOTE — ANESTHESIA PRE PROCEDURE
Department of Anesthesiology  Preprocedure Note       Name:  Reyna Anderson   Age:  80 y.o.  :  1955                                          MRN:  [id number]         Date:  2023      Surgeon: Monica Grayson):  Marko Fernández DO    Procedure: Procedure(s):  RIGHT KNEE TOTAL ARTHROPLASTY   +++GERMAN+++   ++PNB++    Medications prior to admission:   Prior to Admission medications    Medication Sig Start Date End Date Taking?  Authorizing Provider   anastrozole (ARIMIDEX) 1 MG tablet Take 1 tablet by mouth daily 8/3/23   RENEE Leon   diclofenac (VOLTAREN) 50 MG EC tablet Take 1 tablet by mouth 2 times daily 23  Denis Varela MD   Handicap Placard MISC by Does not apply route Patient cannot walk 200 ft without stopping to rest.    Expiration 20   Denis Varela MD   turmeric 500 MG CAPS Take 1 capsule by mouth daily    Historical Provider, MD   lisinopril (PRINIVIL;ZESTRIL) 20 MG tablet Indications: High Blood Pressure Disorder TAKE 1 TABLET DAILY 23  Denis Varela MD   magnesium 200 MG TABS tablet Take 1 tablet by mouth daily    Historical Provider, MD   melatonin 10 MG CAPS capsule Take 1 capsule by mouth nightly    Historical Provider, MD   Cyanocobalamin (B-12) 2500 MCG TABS Take by mouth    Historical Provider, MD   denosumab (PROLIA) 60 MG/ML SOSY SC injection Inject 1 mL into the skin once    Historical Provider, MD   Omega-3 Fatty Acids (OMEGA-3 FISH OIL) 500 MG CAPS Take by mouth daily chewables    Historical Provider, MD   Probiotic Product (PROBIOTIC-10) CAPS Take by mouth daily  Patient not taking: Reported on 2023    Historical Provider, MD   vitamin C (ASCORBIC ACID) 500 MG tablet Take 1 tablet by mouth daily    Historical Provider, MD   Cholecalciferol (VITAMIN D3) 2000 units CAPS Take 1 capsule by mouth daily    Historical Provider, MD   Calcium Carb-Cholecalciferol (CALCIUM 1000 + D) 1000-800 MG-UNIT TABS

## 2023-09-26 NOTE — PROGRESS NOTES
[facility] PRE-ADMISSION TESTING INSTRUCTIONS    The Preadmission Testing patient is instructed accordingly using the following criteria (check applicable):    ARRIVAL INSTRUCTIONS:  [x] Parking the day of Surgery is located in the Main Entrance lot. Upon entering the door, make an immediate right to the surgery reception desk    [x] Bring photo ID and insurance card    [x] Bring in a copy of Living will or Durable Power of  papers. [x] Please be sure to arrange for responsible adult to provide transportation to and from the hospital    [x] Please arrange for responsible adult to be with you for the 24 hour period post procedure due to having anesthesia    [x] If you awake am of surgery not feeling well or have temperature >100 please call [phone]    GENERAL INSTRUCTIONS:    [x] Follow instructions for hydration that have been provided to you at your Pre-Admission Visit. Solid food until midnight then clear liquids. No gum, candy or mints. [x] You may brush your teeth    [x] Stop herbal supplements and vitamins 5 days prior to procedure    [x] No tobacco products within 24 hours of surgery     [x] No alcohol or illegal drug use within 24 hours of surgery.     [x] Jewelry, body piercing's, eyeglasses, contact lenses and dentures are not permitted into surgery (bring cases)      [x] Please do not wear any nail polish, make up or hair products on the day of surgery    [x] You can expect a call the business day prior to procedure to notify you if your arrival time changes    [x] If you receive a survey after surgery we would greatly appreciate your comments    [x] Please notify surgeon if you develop any illness between now and time of surgery (cold, cough, sore throat, fever, nausea, vomiting) or any signs of infections  including skin, wounds, and dental.    [x]  The Outpatient Pharmacy is available to fill your prescription here on your day of surgery, ask your preop

## 2023-09-27 LAB
MICROORGANISM SPEC CULT: NORMAL
SPECIMEN DESCRIPTION: NORMAL

## 2023-10-02 ENCOUNTER — ANESTHESIA (OUTPATIENT)
Dept: OPERATING ROOM | Age: 68
End: 2023-10-02
Payer: MEDICARE

## 2023-10-02 ENCOUNTER — APPOINTMENT (OUTPATIENT)
Dept: GENERAL RADIOLOGY | Age: 68
End: 2023-10-02
Attending: GENERAL ACUTE CARE HOSPITAL
Payer: MEDICARE

## 2023-10-02 ENCOUNTER — TELEPHONE (OUTPATIENT)
Dept: FAMILY MEDICINE CLINIC | Age: 68
End: 2023-10-02

## 2023-10-02 ENCOUNTER — HOSPITAL ENCOUNTER (OUTPATIENT)
Age: 68
Setting detail: OBSERVATION
Discharge: HOME | End: 2023-10-03
Attending: GENERAL ACUTE CARE HOSPITAL | Admitting: GENERAL ACUTE CARE HOSPITAL
Payer: MEDICARE

## 2023-10-02 DIAGNOSIS — M17.11 OSTEOARTHRITIS OF RIGHT KNEE, UNSPECIFIED OSTEOARTHRITIS TYPE: ICD-10-CM

## 2023-10-02 DIAGNOSIS — M17.11 ARTHRITIS OF RIGHT KNEE: Primary | ICD-10-CM

## 2023-10-02 PROCEDURE — 2580000003 HC RX 258: Performed by: GENERAL ACUTE CARE HOSPITAL

## 2023-10-02 PROCEDURE — 6360000002 HC RX W HCPCS: Performed by: ANESTHESIOLOGY

## 2023-10-02 PROCEDURE — 88311 DECALCIFY TISSUE: CPT

## 2023-10-02 PROCEDURE — 6360000002 HC RX W HCPCS: Performed by: NURSE ANESTHETIST, CERTIFIED REGISTERED

## 2023-10-02 PROCEDURE — 3600000004 HC SURGERY LEVEL 4 BASE: Performed by: GENERAL ACUTE CARE HOSPITAL

## 2023-10-02 PROCEDURE — 97161 PT EVAL LOW COMPLEX 20 MIN: CPT

## 2023-10-02 PROCEDURE — 73560 X-RAY EXAM OF KNEE 1 OR 2: CPT

## 2023-10-02 PROCEDURE — 3600000014 HC SURGERY LEVEL 4 ADDTL 15MIN: Performed by: GENERAL ACUTE CARE HOSPITAL

## 2023-10-02 PROCEDURE — C1713 ANCHOR/SCREW BN/BN,TIS/BN: HCPCS | Performed by: GENERAL ACUTE CARE HOSPITAL

## 2023-10-02 PROCEDURE — 3700000000 HC ANESTHESIA ATTENDED CARE: Performed by: GENERAL ACUTE CARE HOSPITAL

## 2023-10-02 PROCEDURE — 6360000002 HC RX W HCPCS: Performed by: GENERAL ACUTE CARE HOSPITAL

## 2023-10-02 PROCEDURE — 6370000000 HC RX 637 (ALT 250 FOR IP): Performed by: GENERAL ACUTE CARE HOSPITAL

## 2023-10-02 PROCEDURE — G0378 HOSPITAL OBSERVATION PER HR: HCPCS

## 2023-10-02 PROCEDURE — 88305 TISSUE EXAM BY PATHOLOGIST: CPT

## 2023-10-02 PROCEDURE — 64447 NJX AA&/STRD FEMORAL NRV IMG: CPT | Performed by: ANESTHESIOLOGY

## 2023-10-02 PROCEDURE — A4216 STERILE WATER/SALINE, 10 ML: HCPCS | Performed by: GENERAL ACUTE CARE HOSPITAL

## 2023-10-02 PROCEDURE — 2580000003 HC RX 258: Performed by: NURSE ANESTHETIST, CERTIFIED REGISTERED

## 2023-10-02 PROCEDURE — 2709999900 HC NON-CHARGEABLE SUPPLY: Performed by: GENERAL ACUTE CARE HOSPITAL

## 2023-10-02 PROCEDURE — 2500000003 HC RX 250 WO HCPCS: Performed by: NURSE ANESTHETIST, CERTIFIED REGISTERED

## 2023-10-02 PROCEDURE — 3700000001 HC ADD 15 MINUTES (ANESTHESIA): Performed by: GENERAL ACUTE CARE HOSPITAL

## 2023-10-02 PROCEDURE — 97530 THERAPEUTIC ACTIVITIES: CPT

## 2023-10-02 PROCEDURE — 97165 OT EVAL LOW COMPLEX 30 MIN: CPT

## 2023-10-02 PROCEDURE — 7100000000 HC PACU RECOVERY - FIRST 15 MIN: Performed by: GENERAL ACUTE CARE HOSPITAL

## 2023-10-02 PROCEDURE — C1776 JOINT DEVICE (IMPLANTABLE): HCPCS | Performed by: GENERAL ACUTE CARE HOSPITAL

## 2023-10-02 PROCEDURE — 2500000003 HC RX 250 WO HCPCS: Performed by: GENERAL ACUTE CARE HOSPITAL

## 2023-10-02 PROCEDURE — 7100000001 HC PACU RECOVERY - ADDTL 15 MIN: Performed by: GENERAL ACUTE CARE HOSPITAL

## 2023-10-02 PROCEDURE — 2720000010 HC SURG SUPPLY STERILE: Performed by: GENERAL ACUTE CARE HOSPITAL

## 2023-10-02 DEVICE — IMPLANTABLE DEVICE: Type: IMPLANTABLE DEVICE | Site: KNEE | Status: FUNCTIONAL

## 2023-10-02 DEVICE — COMPONENT PAT DIA29MM THK8MM KNEE POLY CEM CONVENTIONAL: Type: IMPLANTABLE DEVICE | Site: KNEE | Status: FUNCTIONAL

## 2023-10-02 DEVICE — PSN TIB STM 5 DEG SZ D R: Type: IMPLANTABLE DEVICE | Site: KNEE | Status: FUNCTIONAL

## 2023-10-02 DEVICE — IMPLANTABLE DEVICE
Type: IMPLANTABLE DEVICE | Site: KNEE | Status: FUNCTIONAL
Brand: REFOBACIN® BONE CEMENT R

## 2023-10-02 RX ORDER — MIDAZOLAM HYDROCHLORIDE 1 MG/ML
INJECTION INTRAMUSCULAR; INTRAVENOUS PRN
Status: DISCONTINUED | OUTPATIENT
Start: 2023-10-02 | End: 2023-10-02 | Stop reason: SDUPTHER

## 2023-10-02 RX ORDER — LANOLIN ALCOHOL/MO/W.PET/CERES
9 CREAM (GRAM) TOPICAL NIGHTLY
Status: DISCONTINUED | OUTPATIENT
Start: 2023-10-02 | End: 2023-10-03 | Stop reason: HOSPADM

## 2023-10-02 RX ORDER — SODIUM CHLORIDE 0.9 % (FLUSH) 0.9 %
5-40 SYRINGE (ML) INJECTION EVERY 12 HOURS SCHEDULED
Status: DISCONTINUED | OUTPATIENT
Start: 2023-10-02 | End: 2023-10-02 | Stop reason: HOSPADM

## 2023-10-02 RX ORDER — ASPIRIN 325 MG
325 TABLET, DELAYED RELEASE (ENTERIC COATED) ORAL 2 TIMES DAILY
Status: DISCONTINUED | OUTPATIENT
Start: 2023-10-02 | End: 2023-10-03 | Stop reason: HOSPADM

## 2023-10-02 RX ORDER — SODIUM CHLORIDE 9 MG/ML
INJECTION, SOLUTION INTRAVENOUS PRN
Status: DISCONTINUED | OUTPATIENT
Start: 2023-10-02 | End: 2023-10-02 | Stop reason: HOSPADM

## 2023-10-02 RX ORDER — SODIUM CHLORIDE 9 MG/ML
INJECTION, SOLUTION INTRAVENOUS CONTINUOUS
Status: DISCONTINUED | OUTPATIENT
Start: 2023-10-02 | End: 2023-10-02 | Stop reason: ALTCHOICE

## 2023-10-02 RX ORDER — OXYCODONE HYDROCHLORIDE 5 MG/1
10 TABLET ORAL EVERY 4 HOURS PRN
Status: DISCONTINUED | OUTPATIENT
Start: 2023-10-02 | End: 2023-10-03 | Stop reason: HOSPADM

## 2023-10-02 RX ORDER — SODIUM CHLORIDE 9 MG/ML
INJECTION, SOLUTION INTRAVENOUS CONTINUOUS PRN
Status: DISCONTINUED | OUTPATIENT
Start: 2023-10-02 | End: 2023-10-02 | Stop reason: SDUPTHER

## 2023-10-02 RX ORDER — OXYCODONE HYDROCHLORIDE 5 MG/1
5 TABLET ORAL EVERY 4 HOURS PRN
Status: DISCONTINUED | OUTPATIENT
Start: 2023-10-02 | End: 2023-10-03 | Stop reason: HOSPADM

## 2023-10-02 RX ORDER — SODIUM CHLORIDE 9 MG/ML
INJECTION, SOLUTION INTRAVENOUS PRN
Status: DISCONTINUED | OUTPATIENT
Start: 2023-10-02 | End: 2023-10-03 | Stop reason: HOSPADM

## 2023-10-02 RX ORDER — ANASTROZOLE 1 MG/1
1 TABLET ORAL
Status: DISCONTINUED | OUTPATIENT
Start: 2023-10-02 | End: 2023-10-03 | Stop reason: HOSPADM

## 2023-10-02 RX ORDER — ONDANSETRON 2 MG/ML
4 INJECTION INTRAMUSCULAR; INTRAVENOUS
Status: DISCONTINUED | OUTPATIENT
Start: 2023-10-02 | End: 2023-10-02 | Stop reason: HOSPADM

## 2023-10-02 RX ORDER — ACETAMINOPHEN 500 MG
1000 TABLET ORAL ONCE
Status: COMPLETED | OUTPATIENT
Start: 2023-10-02 | End: 2023-10-02

## 2023-10-02 RX ORDER — SODIUM CHLORIDE 9 MG/ML
INJECTION, SOLUTION INTRAVENOUS CONTINUOUS
Status: DISCONTINUED | OUTPATIENT
Start: 2023-10-02 | End: 2023-10-03 | Stop reason: HOSPADM

## 2023-10-02 RX ORDER — ROPIVACAINE HYDROCHLORIDE 5 MG/ML
30 INJECTION, SOLUTION EPIDURAL; INFILTRATION; PERINEURAL
Status: COMPLETED | OUTPATIENT
Start: 2023-10-02 | End: 2023-10-02

## 2023-10-02 RX ORDER — LIDOCAINE HYDROCHLORIDE 20 MG/ML
INJECTION, SOLUTION EPIDURAL; INFILTRATION; INTRACAUDAL; PERINEURAL PRN
Status: DISCONTINUED | OUTPATIENT
Start: 2023-10-02 | End: 2023-10-02 | Stop reason: SDUPTHER

## 2023-10-02 RX ORDER — SODIUM CHLORIDE 0.9 % (FLUSH) 0.9 %
5-40 SYRINGE (ML) INJECTION PRN
Status: DISCONTINUED | OUTPATIENT
Start: 2023-10-02 | End: 2023-10-02 | Stop reason: HOSPADM

## 2023-10-02 RX ORDER — FENTANYL CITRATE 50 UG/ML
100 INJECTION, SOLUTION INTRAMUSCULAR; INTRAVENOUS ONCE
Status: COMPLETED | OUTPATIENT
Start: 2023-10-02 | End: 2023-10-02

## 2023-10-02 RX ORDER — ROPIVACAINE HYDROCHLORIDE 5 MG/ML
INJECTION, SOLUTION EPIDURAL; INFILTRATION; PERINEURAL
Status: COMPLETED | OUTPATIENT
Start: 2023-10-02 | End: 2023-10-02

## 2023-10-02 RX ORDER — LISINOPRIL 20 MG/1
20 TABLET ORAL DAILY
Status: DISCONTINUED | OUTPATIENT
Start: 2023-10-02 | End: 2023-10-03 | Stop reason: HOSPADM

## 2023-10-02 RX ORDER — BUPIVACAINE HYDROCHLORIDE 7.5 MG/ML
INJECTION, SOLUTION INTRASPINAL PRN
Status: DISCONTINUED | OUTPATIENT
Start: 2023-10-02 | End: 2023-10-02 | Stop reason: SDUPTHER

## 2023-10-02 RX ORDER — DEXAMETHASONE SODIUM PHOSPHATE 10 MG/ML
8 INJECTION, SOLUTION INTRAMUSCULAR; INTRAVENOUS ONCE
Status: DISCONTINUED | OUTPATIENT
Start: 2023-10-02 | End: 2023-10-02 | Stop reason: HOSPADM

## 2023-10-02 RX ORDER — ONDANSETRON 2 MG/ML
INJECTION INTRAMUSCULAR; INTRAVENOUS PRN
Status: DISCONTINUED | OUTPATIENT
Start: 2023-10-02 | End: 2023-10-02 | Stop reason: SDUPTHER

## 2023-10-02 RX ORDER — SODIUM CHLORIDE 0.9 % (FLUSH) 0.9 %
5-40 SYRINGE (ML) INJECTION PRN
Status: DISCONTINUED | OUTPATIENT
Start: 2023-10-02 | End: 2023-10-03 | Stop reason: HOSPADM

## 2023-10-02 RX ORDER — MORPHINE SULFATE 4 MG/ML
4 INJECTION, SOLUTION INTRAMUSCULAR; INTRAVENOUS
Status: DISCONTINUED | OUTPATIENT
Start: 2023-10-02 | End: 2023-10-03 | Stop reason: HOSPADM

## 2023-10-02 RX ORDER — DEXAMETHASONE SODIUM PHOSPHATE 4 MG/ML
INJECTION, SOLUTION INTRA-ARTICULAR; INTRALESIONAL; INTRAMUSCULAR; INTRAVENOUS; SOFT TISSUE PRN
Status: DISCONTINUED | OUTPATIENT
Start: 2023-10-02 | End: 2023-10-02 | Stop reason: SDUPTHER

## 2023-10-02 RX ORDER — FENTANYL CITRATE 50 UG/ML
25 INJECTION, SOLUTION INTRAMUSCULAR; INTRAVENOUS EVERY 5 MIN PRN
Status: DISCONTINUED | OUTPATIENT
Start: 2023-10-02 | End: 2023-10-02 | Stop reason: HOSPADM

## 2023-10-02 RX ORDER — POLYETHYLENE GLYCOL 3350 17 G/17G
17 POWDER, FOR SOLUTION ORAL DAILY
Status: DISCONTINUED | OUTPATIENT
Start: 2023-10-02 | End: 2023-10-03 | Stop reason: HOSPADM

## 2023-10-02 RX ORDER — FENTANYL CITRATE 50 UG/ML
INJECTION, SOLUTION INTRAMUSCULAR; INTRAVENOUS PRN
Status: DISCONTINUED | OUTPATIENT
Start: 2023-10-02 | End: 2023-10-02 | Stop reason: SDUPTHER

## 2023-10-02 RX ORDER — IPRATROPIUM BROMIDE AND ALBUTEROL SULFATE 2.5; .5 MG/3ML; MG/3ML
1 SOLUTION RESPIRATORY (INHALATION)
Status: DISCONTINUED | OUTPATIENT
Start: 2023-10-02 | End: 2023-10-02 | Stop reason: HOSPADM

## 2023-10-02 RX ORDER — ONDANSETRON 2 MG/ML
4 INJECTION INTRAMUSCULAR; INTRAVENOUS EVERY 6 HOURS PRN
Status: DISCONTINUED | OUTPATIENT
Start: 2023-10-02 | End: 2023-10-03 | Stop reason: HOSPADM

## 2023-10-02 RX ORDER — ACETAMINOPHEN 325 MG/1
650 TABLET ORAL EVERY 6 HOURS
Status: DISCONTINUED | OUTPATIENT
Start: 2023-10-02 | End: 2023-10-03 | Stop reason: HOSPADM

## 2023-10-02 RX ORDER — FENTANYL CITRATE 50 UG/ML
INJECTION, SOLUTION INTRAMUSCULAR; INTRAVENOUS
Status: COMPLETED | OUTPATIENT
Start: 2023-10-02 | End: 2023-10-02

## 2023-10-02 RX ORDER — MIDAZOLAM HYDROCHLORIDE 2 MG/2ML
1 INJECTION, SOLUTION INTRAMUSCULAR; INTRAVENOUS EVERY 5 MIN PRN
Status: DISCONTINUED | OUTPATIENT
Start: 2023-10-02 | End: 2023-10-02 | Stop reason: HOSPADM

## 2023-10-02 RX ORDER — DEXTROSE MONOHYDRATE 100 MG/ML
INJECTION, SOLUTION INTRAVENOUS CONTINUOUS PRN
Status: DISCONTINUED | OUTPATIENT
Start: 2023-10-02 | End: 2023-10-02 | Stop reason: HOSPADM

## 2023-10-02 RX ORDER — PROCHLORPERAZINE EDISYLATE 5 MG/ML
5 INJECTION INTRAMUSCULAR; INTRAVENOUS
Status: DISCONTINUED | OUTPATIENT
Start: 2023-10-02 | End: 2023-10-02 | Stop reason: HOSPADM

## 2023-10-02 RX ORDER — MIDAZOLAM HYDROCHLORIDE 1 MG/ML
INJECTION INTRAMUSCULAR; INTRAVENOUS
Status: COMPLETED | OUTPATIENT
Start: 2023-10-02 | End: 2023-10-02

## 2023-10-02 RX ORDER — ONDANSETRON 4 MG/1
4 TABLET, ORALLY DISINTEGRATING ORAL EVERY 8 HOURS PRN
Status: DISCONTINUED | OUTPATIENT
Start: 2023-10-02 | End: 2023-10-03 | Stop reason: HOSPADM

## 2023-10-02 RX ORDER — MORPHINE SULFATE 2 MG/ML
2 INJECTION, SOLUTION INTRAMUSCULAR; INTRAVENOUS
Status: DISCONTINUED | OUTPATIENT
Start: 2023-10-02 | End: 2023-10-03 | Stop reason: HOSPADM

## 2023-10-02 RX ORDER — PROPOFOL 10 MG/ML
INJECTION, EMULSION INTRAVENOUS CONTINUOUS PRN
Status: DISCONTINUED | OUTPATIENT
Start: 2023-10-02 | End: 2023-10-02 | Stop reason: SDUPTHER

## 2023-10-02 RX ORDER — SODIUM CHLORIDE 0.9 % (FLUSH) 0.9 %
5-40 SYRINGE (ML) INJECTION EVERY 12 HOURS SCHEDULED
Status: DISCONTINUED | OUTPATIENT
Start: 2023-10-02 | End: 2023-10-03 | Stop reason: HOSPADM

## 2023-10-02 RX ADMIN — ROPIVACAINE HYDROCHLORIDE 30 ML: 5 INJECTION, SOLUTION EPIDURAL; INFILTRATION; PERINEURAL at 06:36

## 2023-10-02 RX ADMIN — LISINOPRIL 20 MG: 20 TABLET ORAL at 10:52

## 2023-10-02 RX ADMIN — ROPIVACAINE HYDROCHLORIDE 30 ML: 5 INJECTION EPIDURAL; INFILTRATION; PERINEURAL at 06:40

## 2023-10-02 RX ADMIN — MIDAZOLAM HYDROCHLORIDE 1 MG: 1 INJECTION, SOLUTION INTRAMUSCULAR; INTRAVENOUS at 06:36

## 2023-10-02 RX ADMIN — FENTANYL CITRATE 50 MCG: 50 INJECTION, SOLUTION INTRAMUSCULAR; INTRAVENOUS at 06:36

## 2023-10-02 RX ADMIN — ANASTROZOLE 1 MG: 1 TABLET ORAL at 17:55

## 2023-10-02 RX ADMIN — SODIUM CHLORIDE, PRESERVATIVE FREE 10 ML: 5 INJECTION INTRAVENOUS at 22:20

## 2023-10-02 RX ADMIN — WATER 2000 MG: 1 INJECTION INTRAMUSCULAR; INTRAVENOUS; SUBCUTANEOUS at 22:17

## 2023-10-02 RX ADMIN — TRANEXAMIC ACID 1000 MG: 100 INJECTION, SOLUTION INTRAVENOUS at 06:55

## 2023-10-02 RX ADMIN — SODIUM CHLORIDE: 9 INJECTION, SOLUTION INTRAVENOUS at 06:55

## 2023-10-02 RX ADMIN — OXYCODONE HYDROCHLORIDE 10 MG: 5 TABLET ORAL at 17:55

## 2023-10-02 RX ADMIN — PROPOFOL 100 MCG/KG/MIN: 10 INJECTION, EMULSION INTRAVENOUS at 07:09

## 2023-10-02 RX ADMIN — Medication 9 MG: at 22:37

## 2023-10-02 RX ADMIN — SODIUM CHLORIDE: 9 INJECTION, SOLUTION INTRAVENOUS at 11:25

## 2023-10-02 RX ADMIN — BUPIVACAINE HYDROCHLORIDE IN DEXTROSE 12 MG: 7.5 INJECTION, SOLUTION SUBARACHNOID at 07:06

## 2023-10-02 RX ADMIN — OXYCODONE HYDROCHLORIDE 10 MG: 5 TABLET ORAL at 13:01

## 2023-10-02 RX ADMIN — ACETAMINOPHEN 1000 MG: 500 TABLET ORAL at 06:11

## 2023-10-02 RX ADMIN — WATER 2000 MG: 1 INJECTION INTRAMUSCULAR; INTRAVENOUS; SUBCUTANEOUS at 06:55

## 2023-10-02 RX ADMIN — SODIUM CHLORIDE: 9 INJECTION, SOLUTION INTRAVENOUS at 08:39

## 2023-10-02 RX ADMIN — ACETAMINOPHEN 650 MG: 325 TABLET ORAL at 17:55

## 2023-10-02 RX ADMIN — ASPIRIN 325 MG: 325 TABLET, COATED ORAL at 22:19

## 2023-10-02 RX ADMIN — MIDAZOLAM HYDROCHLORIDE 2 MG: 1 INJECTION INTRAMUSCULAR; INTRAVENOUS at 07:33

## 2023-10-02 RX ADMIN — LIDOCAINE HYDROCHLORIDE 100 MG: 20 INJECTION, SOLUTION EPIDURAL; INFILTRATION; INTRACAUDAL; PERINEURAL at 07:09

## 2023-10-02 RX ADMIN — MORPHINE SULFATE 2 MG: 2 INJECTION, SOLUTION INTRAMUSCULAR; INTRAVENOUS at 14:10

## 2023-10-02 RX ADMIN — ONDANSETRON 4 MG: 2 INJECTION INTRAMUSCULAR; INTRAVENOUS at 07:17

## 2023-10-02 RX ADMIN — OXYCODONE HYDROCHLORIDE 10 MG: 5 TABLET ORAL at 22:19

## 2023-10-02 RX ADMIN — HYDROMORPHONE HYDROCHLORIDE 0.5 MG: 0.5 INJECTION, SOLUTION INTRAMUSCULAR; INTRAVENOUS; SUBCUTANEOUS at 10:21

## 2023-10-02 RX ADMIN — TRANEXAMIC ACID 1000 MG: 100 INJECTION, SOLUTION INTRAVENOUS at 10:25

## 2023-10-02 RX ADMIN — DEXAMETHASONE SODIUM PHOSPHATE 10 MG: 4 INJECTION, SOLUTION INTRAMUSCULAR; INTRAVENOUS at 07:17

## 2023-10-02 RX ADMIN — WATER 2000 MG: 1 INJECTION INTRAMUSCULAR; INTRAVENOUS; SUBCUTANEOUS at 14:50

## 2023-10-02 RX ADMIN — ACETAMINOPHEN 650 MG: 325 TABLET ORAL at 11:42

## 2023-10-02 RX ADMIN — MIDAZOLAM 1 MG: 1 INJECTION INTRAMUSCULAR; INTRAVENOUS at 06:36

## 2023-10-02 RX ADMIN — FENTANYL CITRATE 100 MCG: 50 INJECTION, SOLUTION INTRAMUSCULAR; INTRAVENOUS at 07:07

## 2023-10-02 RX ADMIN — WATER 2000 MG: 1 INJECTION INTRAMUSCULAR; INTRAVENOUS; SUBCUTANEOUS at 07:08

## 2023-10-02 RX ADMIN — ASPIRIN 325 MG: 325 TABLET, COATED ORAL at 11:42

## 2023-10-02 ASSESSMENT — PAIN SCALES - GENERAL
PAINLEVEL_OUTOF10: 5
PAINLEVEL_OUTOF10: 4
PAINLEVEL_OUTOF10: 8
PAINLEVEL_OUTOF10: 4
PAINLEVEL_OUTOF10: 3
PAINLEVEL_OUTOF10: 7
PAINLEVEL_OUTOF10: 6
PAINLEVEL_OUTOF10: 3
PAINLEVEL_OUTOF10: 7
PAINLEVEL_OUTOF10: 6
PAINLEVEL_OUTOF10: 3
PAINLEVEL_OUTOF10: 6
PAINLEVEL_OUTOF10: 6

## 2023-10-02 ASSESSMENT — PAIN DESCRIPTION - DESCRIPTORS
DESCRIPTORS: ACHING;DISCOMFORT;SORE
DESCRIPTORS: ACHING;DISCOMFORT
DESCRIPTORS: ACHING;DISCOMFORT;SORE
DESCRIPTORS: ACHING;DISCOMFORT;SORE
DESCRIPTORS: DISCOMFORT
DESCRIPTORS: DISCOMFORT
DESCRIPTORS: ACHING;SORE;TENDER
DESCRIPTORS: ACHING;DISCOMFORT;SORE
DESCRIPTORS: DISCOMFORT
DESCRIPTORS: DISCOMFORT
DESCRIPTORS: ACHING;DISCOMFORT;SORE
DESCRIPTORS: ACHING

## 2023-10-02 ASSESSMENT — PAIN - FUNCTIONAL ASSESSMENT
PAIN_FUNCTIONAL_ASSESSMENT: PREVENTS OR INTERFERES SOME ACTIVE ACTIVITIES AND ADLS
PAIN_FUNCTIONAL_ASSESSMENT: ACTIVITIES ARE NOT PREVENTED
PAIN_FUNCTIONAL_ASSESSMENT: 0-10

## 2023-10-02 ASSESSMENT — PAIN DESCRIPTION - LOCATION
LOCATION: KNEE

## 2023-10-02 ASSESSMENT — PAIN DESCRIPTION - ORIENTATION
ORIENTATION: RIGHT

## 2023-10-02 ASSESSMENT — PAIN DESCRIPTION - PAIN TYPE
TYPE: CHRONIC PAIN;SURGICAL PAIN

## 2023-10-02 NOTE — CARE COORDINATION
Met with patient about diagnosis and discharge plan of care. Post op right TKA. Pt seen in ortho class. [place]. 6 or 12 steps home with rails. Ordered wheeled walker through [facility]. Has wheeled walker, raised toilet seat and tub shower. Plan is home with [facility] home health-orders completed and notified. PCP is Dr Any Lopez.  Will follow-o

## 2023-10-02 NOTE — PROGRESS NOTES
Patient report called to 7th floor RN; family waiting room notified of patient transfer. Hoda Abbott RN.

## 2023-10-02 NOTE — PROGRESS NOTES
4 Eyes Skin Assessment     NAME:  Chon Shipman  YOB: 1955  MEDICAL RECORD NUMBER:  [id number]    The patient is being assessed for  Admission    I agree that at least one RN has performed a thorough Head to Toe Skin Assessment on the patient. ALL assessment sites listed below have been assessed. Areas assessed by both nurses:    Head, Face, Ears, Shoulders, Back, Chest, Arms, Elbows, Hands, Sacrum. Buttock, Coccyx, Ischium, and Legs. Feet and Heels    Patient with surgical incision to right knee w/ Aquacel dressing intact. She also has numerous moles to her body. Does the Patient have a Wound?  No noted wound(s)       Milton Prevention initiated by RN: No  Wound Care Orders initiated by RN: No    Pressure Injury (Stage 3,4, Unstageable, DTI, NWPT, and Complex wounds) if present, place Wound referral order by RN under : No    New Ostomies, if present place, Ostomy referral order under : No     Nurse 1 eSignature: Electronically signed by Yanick Villa RN on 10/2/23 at 3:58 PM EDT    **SHARE this note so that the co-signing nurse can place an eSignature**    Nurse 2 eSignature: Electronically signed by Kelsey Walker RN on 10/2/23 at 6:49 PM EDT

## 2023-10-02 NOTE — ANESTHESIA POSTPROCEDURE EVALUATION
Department of Anesthesiology  Postprocedure Note    Patient: Perla York  MRN: [id number]  YOB: 1955  Date of evaluation: 10/2/2023      Procedure Summary     Date: 10/02/23 Room / Location: [facility] 02 / [facility]    Anesthesia Start: 4218 Anesthesia Stop: 7868    Procedure: RIGHT KNEE TOTAL ARTHROPLASTY + PNB (Right: Knee) Diagnosis:       Osteoarthritis of right knee, unspecified osteoarthritis type      (Osteoarthritis of right knee, unspecified osteoarthritis type [M17.11])    Surgeons: Dimple Tran DO Responsible Provider: Minerva Stephens DO    Anesthesia Type: general, spinal ASA Status: 2          Anesthesia Type: No value filed.     Teresa Phase I:      Teresa Phase II:        Anesthesia Post Evaluation    Patient location during evaluation: PACU  Patient participation: complete - patient participated  Level of consciousness: awake and alert  Pain score: 1  Airway patency: patent  Nausea & Vomiting: no nausea and no vomiting  Complications: no  Cardiovascular status: hemodynamically stable  Respiratory status: acceptable  Pain management: adequate

## 2023-10-02 NOTE — TELEPHONE ENCOUNTER
Isidro Fung called to inform patient admitted, had her ortho surgery  Last seen 9/13/2023  Next appt 1/19/2024
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
## 2023-10-02 NOTE — OP NOTE
tibial cutting block were then removed. I determined that the tibia was a size D. The size D trial was then positioned about the proximal tibia with the knee in flexion. Ensuring appropriate medial to lateral position as well as anterior to posterior position, I then pinned the guide into place. A trial femur component was then placed and impacted in position. I inserted a size 13 polyethylene component and took the knee through range of motion. With the size 13, there was full range of motion including full extension and flexion. There was stability in both varus and valgus stress in both extension and flexion. I was able to perform an anterior drawer test and there is about 1 cm of translation with a firm stable endpoint. The knee was then brought into full extension and a posterior drawer was applied to the distal femur. This was done to break up any remaining adhesions of the posterior cruciate ligament. The patella was once again everted and the metal protector plate was removed. I used the sizing guides to determine that a 29 mm button would be sufficient to provide appropriate coverage. Any remaining osteophytes were removed using a rongeur. The drill guide was then placed, taking care to slightly medialize the component. I drilled the 3 holes within the guide and then placed a patellar button in place. Once again, taking the knee through extension flexion, there was noted to be excellent tracking of the patellar component about the distal femur phalange within the trochlear groove. At this point, all trial instrumentation was then removed. Prior to removing the femur, the lug not holes within the distal femoral condyles were drilled. After removing the polyethylene trial, the drill guide was inserted and used to both drill and then punched the proximal tibia.   I used a Ronnie punch instrument in order to gently perforate a sclerotic area of bone noted about the medial proximal tibia

## 2023-10-02 NOTE — ANESTHESIA PROCEDURE NOTES
Peripheral Block    Patient location during procedure: pre-op  Reason for block: post-op pain management  Start time: 10/2/2023 6:36 AM  End time: 10/2/2023 6:40 AM  Staffing  Performed: anesthesiologist   Performed by: Ruddy Lorenzana DO  Authorized by: Ruddy Lorenzana DO    Preanesthetic Checklist  Completed: patient identified, IV checked, site marked, risks and benefits discussed, surgical/procedural consents, equipment checked, pre-op evaluation, timeout performed, anesthesia consent given, oxygen available, monitors applied/VS acknowledged, fire risk safety assessment completed and verbalized and blood product R/B/A discussed and consented  Peripheral Block   Patient position: supine  Prep: ChloraPrep  Provider prep: sterile gloves  Patient monitoring: cardiac monitor, continuous pulse ox, IV access, oxygen and responsive to questions  Block type: Saphenous  Laterality: right  Injection technique: single-shot  Guidance: ultrasound guided    Needle   Needle type: insulated echogenic nerve stimulator needle   Needle gauge: 22 G  Needle localization: ultrasound guidance  Assessment   Injection assessment: negative aspiration for heme, no paresthesia on injection, local visualized surrounding nerve on ultrasound and no intravascular symptoms  Paresthesia pain: none  Slow fractionated injection: yes  Hemodynamics: stable  Real-time US image taken/store: yes  Outcomes: uncomplicated    Medications Administered  fentaNYL (SUBLIMAZE) injection - IntraVENous   50 mcg - 10/2/2023 6:36:00 AM  midazolam (VERSED) injection 2 mg/2mL - IntraVENous   1 mg - 10/2/2023 6:36:00 AM  ropivacaine (NAROPIN) injection 0.5% - Perineural   30 mL - 10/2/2023 6:36:00 AM
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
## 2023-10-02 NOTE — ANESTHESIA PROCEDURE NOTES
Spinal Block    Patient location during procedure: OR  End time: 10/2/2023 7:06 AM  Reason for block: primary anesthetic and at surgeon's request  Staffing  Performed: resident/CRNA   Anesthesiologist: Tonie Fitzpatrick DO  Resident/CRNA: RENEE De La Garza CRNA  Other anesthesia staff: Jackson Dacosta RN  Performed by: RENEE De La Garza CRNA  Authorized by: Tonie Fitzpatrick DO    Spinal Block  Patient position: sitting  Prep: Betadine and site prepped and draped  Patient monitoring: cardiac monitor, continuous pulse ox, continuous capnometry and frequent blood pressure checks  Approach: midline  Location: L3/L4  Provider prep: mask, sterile gloves and sterile gown  Local infiltration: lidocaine  Needle  Needle type: Pencan   Needle gauge: 25 G  Needle length: 3.5 in  Assessment  Sensory level: T6  Swirl obtained: Yes  CSF: clear  Attempts: 1  Hemodynamics: stable  Preanesthetic Checklist  Completed: patient identified, IV checked, site marked, risks and benefits discussed, surgical/procedural consents, equipment checked, pre-op evaluation, timeout performed, anesthesia consent given, oxygen available and monitors applied/VS acknowledged

## 2023-10-02 NOTE — ACP (ADVANCE CARE PLANNING)
Advance Care Planning   Healthcare Decision Maker:    Primary Decision Maker: Anna Marie collar - [phone]    Click here to complete Healthcare Decision Makers including selection of the Healthcare Decision Maker Relationship (ie \"Primary\").

## 2023-10-02 NOTE — H&P
History and Physical      CHIEF COMPLAINT: Right knee pain    History of Present Illness:  Nino Moss is a 80y.o. year-old female presents for total knee arthroplasty of the right knee. This patient was previously seen in my office, diagnosed with end-stage osteoarthritis of the right knee with x-rays. She failed to improve definitively with nonoperative treatment. Accordingly, we discussed proceeding forward with total knee arthroplasty. Surgery and all pertinent risk, benefits alternative treatment reviewed at length. Forms obtained and verified. Patient is here today for total knee arthroplasty of the right knee. She denies any medical issues or changes from previous. There have been no recent fevers, chills, nausea or vomiting. Past Medical History:   Diagnosis Date    Breast cancer ([address]) 03/2018    NO BP - NO NEEDLES LEFT BREAST    Hypertension     Leg swelling     Migraine headache 03/02/2015    Neuropathy 04/21/2023    Obesity (BMI 30.0-34.9) 03/20/2017         Past Surgical History:   Procedure Laterality Date    BREAST SURGERY Bilateral     biopsies; non malignant    CHOLECYSTECTOMY      DOPPLER ECHOCARDIOGRAPHY      GROIN SURGERY N/A 11/13/2020    EXCISION OF PERINEAL CYST performed by Brandy Trevino MD at [address] [facility], BILATERAL      TONSILLECTOMY         Medications Prior to Admission:    Prior to Admission medications    Medication Sig Start Date End Date Taking?  Authorizing Provider   Calcium-Vitamin D-Vitamin K (VIACTIV CALCIUM PLUS D) 718-21.3-71 MG-MCG-MCG CHEW Take by mouth daily    Historical Provider, MD   anastrozole (ARIMIDEX) 1 MG tablet Take 1 tablet by mouth daily  Patient taking differently: Take 1 tablet by mouth Daily with supper 8/3/23   RENEE Schneider   diclofenac (VOLTAREN) 50 MG EC tablet Take 1 tablet by mouth 2 times daily 7/19/23 7/18/24  Kaden Marshall MD   Handicap Placard MISC by Does not apply route Patient cannot walk 200

## 2023-10-02 NOTE — PROGRESS NOTES
Occupational Therapy    OCCUPATIONAL THERAPY INITIAL EVALUATION    [facility]   [address] [facility], [place]         MNWF:                                                  Patient Name: Jolene Bobby    MRN: [id number]    : 1955    Room: [address] [place] [address]      Evaluating OT: Lidia Payment OTR/L   [id number]      Referring Provider:Sam Torres DO    Specific Provider Orders/Date:OT eval and treat 10/3/2023      Diagnosis:  Osteoarthritis of right knee, unspecified osteoarthritis type [M17.11]  Arthritis of right knee [M17.11]    Surgery: R TKA 10/2/2023     Pertinent Medical History: neuropathy LEs (patient reports from chemo), breast CA, B mastectomy      Precautions:  Fall Risk, WBAT R LE, L UE sensitivity      Assessment of current deficits    [x] Functional mobility  [x]ADLs  [x] Strength               [x]Cognition    [x] Functional transfers   [x] IADLs         [x] Safety Awareness   [x]Endurance    [] Fine Coordination              [x] Balance      [] Vision/perception   []Sensation     []Gross Motor Coordination  [] ROM  [] Delirium                   [] Motor Control     OT PLAN OF CARE   OT POC based on physician orders, patient diagnosis and results of clinical assessment    Frequency/Duration  2-4 days/wk for 2 weeks PRN   Specific OT Treatment Interventions to include:   ADL retraining/adapted techniques and AE recommendations to increase functional independence within precautions                    Energy conservation techniques to improve tolerance for selfcare routine   Functional transfer/mobility training/DME recommendations for increased independence, safety and fall prevention         Patient/family education to increase safety and functional independence             Environmental modifications for safe mobility and completion of ADLs                             Therapeutic activity to improve functional performance during ADLs.

## 2023-10-02 NOTE — PLAN OF CARE
Problem: Discharge Planning  Goal: Discharge to home or other facility with appropriate resources  Outcome: Progressing  Flowsheets (Taken 10/2/2023 1108 by Cooper Mendes RN)  Discharge to home or other facility with appropriate resources: Refer to discharge planning if patient needs post-hospital services based on physician order or complex needs related to functional status, cognitive ability or social support system     Problem: Pain  Goal: Verbalizes/displays adequate comfort level or baseline comfort level  Outcome: Progressing     Problem: Safety - Adult  Goal: Free from fall injury  Outcome: Progressing

## 2023-10-02 NOTE — PROGRESS NOTES
Called Dr. [facility]  [phone]   Informed Patient had Right Knee Surgery with Дмитрий Oneill

## 2023-10-03 VITALS
TEMPERATURE: 97.8 F | HEART RATE: 83 BPM | SYSTOLIC BLOOD PRESSURE: 155 MMHG | WEIGHT: 193 LBS | DIASTOLIC BLOOD PRESSURE: 72 MMHG | BODY MASS INDEX: 32.15 KG/M2 | RESPIRATION RATE: 18 BRPM | OXYGEN SATURATION: 100 % | HEIGHT: 65 IN

## 2023-10-03 LAB
BASOPHILS # BLD: 0.01 K/UL (ref 0–0.2)
BASOPHILS NFR BLD: 0 % (ref 0–2)
EOSINOPHIL # BLD: 0.03 K/UL (ref 0.05–0.5)
EOSINOPHILS RELATIVE PERCENT: 0 % (ref 0–6)
ERYTHROCYTE [DISTWIDTH] IN BLOOD BY AUTOMATED COUNT: 13 % (ref 11.5–15)
HCT VFR BLD AUTO: 33.4 % (ref 34–48)
HGB BLD-MCNC: 10.9 G/DL (ref 11.5–15.5)
IMM GRANULOCYTES # BLD AUTO: 0.03 K/UL (ref 0–0.58)
IMM GRANULOCYTES NFR BLD: 0 % (ref 0–5)
LYMPHOCYTES NFR BLD: 1.41 K/UL (ref 1.5–4)
LYMPHOCYTES RELATIVE PERCENT: 13 % (ref 20–42)
MCH RBC QN AUTO: 30.7 PG (ref 26–35)
MCHC RBC AUTO-ENTMCNC: 32.6 G/DL (ref 32–34.5)
MCV RBC AUTO: 94.1 FL (ref 80–99.9)
MONOCYTES NFR BLD: 0.94 K/UL (ref 0.1–0.95)
MONOCYTES NFR BLD: 9 % (ref 2–12)
NEUTROPHILS NFR BLD: 78 % (ref 43–80)
NEUTS SEG NFR BLD: 8.36 K/UL (ref 1.8–7.3)
PLATELET # BLD AUTO: 219 K/UL (ref 130–450)
PMV BLD AUTO: 10.7 FL (ref 7–12)
RBC # BLD AUTO: 3.55 M/UL (ref 3.5–5.5)
WBC OTHER # BLD: 10.8 K/UL (ref 4.5–11.5)

## 2023-10-03 PROCEDURE — 6370000000 HC RX 637 (ALT 250 FOR IP): Performed by: GENERAL ACUTE CARE HOSPITAL

## 2023-10-03 PROCEDURE — 2580000003 HC RX 258: Performed by: GENERAL ACUTE CARE HOSPITAL

## 2023-10-03 PROCEDURE — 97530 THERAPEUTIC ACTIVITIES: CPT

## 2023-10-03 PROCEDURE — 36415 COLL VENOUS BLD VENIPUNCTURE: CPT

## 2023-10-03 PROCEDURE — 85025 COMPLETE CBC W/AUTO DIFF WBC: CPT

## 2023-10-03 PROCEDURE — G0378 HOSPITAL OBSERVATION PER HR: HCPCS

## 2023-10-03 PROCEDURE — 96374 THER/PROPH/DIAG INJ IV PUSH: CPT

## 2023-10-03 PROCEDURE — 97110 THERAPEUTIC EXERCISES: CPT

## 2023-10-03 PROCEDURE — 6360000002 HC RX W HCPCS: Performed by: GENERAL ACUTE CARE HOSPITAL

## 2023-10-03 RX ORDER — OXYCODONE HYDROCHLORIDE 5 MG/1
5 TABLET ORAL EVERY 4 HOURS PRN
Qty: 30 TABLET | Refills: 0 | Status: SHIPPED | OUTPATIENT
Start: 2023-10-03 | End: 2023-10-10

## 2023-10-03 RX ORDER — ASPIRIN 325 MG
325 TABLET, DELAYED RELEASE (ENTERIC COATED) ORAL 2 TIMES DAILY
Qty: 30 TABLET | Refills: 3 | Status: SHIPPED | OUTPATIENT
Start: 2023-10-03

## 2023-10-03 RX ADMIN — ASPIRIN 325 MG: 325 TABLET, COATED ORAL at 09:33

## 2023-10-03 RX ADMIN — OXYCODONE HYDROCHLORIDE 10 MG: 5 TABLET ORAL at 13:44

## 2023-10-03 RX ADMIN — MORPHINE SULFATE 2 MG: 2 INJECTION, SOLUTION INTRAMUSCULAR; INTRAVENOUS at 06:53

## 2023-10-03 RX ADMIN — LISINOPRIL 20 MG: 20 TABLET ORAL at 09:33

## 2023-10-03 RX ADMIN — SODIUM CHLORIDE, PRESERVATIVE FREE 10 ML: 5 INJECTION INTRAVENOUS at 09:33

## 2023-10-03 RX ADMIN — OXYCODONE HYDROCHLORIDE 10 MG: 5 TABLET ORAL at 09:33

## 2023-10-03 RX ADMIN — POLYETHYLENE GLYCOL 3350 17 G: 17 POWDER, FOR SOLUTION ORAL at 09:34

## 2023-10-03 RX ADMIN — ACETAMINOPHEN 650 MG: 325 TABLET ORAL at 11:34

## 2023-10-03 RX ADMIN — ACETAMINOPHEN 650 MG: 325 TABLET ORAL at 00:48

## 2023-10-03 RX ADMIN — OXYCODONE HYDROCHLORIDE 10 MG: 5 TABLET ORAL at 04:04

## 2023-10-03 RX ADMIN — ACETAMINOPHEN 650 MG: 325 TABLET ORAL at 04:51

## 2023-10-03 ASSESSMENT — PAIN SCALES - GENERAL
PAINLEVEL_OUTOF10: 2
PAINLEVEL_OUTOF10: 4
PAINLEVEL_OUTOF10: 8
PAINLEVEL_OUTOF10: 3
PAINLEVEL_OUTOF10: 7
PAINLEVEL_OUTOF10: 4
PAINLEVEL_OUTOF10: 6
PAINLEVEL_OUTOF10: 5
PAINLEVEL_OUTOF10: 6
PAINLEVEL_OUTOF10: 5
PAINLEVEL_OUTOF10: 7

## 2023-10-03 ASSESSMENT — PAIN DESCRIPTION - ORIENTATION
ORIENTATION: RIGHT

## 2023-10-03 ASSESSMENT — PAIN DESCRIPTION - DESCRIPTORS
DESCRIPTORS: ACHING;DISCOMFORT;SORE
DESCRIPTORS: ACHING;TENDER;SORE
DESCRIPTORS: ACHING
DESCRIPTORS: ACHING;TENDER;SORE
DESCRIPTORS: ACHING;DISCOMFORT;SORE

## 2023-10-03 ASSESSMENT — PAIN - FUNCTIONAL ASSESSMENT
PAIN_FUNCTIONAL_ASSESSMENT: ACTIVITIES ARE NOT PREVENTED
PAIN_FUNCTIONAL_ASSESSMENT: PREVENTS OR INTERFERES SOME ACTIVE ACTIVITIES AND ADLS
PAIN_FUNCTIONAL_ASSESSMENT: PREVENTS OR INTERFERES SOME ACTIVE ACTIVITIES AND ADLS
PAIN_FUNCTIONAL_ASSESSMENT: ACTIVITIES ARE NOT PREVENTED
PAIN_FUNCTIONAL_ASSESSMENT: ACTIVITIES ARE NOT PREVENTED
PAIN_FUNCTIONAL_ASSESSMENT: PREVENTS OR INTERFERES SOME ACTIVE ACTIVITIES AND ADLS
PAIN_FUNCTIONAL_ASSESSMENT: ACTIVITIES ARE NOT PREVENTED

## 2023-10-03 ASSESSMENT — PAIN DESCRIPTION - LOCATION
LOCATION: KNEE

## 2023-10-03 ASSESSMENT — PAIN DESCRIPTION - PAIN TYPE
TYPE: CHRONIC PAIN;SURGICAL PAIN

## 2023-10-03 NOTE — PROGRESS NOTES
Occupational Therapy  OT BEDSIDE TREATMENT NOTE      Date:10/3/2023  Patient Name: Mai Giang  MRN: [id number]  : 1955  Room: [address] [place] [address]     Evaluating OT: Marily Staff OTR/L   [id number]      Referring Provider:Sam Cardenas DO    Specific Provider Orders/Date:OT eval and treat 10/3/2023      Diagnosis:  Osteoarthritis of right knee, unspecified osteoarthritis type [M17.11]  Arthritis of right knee [M17.11]    Surgery: R TKA 10/2/2023     Pertinent Medical History: neuropathy LEs (patient reports from chemo), breast CA, B mastectomy      Precautions:  Fall Risk, WBAT R LE, L UE sensitivity      Assessment of current deficits    [x] Functional mobility  [x]ADLs  [x] Strength               [x]Cognition    [x] Functional transfers   [x] IADLs         [x] Safety Awareness   [x]Endurance    [] Fine Coordination              [x] Balance      [] Vision/perception   []Sensation     []Gross Motor Coordination  [] ROM  [] Delirium                   [] Motor Control     OT PLAN OF CARE   OT POC based on physician orders, patient diagnosis and results of clinical assessment    Frequency/Duration  2-4 days/wk for 2 weeks PRN   Specific OT Treatment Interventions to include:   ADL retraining/adapted techniques and AE recommendations to increase functional independence within precautions                    Energy conservation techniques to improve tolerance for selfcare routine   Functional transfer/mobility training/DME recommendations for increased independence, safety and fall prevention         Patient/family education to increase safety and functional independence             Environmental modifications for safe mobility and completion of ADLs                             Therapeutic activity to improve functional performance during ADLs.                                          Therapeutic exercise to improve tolerance and functional strength for ADLs    Balance retraining/tolerance tasks for facilitation of

## 2023-10-03 NOTE — PROGRESS NOTES
P Quality Flow/Interdisciplinary Rounds Progress Note        Quality Flow Rounds held on October 3, 2023    Disciplines Attending:  Bedside Nurse, , , and Nursing Unit Leadership    Jose Patrick was admitted on 10/2/2023  4:59 AM    Anticipated Discharge Date:       Disposition:    Milton Score:  Milton Scale Score: 20    Readmission Risk              Risk of Unplanned Readmission:  0           Discussed patient goal for the day, patient clinical progression, and barriers to discharge.   The following Goal(s) of the Day/Commitment(s) have been identified:   Discharge planning      Maria Victoria Crews RN  October 3, 2023

## 2023-10-03 NOTE — PLAN OF CARE
Problem: Discharge Planning  Goal: Discharge to home or other facility with appropriate resources  10/3/2023 1723 by Clayton Carballo RN  Outcome: Completed  10/3/2023 1723 by Clayton Carballo RN  Outcome: Adequate for Discharge  10/3/2023 1215 by Clayton Carballo RN  Outcome: Progressing     Problem: Pain  Goal: Verbalizes/displays adequate comfort level or baseline comfort level  10/3/2023 1723 by Clayton Carballo RN  Outcome: Completed  10/3/2023 1723 by Clayton Carballo RN  Outcome: Adequate for Discharge  10/3/2023 1215 by Clayton Carballo RN  Outcome: Progressing     Problem: Safety - Adult  Goal: Free from fall injury  10/3/2023 1723 by Clayton Carballo RN  Outcome: Completed  10/3/2023 1723 by Clayton Carballo RN  Outcome: Adequate for Discharge  10/3/2023 1215 by Clayton Carballo RN  Outcome: Progressing

## 2023-10-03 NOTE — PROGRESS NOTES
1489 - Dr Fabricio Lopez on floor recently to see patient and write prescriptions for discharge. Ryan Taylor RN in room reviewing discharge paperwork with patient. 1728 - Patient left floor via wheelchair in stable condition with all of her personal belongings in her possession.      Beatrice Negrete RN

## 2023-10-03 NOTE — CARE COORDINATION
Updated plan of care. POD#1 right TKA. Has all DME. Plan is home with [facility] health-orders completed.  Plan is home today-[facility]

## 2023-10-05 LAB — SURGICAL PATHOLOGY REPORT: NORMAL

## 2023-10-17 ENCOUNTER — PATIENT MESSAGE (OUTPATIENT)
Dept: FAMILY MEDICINE CLINIC | Age: 68
End: 2023-10-17

## 2023-10-17 NOTE — TELEPHONE ENCOUNTER
From: Ethyl Kanu  To: Dr. Maxine Abbasi  Sent: 10/17/2023 11:43 AM EDT  Subject: Saw orthopedic/question on Gabapentin    Dr. Dorcas Llamas I saw the orthopedic today and knee X-ray is good. Problem is my neuropathy is so severe it is hindering my PT and getting the mobility I need to get. I did not take another script from him for OxyCotin, but want to try this scenario if okay with you. Question: Should I try the Gabapentin prescribed for me from [facility] ER doc when I had BP episode in April? This would be to see if it calms my neuropathy. Bottle says 100mg 3x for 5 days. I would combat any pain with that with Tylenol. I can make an appt or virtual appt with you to prescribe properly and go over my Diclofenac needs too. What are your thoughts on the Gabapentin and making a virtual appt? Pain is bad and hindering recovery. Need to get meds under control. Thank you very much.

## 2023-10-18 ENCOUNTER — TELEMEDICINE (OUTPATIENT)
Dept: FAMILY MEDICINE CLINIC | Age: 68
End: 2023-10-18
Payer: MEDICARE

## 2023-10-18 DIAGNOSIS — I10 PRIMARY HYPERTENSION: ICD-10-CM

## 2023-10-18 DIAGNOSIS — M17.11 ARTHRITIS OF RIGHT KNEE: Primary | ICD-10-CM

## 2023-10-18 DIAGNOSIS — E78.5 DYSLIPIDEMIA (HIGH LDL; LOW HDL): ICD-10-CM

## 2023-10-18 PROCEDURE — 99214 OFFICE O/P EST MOD 30 MIN: CPT | Performed by: FAMILY MEDICINE

## 2023-10-18 PROCEDURE — 1123F ACP DISCUSS/DSCN MKR DOCD: CPT | Performed by: FAMILY MEDICINE

## 2023-10-18 RX ORDER — TRAMADOL HYDROCHLORIDE 50 MG/1
50 TABLET ORAL EVERY 6 HOURS PRN
COMMUNITY

## 2023-10-18 RX ORDER — GABAPENTIN 100 MG/1
100 CAPSULE ORAL 3 TIMES DAILY
Qty: 270 CAPSULE | Refills: 0 | Status: SHIPPED | OUTPATIENT
Start: 2023-10-18 | End: 2024-01-16

## 2023-10-18 NOTE — PROGRESS NOTES
Corey Kapadia, was evaluated through a synchronous (real-time) audio-video encounter. The patient (or guardian if applicable) is aware that this is a billable service, which includes applicable co-pays. This Virtual Visit was conducted with patient's (and/or legal guardian's) consent. Patient identification was verified, and a caregiver was present when appropriate. The patient was located at Home: [facility]  Provider was located at Facility (Appt Dept): [address] [place] [address],  [address] [place] [address]      Kam Barnett (:  1955) is a Established patient, presenting virtually for evaluation of the following:    Assessment & Plan   Below is the assessment and plan developed based on review of pertinent history, physical exam, labs, studies, and medications. 1. Arthritis of right knee  Status post right total knee arthroplasty  Pain not well controlled and limiting her ability to participate in PT  Had previously been taking diclofenac 50 mg twice daily, has not resumed yet  For now, trial gabapentin  To continue daily anticoagulant aspirin per surgical recommendation  Likely to be able to stop aspirin in a few weeks and at that time I am okay with her resuming diclofenac as well  -     gabapentin (NEURONTIN) 100 MG capsule; Take 1 capsule by mouth 3 times daily for 90 days. Intended supply: 90 days, Disp-270 capsule, R-0Normal  2. Primary hypertension  Has had home readings that were elevated in the 150s due to pain  Continue lisinopril 20 mg  We will await further pain control prior to adjusting medications    3. Dyslipidemia (high LDL)  Not well controlled based on recent labs with an LDL of 127  ASCVD score greater than 10%  Currently taking omega-3 fatty acids  Consider starting statin medication at next visit    Scheduled with PCP    Subjective   HPI  Chronic leg pain/neuropathy:  Started on gabapentin in ER. Never started it. Had 10/2/23 R knee TKA.  Having worsening pain and

## 2023-10-29 ASSESSMENT — PROMIS GLOBAL HEALTH SCALE
HOW IS THE PROMIS V1.1 BEING ADMINISTERED?: 2
SUM OF RESPONSES TO QUESTIONS 2, 4, 5, & 10: 10
IN THE PAST 7 DAYS, HOW WOULD YOU RATE YOUR FATIGUE ON AVERAGE [ON A SCALE FROM 1 (NONE) TO 5 (VERY SEVERE)]?: 3
IN GENERAL, HOW WOULD YOU RATE YOUR SATISFACTION WITH YOUR SOCIAL ACTIVITIES AND RELATIONSHIPS [ON A SCALE OF 1 (POOR) TO 5 (EXCELLENT)]?: 2
TO WHAT EXTENT ARE YOU ABLE TO CARRY OUT YOUR EVERYDAY PHYSICAL ACTIVITIES SUCH AS WALKING, CLIMBING STAIRS, CARRYING GROCERIES, OR MOVING A CHAIR [ON A SCALE OF 1 (NOT AT ALL) TO 5 (COMPLETELY)]?: 3
IN GENERAL, PLEASE RATE HOW WELL YOU CARRY OUT YOUR USUAL SOCIAL ACTIVITIES (INCLUDES ACTIVITIES AT HOME, AT WORK, AND IN YOUR COMMUNITY, AND RESPONSIBILITIES AS A PARENT, CHILD, SPOUSE, EMPLOYEE, FRIEND, ETC) [ON A SCALE OF 1 (POOR) TO 5 (EXCELLENT)]?: 2
IN GENERAL, HOW WOULD YOU RATE YOUR MENTAL HEALTH, INCLUDING YOUR MOOD AND YOUR ABILITY TO THINK [ON A SCALE OF 1 (POOR) TO 5 (EXCELLENT)]?: 3
IN THE PAST 7 DAYS, HOW WOULD YOU RATE YOUR PAIN ON AVERAGE [ON A SCALE FROM 0 (NO PAIN) TO 10 (WORST IMAGINABLE PAIN)]?: 5
IN THE PAST 7 DAYS, HOW OFTEN HAVE YOU BEEN BOTHERED BY EMOTIONAL PROBLEMS, SUCH AS FEELING ANXIOUS, DEPRESSED, OR IRRITABLE [ON A SCALE FROM 1 (NEVER) TO 5 (ALWAYS)]?: 3
SUM OF RESPONSES TO QUESTIONS 3, 6, 7, & 8: 13
IN GENERAL, HOW WOULD YOU RATE YOUR PHYSICAL HEALTH [ON A SCALE OF 1 (POOR) TO 5 (EXCELLENT)]?: 2
IN GENERAL, WOULD YOU SAY YOUR HEALTH IS...[ON A SCALE OF 1 (POOR) TO 5 (EXCELLENT)]: 3
IN GENERAL, WOULD YOU SAY YOUR QUALITY OF LIFE IS...[ON A SCALE OF 1 (POOR) TO 5 (EXCELLENT)]: 2

## 2023-10-29 ASSESSMENT — KOOS JR
BENDING TO THE FLOOR TO PICK UP OBJECT: 2
GOING UP OR DOWN STAIRS: 2
KOOS JR TOTAL INTERVAL SCORE: 59.381
TWISING OR PIVOTING ON KNEE: 1
STRAIGHTENING KNEE FULLY: 1
HOW SEVERE IS YOUR KNEE STIFFNESS AFTER FIRST WAKING IN MORNING: 3
STANDING UPRIGHT: 1
RISING FROM SITTING: 1

## 2023-12-04 ENCOUNTER — TELEPHONE (OUTPATIENT)
Dept: FAMILY MEDICINE CLINIC | Age: 68
End: 2023-12-04

## 2023-12-04 DIAGNOSIS — M17.11 ARTHRITIS OF RIGHT KNEE: ICD-10-CM

## 2023-12-04 RX ORDER — GABAPENTIN 100 MG/1
100 CAPSULE ORAL 3 TIMES DAILY
Qty: 270 CAPSULE | Refills: 3 | Status: SHIPPED
Start: 2023-12-04 | End: 2023-12-05 | Stop reason: SDUPTHER

## 2023-12-04 NOTE — TELEPHONE ENCOUNTER
Patient called for a refill of Gabapentin which she increased to 200 mg TID, as advised by Dr. Leon Cota on 10/25/23. Please advise.      Last seen 10/18/2023  Next appt 1/23/2024  Elba Galarza/Romero

## 2023-12-05 DIAGNOSIS — M17.11 ARTHRITIS OF RIGHT KNEE: ICD-10-CM

## 2023-12-05 RX ORDER — GABAPENTIN 100 MG/1
200 CAPSULE ORAL 3 TIMES DAILY
Qty: 540 CAPSULE | Refills: 3 | Status: SHIPPED | OUTPATIENT
Start: 2023-12-05 | End: 2024-12-06

## 2023-12-05 NOTE — TELEPHONE ENCOUNTER
Patient called about gabapentin Rx. Pt was informed Rx was sent in but it was sent in as the 100 mg 3 times daily and it needs to be the gabapentin 200 mg 3 times daily. Pt stated that really helped her. please advise.

## 2024-01-29 DIAGNOSIS — Z17.0 MALIGNANT NEOPLASM OF UPPER-OUTER QUADRANT OF LEFT BREAST IN FEMALE, ESTROGEN RECEPTOR POSITIVE (HCC): Primary | ICD-10-CM

## 2024-01-29 DIAGNOSIS — C50.412 MALIGNANT NEOPLASM OF UPPER-OUTER QUADRANT OF LEFT BREAST IN FEMALE, ESTROGEN RECEPTOR POSITIVE (HCC): Primary | ICD-10-CM

## 2024-01-30 DIAGNOSIS — Z76.0 ENCOUNTER FOR MEDICATION REFILL: ICD-10-CM

## 2024-01-30 DIAGNOSIS — M17.0 BILATERAL PRIMARY OSTEOARTHRITIS OF KNEE: ICD-10-CM

## 2024-01-30 NOTE — TELEPHONE ENCOUNTER
Requested Prescriptions     Pending Prescriptions Disp Refills    diclofenac (VOLTAREN) 50 MG EC tablet 180 tablet 3     Sig: Take 1 tablet by mouth 2 times daily       Next appt is 7/23/2024  Last appt was 10/18/2023

## 2024-02-05 ENCOUNTER — OFFICE VISIT (OUTPATIENT)
Dept: ONCOLOGY | Age: 69
End: 2024-02-05
Payer: MEDICARE

## 2024-02-05 ENCOUNTER — HOSPITAL ENCOUNTER (OUTPATIENT)
Dept: INFUSION THERAPY | Age: 69
Discharge: HOME | End: 2024-02-05
Payer: MEDICARE

## 2024-02-05 VITALS
SYSTOLIC BLOOD PRESSURE: 186 MMHG | BODY MASS INDEX: 31.96 KG/M2 | WEIGHT: 191.8 LBS | HEART RATE: 73 BPM | HEIGHT: 65 IN | DIASTOLIC BLOOD PRESSURE: 87 MMHG | OXYGEN SATURATION: 98 % | TEMPERATURE: 98.2 F

## 2024-02-05 DIAGNOSIS — C50.412 MALIGNANT NEOPLASM OF UPPER-OUTER QUADRANT OF LEFT BREAST IN FEMALE, ESTROGEN RECEPTOR POSITIVE (HCC): Primary | ICD-10-CM

## 2024-02-05 DIAGNOSIS — Z17.0 MALIGNANT NEOPLASM OF UPPER-OUTER QUADRANT OF LEFT BREAST IN FEMALE, ESTROGEN RECEPTOR POSITIVE (HCC): Primary | ICD-10-CM

## 2024-02-05 DIAGNOSIS — M17.0 BILATERAL PRIMARY OSTEOARTHRITIS OF KNEE: ICD-10-CM

## 2024-02-05 DIAGNOSIS — Z76.0 ENCOUNTER FOR MEDICATION REFILL: ICD-10-CM

## 2024-02-05 DIAGNOSIS — Z85.3 PERSONAL HISTORY OF BREAST CANCER: ICD-10-CM

## 2024-02-05 DIAGNOSIS — Z79.811 USE OF AROMATASE INHIBITORS: Primary | ICD-10-CM

## 2024-02-05 LAB
ALBUMIN SERPL-MCNC: 4.5 G/DL (ref 3.5–5.2)
ALP SERPL-CCNC: 123 U/L (ref 35–104)
ALT SERPL-CCNC: 24 U/L (ref 0–32)
ANION GAP SERPL CALCULATED.3IONS-SCNC: 8 MMOL/L (ref 7–16)
AST SERPL-CCNC: 18 U/L (ref 0–31)
BASOPHILS # BLD: 0.04 K/UL (ref 0–0.2)
BASOPHILS NFR BLD: 1 % (ref 0–2)
BILIRUB SERPL-MCNC: 0.2 MG/DL (ref 0–1.2)
BUN SERPL-MCNC: 14 MG/DL (ref 6–23)
CALCIUM SERPL-MCNC: 10.1 MG/DL (ref 8.6–10.2)
CHLORIDE SERPL-SCNC: 104 MMOL/L (ref 98–107)
CO2 SERPL-SCNC: 29 MMOL/L (ref 22–29)
CREAT SERPL-MCNC: 0.6 MG/DL (ref 0.5–1)
EOSINOPHIL # BLD: 0.16 K/UL (ref 0.05–0.5)
EOSINOPHILS RELATIVE PERCENT: 3 % (ref 0–6)
ERYTHROCYTE [DISTWIDTH] IN BLOOD BY AUTOMATED COUNT: 13 % (ref 11.5–15)
GFR SERPL CREATININE-BSD FRML MDRD: >60 ML/MIN/1.73M2
GLUCOSE SERPL-MCNC: 100 MG/DL (ref 74–99)
HCT VFR BLD AUTO: 40.1 % (ref 34–48)
HGB BLD-MCNC: 13 G/DL (ref 11.5–15.5)
IMM GRANULOCYTES # BLD AUTO: <0.03 K/UL (ref 0–0.58)
IMM GRANULOCYTES NFR BLD: 0 % (ref 0–5)
LYMPHOCYTES NFR BLD: 1.23 K/UL (ref 1.5–4)
LYMPHOCYTES RELATIVE PERCENT: 20 % (ref 20–42)
MCH RBC QN AUTO: 29.9 PG (ref 26–35)
MCHC RBC AUTO-ENTMCNC: 32.4 G/DL (ref 32–34.5)
MCV RBC AUTO: 92.2 FL (ref 80–99.9)
MONOCYTES NFR BLD: 0.45 K/UL (ref 0.1–0.95)
MONOCYTES NFR BLD: 7 % (ref 2–12)
NEUTROPHILS NFR BLD: 70 % (ref 43–80)
NEUTS SEG NFR BLD: 4.4 K/UL (ref 1.8–7.3)
PLATELET # BLD AUTO: 248 K/UL (ref 130–450)
PMV BLD AUTO: 10.6 FL (ref 7–12)
POTASSIUM SERPL-SCNC: 3.7 MMOL/L (ref 3.5–5)
PROT SERPL-MCNC: 7.7 G/DL (ref 6.4–8.3)
RBC # BLD AUTO: 4.35 M/UL (ref 3.5–5.5)
SODIUM SERPL-SCNC: 141 MMOL/L (ref 132–146)
WBC OTHER # BLD: 6.3 K/UL (ref 4.5–11.5)

## 2024-02-05 PROCEDURE — 3077F SYST BP >= 140 MM HG: CPT | Performed by: CLINICAL NURSE SPECIALIST

## 2024-02-05 PROCEDURE — 6360000002 HC RX W HCPCS: Performed by: CLINICAL NURSE SPECIALIST

## 2024-02-05 PROCEDURE — 85025 COMPLETE CBC W/AUTO DIFF WBC: CPT

## 2024-02-05 PROCEDURE — 3079F DIAST BP 80-89 MM HG: CPT | Performed by: CLINICAL NURSE SPECIALIST

## 2024-02-05 PROCEDURE — 99213 OFFICE O/P EST LOW 20 MIN: CPT | Performed by: CLINICAL NURSE SPECIALIST

## 2024-02-05 PROCEDURE — 1123F ACP DISCUSS/DSCN MKR DOCD: CPT | Performed by: CLINICAL NURSE SPECIALIST

## 2024-02-05 PROCEDURE — 99213 OFFICE O/P EST LOW 20 MIN: CPT

## 2024-02-05 PROCEDURE — 80053 COMPREHEN METABOLIC PANEL: CPT

## 2024-02-05 PROCEDURE — 96372 THER/PROPH/DIAG INJ SC/IM: CPT

## 2024-02-05 PROCEDURE — 36415 COLL VENOUS BLD VENIPUNCTURE: CPT

## 2024-02-05 RX ORDER — ANASTROZOLE 1 MG/1
1 TABLET ORAL DAILY
Qty: 90 TABLET | Refills: 2 | Status: SHIPPED | OUTPATIENT
Start: 2024-02-05

## 2024-02-05 RX ADMIN — DENOSUMAB 60 MG: 60 INJECTION SUBCUTANEOUS at 15:17

## 2024-02-05 NOTE — PROGRESS NOTES
Department of [facility] Med Oncology      Attending Clinic Note    Reason for Visit:  Follow-up on a patient with Left Breast Cancer    PCP:  Sri Gage MD    History of Present Illness:  68 y.o. female status post Left simple mastectomy/SLNB and right prophylactic mastectomy on 04/09/2018.   Left multifocal ER >90%/NV 90%, HER2 negative (0). 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 pT2 pN1a    Right breast ALH    4/20/18 s/p Left ALND  Left axilla, lymph node dissection - One of three lymph nodes, positive for metastatic carcinoma (1/3).   - Size of largest metastatic deposit: 3.5 mm.   - No extracapsular extension identified.   - Fibroadipose tissue and skeletal muscle with changes consistent with prior surgery site    The final pathologic stage is mpT2 pN1a.    Stage IIB: (T2, N1, M0)  S/p adjuvant chemotherapy Dose dense AC x4, followed by Taxol x 4 completed 08/28/2018.  RT was completed on 11/15/2018.  Baseline DEXA 10/18 osteopenia.  Currently on Arimidex since Nov 2018.    Today 2/5/2024 ; No fever, chills. Fair appetite. Mild fatigue. Moderate  arthralgias.  Peripheral neuropathy    Review of Systems;  CONSTITUTIONAL: No fever, chills. Fair appetite. Mild fatigue.  ENMT: Eyes: No diplopia; Nose:No epistaxis. Mouth: No sore throat.  RESPIRATORY: No hemoptysis, shortness of breath.   CARDIOVASCULAR: No chest pain, palpitations.  GASTROINTESTINAL: No nausea/vomiting, abdominal pain.  MSK: Worsening arthralgias  GENITOURINARY: No dysuria, urinary frequency, hematuria.  NEURO: No syncope, presyncope, headache.  Remainder:  ROS NEGATIVE    Past Medical History:      Diagnosis Date    Breast cancer (HCC) 03/2018    NO BP - NO NEEDLES LEFT BREAST

## 2024-02-05 NOTE — TELEPHONE ENCOUNTER
Pt called for refill.  After reviewing the chart the pt had order from 2023. I spoke to the pharmacy and she stated since pt never got it filled as they were using an old RX  The order has  so a new Rx needs sent in. Please advise  Last seen 10/18/2023  Next appt 2024  [facility]

## 2024-02-05 NOTE — PROGRESS NOTES
Patient did stop at check out window, ok'd to leave without AVS.  Patient has MYCHART.  Patient aware to arrive @ 09:00 am. for labs same day first.

## 2024-02-22 ENCOUNTER — HOSPITAL ENCOUNTER (OUTPATIENT)
Dept: GENERAL RADIOLOGY | Age: 69
Discharge: HOME | End: 2024-02-24
Payer: MEDICARE

## 2024-02-22 VITALS — BODY MASS INDEX: 28.93 KG/M2 | WEIGHT: 180 LBS | HEIGHT: 66 IN

## 2024-02-22 DIAGNOSIS — Z85.3 PERSONAL HISTORY OF BREAST CANCER: ICD-10-CM

## 2024-02-22 DIAGNOSIS — Z79.811 USE OF AROMATASE INHIBITORS: ICD-10-CM

## 2024-02-22 PROCEDURE — 76642 ULTRASOUND BREAST LIMITED: CPT

## 2024-02-22 PROCEDURE — 77080 DXA BONE DENSITY AXIAL: CPT

## 2024-04-11 ENCOUNTER — TELEPHONE (OUTPATIENT)
Dept: INFUSION THERAPY | Age: 69
End: 2024-04-11

## 2024-04-11 NOTE — TELEPHONE ENCOUNTER
Dexa scan and US Breast results reviewed per Agustina ROCK NP.  Patient notified of results and states understanding. ( No abnormality seen in US breast) and (Osteopenia on Dexa Scan to continue vitamins). Patient states understanding.

## 2024-05-12 DIAGNOSIS — I10 PRIMARY HYPERTENSION: ICD-10-CM

## 2024-05-12 RX ORDER — LISINOPRIL 20 MG/1
TABLET ORAL
Qty: 90 TABLET | Refills: 0 | Status: SHIPPED | OUTPATIENT
Start: 2024-05-12 | End: 2024-08-12

## 2024-07-16 DIAGNOSIS — Z17.0 MALIGNANT NEOPLASM OF UPPER-OUTER QUADRANT OF LEFT BREAST IN FEMALE, ESTROGEN RECEPTOR POSITIVE (HCC): ICD-10-CM

## 2024-07-16 DIAGNOSIS — C50.412 MALIGNANT NEOPLASM OF UPPER-OUTER QUADRANT OF LEFT BREAST IN FEMALE, ESTROGEN RECEPTOR POSITIVE (HCC): ICD-10-CM

## 2024-07-16 LAB
ALBUMIN: 4.3 G/DL (ref 3.5–5.2)
ALP BLD-CCNC: 109 U/L (ref 35–104)
ALT SERPL-CCNC: 21 U/L (ref 0–32)
ANION GAP SERPL CALCULATED.3IONS-SCNC: 8 MMOL/L (ref 7–16)
AST SERPL-CCNC: 19 U/L (ref 0–31)
BASOPHILS ABSOLUTE: 0.04 K/UL (ref 0–0.2)
BASOPHILS RELATIVE PERCENT: 1 % (ref 0–2)
BILIRUB SERPL-MCNC: 0.3 MG/DL (ref 0–1.2)
BUN BLDV-MCNC: 14 MG/DL (ref 6–23)
CALCIUM SERPL-MCNC: 10 MG/DL (ref 8.6–10.2)
CHLORIDE BLD-SCNC: 105 MMOL/L (ref 98–107)
CO2: 28 MMOL/L (ref 22–29)
CREAT SERPL-MCNC: 0.6 MG/DL (ref 0.5–1)
EOSINOPHILS ABSOLUTE: 0.12 K/UL (ref 0.05–0.5)
EOSINOPHILS RELATIVE PERCENT: 2 % (ref 0–6)
GFR, ESTIMATED: >90 ML/MIN/1.73M2
GLUCOSE BLD-MCNC: 107 MG/DL (ref 74–99)
HCT VFR BLD CALC: 41.9 % (ref 34–48)
HEMOGLOBIN: 13.5 G/DL (ref 11.5–15.5)
IMMATURE GRANULOCYTES %: 0 % (ref 0–5)
IMMATURE GRANULOCYTES ABSOLUTE: <0.03 K/UL (ref 0–0.58)
LYMPHOCYTES ABSOLUTE: 0.97 K/UL (ref 1.5–4)
LYMPHOCYTES RELATIVE PERCENT: 17 % (ref 20–42)
MCH RBC QN AUTO: 30.4 PG (ref 26–35)
MCHC RBC AUTO-ENTMCNC: 32.2 G/DL (ref 32–34.5)
MCV RBC AUTO: 94.4 FL (ref 80–99.9)
MONOCYTES ABSOLUTE: 0.42 K/UL (ref 0.1–0.95)
MONOCYTES RELATIVE PERCENT: 7 % (ref 2–12)
NEUTROPHILS ABSOLUTE: 4.14 K/UL (ref 1.8–7.3)
NEUTROPHILS RELATIVE PERCENT: 72 % (ref 43–80)
PDW BLD-RTO: 13.1 % (ref 11.5–15)
PLATELET # BLD: 244 K/UL (ref 130–450)
PMV BLD AUTO: 11.1 FL (ref 7–12)
POTASSIUM SERPL-SCNC: 4.8 MMOL/L (ref 3.5–5)
RBC # BLD: 4.44 M/UL (ref 3.5–5.5)
SODIUM BLD-SCNC: 141 MMOL/L (ref 132–146)
TOTAL PROTEIN: 7.6 G/DL (ref 6.4–8.3)
WBC # BLD: 5.7 K/UL (ref 4.5–11.5)

## 2024-07-22 ASSESSMENT — LIFESTYLE VARIABLES
HOW MANY STANDARD DRINKS CONTAINING ALCOHOL DO YOU HAVE ON A TYPICAL DAY: 0
HOW OFTEN DO YOU HAVE A DRINK CONTAINING ALCOHOL: 1
HOW OFTEN DO YOU HAVE SIX OR MORE DRINKS ON ONE OCCASION: 1

## 2024-07-23 ENCOUNTER — OFFICE VISIT (OUTPATIENT)
Dept: FAMILY MEDICINE CLINIC | Age: 69
End: 2024-07-23
Payer: MEDICARE

## 2024-07-23 VITALS
BODY MASS INDEX: 29.73 KG/M2 | OXYGEN SATURATION: 96 % | SYSTOLIC BLOOD PRESSURE: 136 MMHG | DIASTOLIC BLOOD PRESSURE: 78 MMHG | RESPIRATION RATE: 16 BRPM | TEMPERATURE: 97.9 F | HEIGHT: 66 IN | HEART RATE: 63 BPM | WEIGHT: 185 LBS

## 2024-07-23 DIAGNOSIS — T45.1X5A CHEMOTHERAPY-INDUCED NEUROPATHY (HCC): ICD-10-CM

## 2024-07-23 DIAGNOSIS — M17.0 BILATERAL PRIMARY OSTEOARTHRITIS OF KNEE: ICD-10-CM

## 2024-07-23 DIAGNOSIS — Z76.0 ENCOUNTER FOR MEDICATION REFILL: ICD-10-CM

## 2024-07-23 DIAGNOSIS — R73.9 HYPERGLYCEMIA: ICD-10-CM

## 2024-07-23 DIAGNOSIS — Z00.00 MEDICARE ANNUAL WELLNESS VISIT, SUBSEQUENT: Primary | ICD-10-CM

## 2024-07-23 DIAGNOSIS — M17.11 ARTHRITIS OF RIGHT KNEE: ICD-10-CM

## 2024-07-23 DIAGNOSIS — Z17.0 MALIGNANT NEOPLASM OF UPPER-OUTER QUADRANT OF LEFT BREAST IN FEMALE, ESTROGEN RECEPTOR POSITIVE (HCC): ICD-10-CM

## 2024-07-23 DIAGNOSIS — G62.0 CHEMOTHERAPY-INDUCED NEUROPATHY (HCC): ICD-10-CM

## 2024-07-23 DIAGNOSIS — Z91.81 AT RISK FOR FALLING: ICD-10-CM

## 2024-07-23 DIAGNOSIS — I10 PRIMARY HYPERTENSION: ICD-10-CM

## 2024-07-23 DIAGNOSIS — C50.412 MALIGNANT NEOPLASM OF UPPER-OUTER QUADRANT OF LEFT BREAST IN FEMALE, ESTROGEN RECEPTOR POSITIVE (HCC): ICD-10-CM

## 2024-07-23 LAB — HBA1C MFR BLD: 5.1 %

## 2024-07-23 PROCEDURE — 1123F ACP DISCUSS/DSCN MKR DOCD: CPT | Performed by: FAMILY MEDICINE

## 2024-07-23 PROCEDURE — 99214 OFFICE O/P EST MOD 30 MIN: CPT | Performed by: FAMILY MEDICINE

## 2024-07-23 PROCEDURE — 3078F DIAST BP <80 MM HG: CPT | Performed by: FAMILY MEDICINE

## 2024-07-23 PROCEDURE — 83036 HEMOGLOBIN GLYCOSYLATED A1C: CPT | Performed by: FAMILY MEDICINE

## 2024-07-23 PROCEDURE — 3075F SYST BP GE 130 - 139MM HG: CPT | Performed by: FAMILY MEDICINE

## 2024-07-23 PROCEDURE — G0439 PPPS, SUBSEQ VISIT: HCPCS | Performed by: FAMILY MEDICINE

## 2024-07-23 RX ORDER — GABAPENTIN 100 MG/1
100 CAPSULE ORAL 2 TIMES DAILY
Qty: 180 CAPSULE | Refills: 3 | Status: SHIPPED | OUTPATIENT
Start: 2024-07-23 | End: 2025-07-25

## 2024-07-23 RX ORDER — LISINOPRIL 20 MG/1
TABLET ORAL
Qty: 90 TABLET | Refills: 3 | Status: SHIPPED | OUTPATIENT
Start: 2024-07-23 | End: 2025-07-23

## 2024-07-23 NOTE — PATIENT INSTRUCTIONS

## 2024-07-23 NOTE — PROGRESS NOTES
Medicare Annual Wellness Visit    Lashawnagata Kapadia is here for     Chief Complaint   Patient presents with    Medicare AWV    Discuss Labs        Assessment & Plan     Medicare annual wellness visit, subsequent    At risk for falling: Patient uses cane; currently declines other interventions    Malignant neoplasm of upper-outer quadrant of left breast in female, estrogen receptor positive (HCC)  Assessment & Plan:   Monitored by specialist- no acute findings meriting change in the plan    Hyperglycemia: Patient is very concerned about the (mild) increase of glucose  -     POCT glycosylated hemoglobin (Hb A1C)    Encounter for medication refill  -     diclofenac (VOLTAREN) 50 MG EC tablet; Take 1 tablet by mouth daily as needed for Pain, Disp-90 tablet, R-27/23/24: DISREGARD PREVIOUS PRESCRIPTIONS AND REFILLS; HONOR THIS PRESCRIPTION AND REFILLSNormal    Primary hypertension  -     lisinopril (PRINIVIL;ZESTRIL) 20 MG tablet; TAKE ONE TABLET BY MOUTH DAILY FOR HIGH BLOOD PRESSURE DISORDER, Disp-90 tablet, R-37/23/24: DISREGARD PREVIOUS PRESCRIPTIONS AND REFILLS; HONOR THIS PRESCRIPTION AND REFILLSNormal    Bilateral primary osteoarthritis of knee: Patient inquired about decreasing diclofenac now that knee is replaced  -     Decrease diclofenac (VOLTAREN) 50 MG EC tablet; Take 1 tablet by mouth daily as needed for Pain, Disp-90 tablet, R-27/23/24: DISREGARD PREVIOUS PRESCRIPTIONS AND REFILLS; HONOR THIS PRESCRIPTION AND REFILLSNormal    Arthritis of right knee/chemotherapy Neuropathy: Patient inquired about decreasing gabapentin dose  -     Decrease gabapentin (NEURONTIN) 100 MG capsule; Take 1 capsule by mouth 2 times daily. Intended supply: 90 days, Disp-180 capsule, R-3Normal      Recommendations for Preventive Services Due: see orders and patient instructions/AVS.  Recommended screening schedule for the next 5-10 years is provided to the patient in written form: see Patient Instructions/AVS.    Follow

## 2024-08-14 ENCOUNTER — HOSPITAL ENCOUNTER (OUTPATIENT)
Dept: INFUSION THERAPY | Age: 69
Discharge: HOME | End: 2024-08-14
Payer: MEDICARE

## 2024-08-14 ENCOUNTER — OFFICE VISIT (OUTPATIENT)
Dept: ONCOLOGY | Age: 69
End: 2024-08-14

## 2024-08-14 VITALS
BODY MASS INDEX: 29.86 KG/M2 | HEART RATE: 69 BPM | DIASTOLIC BLOOD PRESSURE: 78 MMHG | OXYGEN SATURATION: 97 % | SYSTOLIC BLOOD PRESSURE: 182 MMHG | TEMPERATURE: 97.6 F | WEIGHT: 185 LBS

## 2024-08-14 DIAGNOSIS — Z85.3 PERSONAL HISTORY OF BREAST CANCER: ICD-10-CM

## 2024-08-14 DIAGNOSIS — M81.0 OSTEOPOROSIS, UNSPECIFIED OSTEOPOROSIS TYPE, UNSPECIFIED PATHOLOGICAL FRACTURE PRESENCE: Primary | ICD-10-CM

## 2024-08-14 DIAGNOSIS — Z17.0 MALIGNANT NEOPLASM OF UPPER-OUTER QUADRANT OF LEFT BREAST IN FEMALE, ESTROGEN RECEPTOR POSITIVE (HCC): Primary | ICD-10-CM

## 2024-08-14 DIAGNOSIS — C50.412 MALIGNANT NEOPLASM OF UPPER-OUTER QUADRANT OF LEFT BREAST IN FEMALE, ESTROGEN RECEPTOR POSITIVE (HCC): Primary | ICD-10-CM

## 2024-08-14 DIAGNOSIS — Z79.811 USE OF AROMATASE INHIBITORS: ICD-10-CM

## 2024-08-14 LAB
ALBUMIN SERPL-MCNC: 4.3 G/DL (ref 3.5–5.2)
ALP SERPL-CCNC: 119 U/L (ref 35–104)
ALT SERPL-CCNC: 22 U/L (ref 0–32)
ANION GAP SERPL CALCULATED.3IONS-SCNC: 9 MMOL/L (ref 7–16)
AST SERPL-CCNC: 17 U/L (ref 0–31)
BASOPHILS # BLD: 0.03 K/UL (ref 0–0.2)
BASOPHILS NFR BLD: 1 % (ref 0–2)
BILIRUB SERPL-MCNC: 0.2 MG/DL (ref 0–1.2)
BUN SERPL-MCNC: 16 MG/DL (ref 6–23)
CALCIUM SERPL-MCNC: 10.1 MG/DL (ref 8.6–10.2)
CHLORIDE SERPL-SCNC: 108 MMOL/L (ref 98–107)
CO2 SERPL-SCNC: 29 MMOL/L (ref 22–29)
CREAT SERPL-MCNC: 0.6 MG/DL (ref 0.5–1)
EOSINOPHIL # BLD: 0.11 K/UL (ref 0.05–0.5)
EOSINOPHILS RELATIVE PERCENT: 2 % (ref 0–6)
ERYTHROCYTE [DISTWIDTH] IN BLOOD BY AUTOMATED COUNT: 12.8 % (ref 11.5–15)
GFR, ESTIMATED: >90 ML/MIN/1.73M2
GLUCOSE SERPL-MCNC: 101 MG/DL (ref 74–99)
HCT VFR BLD AUTO: 39.5 % (ref 34–48)
HGB BLD-MCNC: 12.8 G/DL (ref 11.5–15.5)
IMM GRANULOCYTES # BLD AUTO: 0.04 K/UL (ref 0–0.58)
IMM GRANULOCYTES NFR BLD: 1 % (ref 0–5)
LYMPHOCYTES NFR BLD: 0.88 K/UL (ref 1.5–4)
LYMPHOCYTES RELATIVE PERCENT: 16 % (ref 20–42)
MCH RBC QN AUTO: 30.4 PG (ref 26–35)
MCHC RBC AUTO-ENTMCNC: 32.4 G/DL (ref 32–34.5)
MCV RBC AUTO: 93.8 FL (ref 80–99.9)
MONOCYTES NFR BLD: 0.4 K/UL (ref 0.1–0.95)
MONOCYTES NFR BLD: 7 % (ref 2–12)
NEUTROPHILS NFR BLD: 73 % (ref 43–80)
NEUTS SEG NFR BLD: 3.98 K/UL (ref 1.8–7.3)
PLATELET # BLD AUTO: 232 K/UL (ref 130–450)
PMV BLD AUTO: 10.8 FL (ref 7–12)
POTASSIUM SERPL-SCNC: 5.3 MMOL/L (ref 3.5–5)
PROT SERPL-MCNC: 7.4 G/DL (ref 6.4–8.3)
RBC # BLD AUTO: 4.21 M/UL (ref 3.5–5.5)
SODIUM SERPL-SCNC: 146 MMOL/L (ref 132–146)
WBC OTHER # BLD: 5.4 K/UL (ref 4.5–11.5)

## 2024-08-14 PROCEDURE — 96372 THER/PROPH/DIAG INJ SC/IM: CPT

## 2024-08-14 PROCEDURE — 85025 COMPLETE CBC W/AUTO DIFF WBC: CPT

## 2024-08-14 PROCEDURE — 6360000002 HC RX W HCPCS: Performed by: CLINICAL NURSE SPECIALIST

## 2024-08-14 PROCEDURE — 80053 COMPREHEN METABOLIC PANEL: CPT

## 2024-08-14 PROCEDURE — 36415 COLL VENOUS BLD VENIPUNCTURE: CPT

## 2024-08-14 RX ADMIN — DENOSUMAB 60 MG: 60 INJECTION SUBCUTANEOUS at 11:38

## 2024-08-14 NOTE — PROGRESS NOTES
Spoke with patient about following up with PCP for elevated potassium of 5.3 per Melody BLACK.  Patient verbalized understanding.

## 2024-08-14 NOTE — PROGRESS NOTES
Department of [facility] Med Oncology      Attending Clinic Note    Reason for Visit:  Follow-up on a patient with Left Breast Cancer    PCP:  Sri Gage MD    History of Present Illness:  69 y.o. female status post Left simple mastectomy/SLNB and right prophylactic mastectomy on 04/09/2018.   Left multifocal ER >90%/MA 90%, HER2 negative (0). 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 pT2 pN1a    Right breast ALH    4/20/18 s/p Left ALND  Left axilla, lymph node dissection - One of three lymph nodes, positive for metastatic carcinoma (1/3).   - Size of largest metastatic deposit: 3.5 mm.   - No extracapsular extension identified.   - Fibroadipose tissue and skeletal muscle with changes consistent with prior surgery site    The final pathologic stage is mpT2 pN1a.    Stage IIB: (T2, N1, M0)  S/p adjuvant chemotherapy Dose dense AC x4, followed by Taxol x 4 completed 08/28/2018.  RT was completed on 11/15/2018.  Baseline DEXA 10/18 osteopenia.  Currently on Arimidex since Nov 2018.    Today 8/14/2024, patient feeling better than she felt in a long time. Denies fatigue, fever, or new lumps. No worsening of arthralgias or peripheral neuropathy.     Review of Systems;  CONSTITUTIONAL: No fever, chills. Fair appetite. Mild fatigue.  ENMT: Eyes: No diplopia; Nose:No epistaxis. Mouth: No sore throat.  RESPIRATORY: No hemoptysis, shortness of breath.   CARDIOVASCULAR: No chest pain, palpitations.  GASTROINTESTINAL: No nausea/vomiting, abdominal pain.  MSK: Worsening arthralgias  GENITOURINARY: No dysuria, urinary frequency, hematuria.  NEURO: No syncope, presyncope, headache.  Remainder:  ROS NEGATIVE    Past Medical History:      Diagnosis Date    Breast cancer

## 2024-12-31 ENCOUNTER — OFFICE VISIT (OUTPATIENT)
Dept: FAMILY MEDICINE CLINIC | Age: 69
End: 2024-12-31
Payer: MEDICARE

## 2024-12-31 VITALS
HEIGHT: 66 IN | OXYGEN SATURATION: 98 % | BODY MASS INDEX: 29.73 KG/M2 | TEMPERATURE: 98.2 F | WEIGHT: 185 LBS | DIASTOLIC BLOOD PRESSURE: 86 MMHG | SYSTOLIC BLOOD PRESSURE: 160 MMHG | HEART RATE: 66 BPM | RESPIRATION RATE: 16 BRPM

## 2024-12-31 DIAGNOSIS — J32.9 SINOBRONCHITIS: Primary | ICD-10-CM

## 2024-12-31 DIAGNOSIS — H66.001 NON-RECURRENT ACUTE SUPPURATIVE OTITIS MEDIA OF RIGHT EAR WITHOUT SPONTANEOUS RUPTURE OF TYMPANIC MEMBRANE: ICD-10-CM

## 2024-12-31 DIAGNOSIS — J40 SINOBRONCHITIS: Primary | ICD-10-CM

## 2024-12-31 PROCEDURE — 99213 OFFICE O/P EST LOW 20 MIN: CPT | Performed by: NURSE PRACTITIONER

## 2024-12-31 PROCEDURE — 1159F MED LIST DOCD IN RCRD: CPT | Performed by: NURSE PRACTITIONER

## 2024-12-31 PROCEDURE — 3079F DIAST BP 80-89 MM HG: CPT | Performed by: NURSE PRACTITIONER

## 2024-12-31 PROCEDURE — 3077F SYST BP >= 140 MM HG: CPT | Performed by: NURSE PRACTITIONER

## 2024-12-31 PROCEDURE — 1160F RVW MEDS BY RX/DR IN RCRD: CPT | Performed by: NURSE PRACTITIONER

## 2024-12-31 PROCEDURE — 1123F ACP DISCUSS/DSCN MKR DOCD: CPT | Performed by: NURSE PRACTITIONER

## 2024-12-31 NOTE — PROGRESS NOTES
24  Lashawn Kapadia : 1955 Sex: female  Age 69 y.o.    Subjective:  Chief Complaint   Patient presents with    Cough     2 x weeks OTC not working    Congestion     Chest congestion 2 x weeks       HPI:   Lashawn Kapadia , 69 y.o. female presents to the clinic for evaluation of cough x 2 weeks. The patient also reports sinus congestion, right ear discomfort. The patient has taken Coricidin HBP for symptoms. The patient reports unchanged symptoms over time. The patient denies known ill exposure. The patient denies headache, sore throat, rash, and fever. The patient also denies chest pain, abdominal pain, shortness of breath, wheezing, and nausea / vomiting / diarrhea.    ROS:   Unless otherwise stated in this report the patient's positive and negative responses for review of systems for constitutional, eyes, ENT, cardiovascular, respiratory, gastrointestinal, neurological, , musculoskeletal, and integument systems and related systems to the presenting problem are either stated in the history of present illness or were not pertinent or were negative for the symptoms and/or complaints related to the presenting medical problem.  Positives and pertinent negatives as per HPI.  All others reviewed and are negative.      PMH:     Past Medical History:   Diagnosis Date    Breast cancer (HCC) 2018    NO BP - NO NEEDLES LEFT BREAST    History of therapeutic radiation     Hx antineoplastic chemo     Hypertension     Leg swelling     Migraine headache 2015    Neuropathy 2023    Obesity (BMI 30.0-34.9) 2017       Past Surgical History:   Procedure Laterality Date    BREAST SURGERY Bilateral     biopsies; non malignant    CHOLECYSTECTOMY      DOPPLER ECHOCARDIOGRAPHY      GROIN SURGERY N/A 2020    EXCISION OF PERINEAL CYST performed by Marizol Lloyd MD at [facility] OR    MASTECTOMY, BILATERAL      TONSILLECTOMY      TOTAL KNEE ARTHROPLASTY Right 10/2/2023    RIGHT KNEE TOTAL ARTHROPLASTY

## 2025-01-17 DIAGNOSIS — R05.2 SUBACUTE COUGH: Primary | ICD-10-CM

## 2025-01-17 RX ORDER — DEXTROMETHORPHAN HYDROBROMIDE AND PROMETHAZINE HYDROCHLORIDE 15; 6.25 MG/5ML; MG/5ML
5 SYRUP ORAL 4 TIMES DAILY PRN
Qty: 140 ML | Refills: 0 | Status: SHIPPED | OUTPATIENT
Start: 2025-01-17 | End: 2025-01-24

## 2025-02-17 ENCOUNTER — OFFICE VISIT (OUTPATIENT)
Dept: ONCOLOGY | Age: 70
End: 2025-02-17
Payer: MEDICARE

## 2025-02-17 ENCOUNTER — HOSPITAL ENCOUNTER (OUTPATIENT)
Dept: INFUSION THERAPY | Age: 70
Discharge: HOME | End: 2025-02-17
Payer: MEDICARE

## 2025-02-17 VITALS
HEIGHT: 66 IN | BODY MASS INDEX: 29.68 KG/M2 | TEMPERATURE: 98.2 F | SYSTOLIC BLOOD PRESSURE: 179 MMHG | DIASTOLIC BLOOD PRESSURE: 83 MMHG | OXYGEN SATURATION: 97 % | WEIGHT: 184.7 LBS | HEART RATE: 72 BPM

## 2025-02-17 DIAGNOSIS — C50.412 MALIGNANT NEOPLASM OF UPPER-OUTER QUADRANT OF LEFT BREAST IN FEMALE, ESTROGEN RECEPTOR POSITIVE (HCC): Primary | ICD-10-CM

## 2025-02-17 DIAGNOSIS — Z85.3 PERSONAL HISTORY OF BREAST CANCER: ICD-10-CM

## 2025-02-17 DIAGNOSIS — Z17.0 MALIGNANT NEOPLASM OF UPPER-OUTER QUADRANT OF LEFT BREAST IN FEMALE, ESTROGEN RECEPTOR POSITIVE (HCC): Primary | ICD-10-CM

## 2025-02-17 LAB
ALBUMIN SERPL-MCNC: 4.9 G/DL (ref 3.5–5.2)
ALP SERPL-CCNC: 113 U/L (ref 35–104)
ALT SERPL-CCNC: 25 U/L (ref 0–32)
ANION GAP SERPL CALCULATED.3IONS-SCNC: 9 MMOL/L (ref 7–16)
AST SERPL-CCNC: 21 U/L (ref 0–31)
BASOPHILS # BLD: 0.04 K/UL (ref 0–0.2)
BASOPHILS NFR BLD: 1 % (ref 0–2)
BILIRUB SERPL-MCNC: 0.3 MG/DL (ref 0–1.2)
BUN SERPL-MCNC: 15 MG/DL (ref 6–23)
CALCIUM SERPL-MCNC: 10 MG/DL (ref 8.6–10.2)
CHLORIDE SERPL-SCNC: 105 MMOL/L (ref 98–107)
CO2 SERPL-SCNC: 29 MMOL/L (ref 22–29)
CREAT SERPL-MCNC: 0.6 MG/DL (ref 0.5–1)
EOSINOPHIL # BLD: 0.12 K/UL (ref 0.05–0.5)
EOSINOPHILS RELATIVE PERCENT: 2 % (ref 0–6)
ERYTHROCYTE [DISTWIDTH] IN BLOOD BY AUTOMATED COUNT: 12.6 % (ref 11.5–15)
GFR, ESTIMATED: >90 ML/MIN/1.73M2
GLUCOSE SERPL-MCNC: 97 MG/DL (ref 74–99)
HCT VFR BLD AUTO: 40.7 % (ref 34–48)
HGB BLD-MCNC: 13.3 G/DL (ref 11.5–15.5)
IMM GRANULOCYTES # BLD AUTO: <0.03 K/UL (ref 0–0.58)
IMM GRANULOCYTES NFR BLD: 0 % (ref 0–5)
LYMPHOCYTES NFR BLD: 0.99 K/UL (ref 1.5–4)
LYMPHOCYTES RELATIVE PERCENT: 16 % (ref 20–42)
MCH RBC QN AUTO: 30.3 PG (ref 26–35)
MCHC RBC AUTO-ENTMCNC: 32.7 G/DL (ref 32–34.5)
MCV RBC AUTO: 92.7 FL (ref 80–99.9)
MONOCYTES NFR BLD: 0.42 K/UL (ref 0.1–0.95)
MONOCYTES NFR BLD: 7 % (ref 2–12)
NEUTROPHILS NFR BLD: 74 % (ref 43–80)
NEUTS SEG NFR BLD: 4.43 K/UL (ref 1.8–7.3)
PLATELET # BLD AUTO: 270 K/UL (ref 130–450)
PMV BLD AUTO: 10.6 FL (ref 7–12)
POTASSIUM SERPL-SCNC: 4.6 MMOL/L (ref 3.5–5)
PROT SERPL-MCNC: 7.9 G/DL (ref 6.4–8.3)
RBC # BLD AUTO: 4.39 M/UL (ref 3.5–5.5)
SODIUM SERPL-SCNC: 143 MMOL/L (ref 132–146)
WBC OTHER # BLD: 6 K/UL (ref 4.5–11.5)

## 2025-02-17 PROCEDURE — 36415 COLL VENOUS BLD VENIPUNCTURE: CPT

## 2025-02-17 PROCEDURE — 80053 COMPREHEN METABOLIC PANEL: CPT

## 2025-02-17 PROCEDURE — 1126F AMNT PAIN NOTED NONE PRSNT: CPT | Performed by: CLINICAL NURSE SPECIALIST

## 2025-02-17 PROCEDURE — 99213 OFFICE O/P EST LOW 20 MIN: CPT

## 2025-02-17 PROCEDURE — 3077F SYST BP >= 140 MM HG: CPT | Performed by: CLINICAL NURSE SPECIALIST

## 2025-02-17 PROCEDURE — 1159F MED LIST DOCD IN RCRD: CPT | Performed by: CLINICAL NURSE SPECIALIST

## 2025-02-17 PROCEDURE — 96372 THER/PROPH/DIAG INJ SC/IM: CPT

## 2025-02-17 PROCEDURE — 3079F DIAST BP 80-89 MM HG: CPT | Performed by: CLINICAL NURSE SPECIALIST

## 2025-02-17 PROCEDURE — 1123F ACP DISCUSS/DSCN MKR DOCD: CPT | Performed by: CLINICAL NURSE SPECIALIST

## 2025-02-17 PROCEDURE — 6360000002 HC RX W HCPCS: Performed by: CLINICAL NURSE SPECIALIST

## 2025-02-17 PROCEDURE — 99213 OFFICE O/P EST LOW 20 MIN: CPT | Performed by: CLINICAL NURSE SPECIALIST

## 2025-02-17 PROCEDURE — 1160F RVW MEDS BY RX/DR IN RCRD: CPT | Performed by: CLINICAL NURSE SPECIALIST

## 2025-02-17 PROCEDURE — 85025 COMPLETE CBC W/AUTO DIFF WBC: CPT

## 2025-02-17 RX ORDER — LORATADINE 10 MG/1
10 TABLET ORAL DAILY
COMMUNITY

## 2025-02-17 RX ORDER — ANASTROZOLE 1 MG/1
1 TABLET ORAL DAILY
Qty: 90 TABLET | Refills: 2 | Status: SHIPPED | OUTPATIENT
Start: 2025-02-17

## 2025-02-17 RX ADMIN — DENOSUMAB 60 MG: 60 INJECTION SUBCUTANEOUS at 10:59

## 2025-02-17 NOTE — PROGRESS NOTES
Department of [facility] Med Oncology      Attending Clinic Note    Reason for Visit:  Follow-up on a patient with Left Breast Cancer    PCP:  Sri Gage MD    History of Present Illness:  69 y.o. female status post Left simple mastectomy/SLNB and right prophylactic mastectomy on 04/09/2018.   Left multifocal ER >90%/AZ 90%, HER2 negative (0). 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 pT2 pN1a    Right breast ALH    4/20/18 s/p Left ALND  Left axilla, lymph node dissection - One of three lymph nodes, positive for metastatic carcinoma (1/3).   - Size of largest metastatic deposit: 3.5 mm.   - No extracapsular extension identified.   - Fibroadipose tissue and skeletal muscle with changes consistent with prior surgery site    The final pathologic stage is mpT2 pN1a.    Stage IIB: (T2, N1, M0)  S/p adjuvant chemotherapy Dose dense AC x4, followed by Taxol x 4 completed 08/28/2018.  RT was completed on 11/15/2018.  Baseline DEXA 10/18 osteopenia.  Currently on Arimidex since Nov 2018.    Today 2/17/2025 , patient feeling well. Denies fatigue, fever, or new lumps. No worsening of arthralgias or peripheral neuropathy.     Review of Systems;  CONSTITUTIONAL: No fever, chills. Fair appetite. Mild fatigue.  ENMT: Eyes: No diplopia; Nose:No epistaxis. Mouth: No sore throat.  RESPIRATORY: No hemoptysis, shortness of breath.   CARDIOVASCULAR: No chest pain, palpitations.  GASTROINTESTINAL: No nausea/vomiting, abdominal pain.  MSK: Worsening arthralgias  GENITOURINARY: No dysuria, urinary frequency, hematuria.  NEURO: No syncope, presyncope, headache.  Remainder:  ROS NEGATIVE    Past Medical History:      Diagnosis Date    Breast cancer (HCC) 03/2018    NO BP - NO

## 2025-02-18 ENCOUNTER — TELEPHONE (OUTPATIENT)
Dept: OCCUPATIONAL THERAPY | Age: 70
End: 2025-02-18

## 2025-02-18 NOTE — TELEPHONE ENCOUNTER
Therapist received referral for lymphedema clinic.  Therapist called patient to schedule appointment and required to leave voice message for patient to return call to [facility] at [phone] or [facility] at [phone]

## 2025-02-19 DIAGNOSIS — T45.1X5A CHEMOTHERAPY-INDUCED NEUROPATHY: ICD-10-CM

## 2025-02-19 DIAGNOSIS — G62.0 CHEMOTHERAPY-INDUCED NEUROPATHY: ICD-10-CM

## 2025-02-19 DIAGNOSIS — M17.11 ARTHRITIS OF RIGHT KNEE: ICD-10-CM

## 2025-02-19 RX ORDER — GABAPENTIN 100 MG/1
100 CAPSULE ORAL 2 TIMES DAILY
Qty: 180 CAPSULE | Refills: 3 | Status: SHIPPED | OUTPATIENT
Start: 2025-02-19 | End: 2026-02-21

## 2025-03-05 ENCOUNTER — HOSPITAL ENCOUNTER (OUTPATIENT)
Dept: OCCUPATIONAL THERAPY | Age: 70
Setting detail: THERAPIES SERIES
Discharge: HOME | End: 2025-03-05
Payer: MEDICARE

## 2025-03-05 PROCEDURE — 97165 OT EVAL LOW COMPLEX 30 MIN: CPT | Performed by: OCCUPATIONAL THERAPIST

## 2025-03-05 NOTE — PROGRESS NOTES
Occupational Therapy      OCCUPATIONAL THERAPY INITIAL LYMPHEDEMA EVALUATION    [place] [address] [facility]  [address]   Phone: [phone]  Fax: [phone]     Date:  3/5/2025  Initial Evaluation Date: 2025     Evaluating Therapist: RACHEL Romo/L, CLT-MOISÉS    Patient Name:  Lashawn Kapadia    :  1955    Restrictions/Precautions:   fall risk  Diagnosis:  History of breast cancer (Z85.3)       Date of Surgery/Injury: n/a    Insurance/Certification information:  Medical [place] Medicare Advantage       [id number]  Plan of care signed (Y/N): N  Visit# / total visits: Evaluation    Referring Practitioner:  Agustina Jones APRN                               NPI:  [id number]  Specific Practitioner Orders: Evaluation and Treatment    Assessment of current deficits   []Pain  []Skin Integrity   [x]Lymphedema   []Functional transfers/mobility   []ADLs   []Strength    []Cognition  []IADLs   []Safety Awareness   []  Motor Endurance    []Fine Motor Coordination   []Balance   []Vision/perception  []Sensation []Gross Motor Coordination  [x]ROM     OT PLAN OF CARE   OT POC based on physician orders, patient diagnosis and results of clinical assessment    Frequency/Duration:  1-2x per week for 12 treatment sessions from 2025 through 2025  Specific OT Treatment to include:     Plan of Care:     [x]97140-Manual Lymph Drainage and Combined Decongestive Therapy  [x]01604- Skilled Multilayer Short Stretch Compression Bandaging/ Therapeutic Exercise  [x]Skin Care Education [x]HEP including Self MLD Education and/or Self Bandaging  [x]Education for Lymphedema Risks/Precautions     [x] Caregiver Education   []other:      Patient Specific Goal: to make left side stronger, decrease pain      STG: 3 weeks   Patient will demonstrate knowledge and understanding for lymphedema precautions, skin care and self management to decrease progression of lymphedema and risk of

## 2025-03-12 ENCOUNTER — HOSPITAL ENCOUNTER (OUTPATIENT)
Dept: OCCUPATIONAL THERAPY | Age: 70
Setting detail: THERAPIES SERIES
Discharge: HOME | End: 2025-03-12
Payer: MEDICARE

## 2025-03-12 PROCEDURE — 97110 THERAPEUTIC EXERCISES: CPT | Performed by: OCCUPATIONAL THERAPIST

## 2025-03-12 PROCEDURE — 97535 SELF CARE MNGMENT TRAINING: CPT | Performed by: OCCUPATIONAL THERAPIST

## 2025-03-12 NOTE — PROGRESS NOTES
garment wear schedule, self compression bandaging, HEP and self care.    2.  Patient will be fitted for appropriate compression garments for long term management of lymphedema.    3.   Patient will present with decreased limb volume in the involved extremity from trace to none  for improved functional mobility.     4.  Patient will increase active/passive shoulder flexion/abduction by 15 degrees to decrease pain and increase range of motion in order to increase independence and safety during daily life tasks.         Restrictions/Precautions:  [] No blood pressure/blood draw in: [] Left Upper Extremity     [] Right Upper Extremity        [] Fall Risk       Intervention:   []Other    Pain:  [] No    [] Yes  Location:  Pain Rating (0-10 pain scale):  Pain Description:  Interruption of Treatment [] Yes  [] No    Came to Clinic:  [] Bandaged    []Unbandaged    [] With Stocking     [] With Sleeve     [] Kinesiotaped    [] Unna Boot    [] With Alternative Compression:    Skin Integrity:  [] Normal [] Dry  []Rough []Moist []Rash  Location of problem area/s:  [] Wound: Location/Description   [] Fibrosis: Location/Description  [] Keratosis: Location/Description  [] Papillomas: Location/Description  [] Other    Color:  [] Normal [] Mottled [] Flushed [] Other/Description  Location of problem area/s:    Edema:  Edema noted along left flank and lateral left abdomin    Subjective:  patient attended treatment session with spouse who waited in waiting area    Objective:  [] Measurement []Manual Lymph Drainage  []Bandaging   []Kinesiotaping [x] Education    []Self Massage   []Self Bandaging [x] Exercise    []Wound Care  []Hygiene  [] Other        Assessment:  Knowledge of home program:   [] Good [] Fair  [] Poor  Patient is programming at home:             [] Yes  [] No  Family is assisting with programming: [] Yes  [] No  Home programming is consistent:             [] Yes  [] No  Other:   Response to treatment:  good  Instructions

## 2025-03-18 ENCOUNTER — TELEPHONE (OUTPATIENT)
Dept: FAMILY MEDICINE CLINIC | Age: 70
End: 2025-03-18

## 2025-03-18 DIAGNOSIS — E78.5 DYSLIPIDEMIA (HIGH LDL; LOW HDL): ICD-10-CM

## 2025-03-18 DIAGNOSIS — I10 PRIMARY HYPERTENSION: ICD-10-CM

## 2025-03-18 DIAGNOSIS — R73.9 HYPERGLYCEMIA: Primary | ICD-10-CM

## 2025-03-18 DIAGNOSIS — E55.9 VITAMIN D INSUFFICIENCY: ICD-10-CM

## 2025-03-18 NOTE — TELEPHONE ENCOUNTER
Patient returned the call to RS her appt w/Dr. Gage.  She asked if she can have lab orders for her appt.  Patient stated she hasn't had a Lipid in awhile and would like to have this checked.    Last seen 7/23/2024  Next appt 6/17/2025

## 2025-03-21 ENCOUNTER — HOSPITAL ENCOUNTER (OUTPATIENT)
Dept: OCCUPATIONAL THERAPY | Age: 70
Setting detail: THERAPIES SERIES
Discharge: HOME | End: 2025-03-21
Payer: MEDICARE

## 2025-03-21 PROCEDURE — 97535 SELF CARE MNGMENT TRAINING: CPT | Performed by: OCCUPATIONAL THERAPIST

## 2025-03-21 PROCEDURE — 97140 MANUAL THERAPY 1/> REGIONS: CPT | Performed by: OCCUPATIONAL THERAPIST

## 2025-03-21 NOTE — PROGRESS NOTES
Occupational Therapy        OCCUPATIONAL THERAPY PROGRESS NOTE  [place] [address] [address] [facility]  [address]              Phone: [phone]             Fax: [phone]     Date:  3/21/2025  Initial Evaluation Date: 2025     Evaluating Therapist: RACHEL Romo/L, CLT-MOISÉS    Patient Name:  Lashawn Kapadia    :  1955    Restrictions/Precautions:   fall risk  Diagnosis:  History of breast cancer (Z85.3)         Date of Surgery/Injury: n/a    Insurance/Certification information:  Medical [place] Medicare Advantage [id number]   Plan of care signed (Y/N): N  Visit# / total visits: Evaluation +Visit #2    Referring Practitioner:  Agustina Jones APRN NPI: [id number]   Specific Practitioner Orders: Evaluation and Treatment    Assessment of current deficits   []Pain  []Skin Integrity   [x]Lymphedema   []Functional transfers/mobility   []ADLs   []Strength    []Cognition  []IADLs   []Safety Awareness   []  Motor Endurance    []Fine Motor Coordination   []Balance   []Vision/perception  []Sensation []Gross Motor Coordination  [x]ROM     OT PLAN OF CARE   OT POC based on physician orders, patient diagnosis and results of clinical assessment    Frequency/Duration:  1-2x per week for 12 treatment sessions from 2025 through 2025   Specific OT Treatment to include:     Plan of Care:     [x]97140-Manual Lymph Drainage and Combined Decongestive Therapy  [x]02760- Skilled Multilayer Short Stretch Compression Bandaging/ Therapeutic Exercise  [x]Skin Care Education [x]HEP including Self MLD Education and/or Self Bandaging  [x]Education for Lymphedema Risks/Precautions     [x] Caregiver Education   []other:      Patient Specific Goal: to make left side stronger, decrease pain    STG: 3 weeks   Patient will demonstrate knowledge and understanding for lymphedema precautions, skin care and self management to decrease progression of lymphedema and risk of infection.  Therapist

## 2025-03-28 ENCOUNTER — HOSPITAL ENCOUNTER (OUTPATIENT)
Dept: OCCUPATIONAL THERAPY | Age: 70
Setting detail: THERAPIES SERIES
Discharge: HOME | End: 2025-03-28
Payer: MEDICARE

## 2025-03-28 NOTE — PROGRESS NOTES
Occupational Therapy  OCCUPATIONAL THERAPY UPDATE/PROGRESS NOTE  [place] [address] [facility]  [address]              Phone: [phone]             Fax: [phone]     Date:  3/28/2025  Initial Evaluation Date: 2025     Evaluating Therapist: RACHEL Romo/L, CLT-MOISÉS     Patient Name:  Lashawn Kapadia    :  1955    Restrictions/Precautions:   fall risk  Diagnosis:  History of breast cancer (Z85.3)         Date of Surgery/Injury: n/a    Insurance/Certification information:  Medical [place] Medicare Advantage   [id number]   Plan of care signed (Y/N): N  Visit#:  3   Total Visits to date:  Evaluation + visist #3   Current update duration from 2025 to 3/28/2025  Cancels/No-shows to date: 0  Last seen by therapist:  2025  Patient reaction to treatment:  ***  Garment / DME recommended: to be determined    Referring Practitioner:  Agustina Jones APRN   NPI: [id number]   Specific Practitioner Orders: Evaluation and Treatment    Assessment of current deficits   []Pain  []Skin Integrity   [x]Lymphedema   []Functional transfers/mobility   []ADLs   []Strength    []Cognition  []IADLs   []Safety Awareness   []  Motor Endurance    []Fine Motor Coordination   []Balance   []Vision/perception  []Sensation []Gross Motor Coordination  [x]ROM     OT PLAN OF CARE   OT POC based on physician orders, patient diagnosis and results of clinical assessment    Frequency/Duration:  ***  Specific OT Treatment to include:     Plan of Care:     []97140-Manual Lymph Drainage and Combined Decongestive Therapy  []94367- Skilled Multilayer Short Stretch Compression Bandaging/ Therapeutic Exercise  []Skin Care Education []HEP including Self MLD Education and/or Self Bandaging  []Education for Lymphedema Risks/Precautions     [] Caregiver Education   []other:      Patient Specific Goal: ***    Goals Status:    ***    Significant Findings At Last Visit/Comments:

## 2025-04-17 ENCOUNTER — TELEPHONE (OUTPATIENT)
Dept: OCCUPATIONAL THERAPY | Age: 70
End: 2025-04-17

## 2025-04-17 ENCOUNTER — HOSPITAL ENCOUNTER (OUTPATIENT)
Dept: OCCUPATIONAL THERAPY | Age: 70
Setting detail: THERAPIES SERIES
Discharge: HOME | End: 2025-04-17

## 2025-04-17 NOTE — TELEPHONE ENCOUNTER
Patient called clinic and left message regarding cancelling scheduled appointment 25Apr. 2025.  Patient did not want to reschedule and would prefer to be discharged at this time.  Therapist to write discharge.

## 2025-04-17 NOTE — DISCHARGE SUMMARY
Occupational Therapy  OCCUPATIONAL THERAPY DISCHARGE NOTE  [place] [address] [facility]  [address]              Phone: [phone]             Fax: [phone]     Date:  2025  Initial Evaluation Date: 2025     Evaluating Therapist: RACHEL Romo/L, CLT-MOISÉS     Patient Name:  Lashawn Kapadia    :  1955    Restrictions/Precautions:   fall risk  Diagnosis:  History of breast cancer (Z85.3)         Date of Surgery/Injury: n/a    Insurance/Certification information:  Medical [place] Medicare Advantage   [id number]   Plan of care signed (Y/N): N  Visit#:  3   Total Visits to date:  Evaluation + visist #3   Current update duration from 2025 to 2025  Cancels/No-shows to date: 0  Last seen by therapist:  2025  Patient called and cancelled scheduled follow up appointment.  Patient prefers to be discharged at this time.    Garment / DME recommended: n/a    Referring Practitioner:  Agustina Jones APRN   NPI: [id number]   Specific Practitioner Orders: Evaluation and Treatment    Assessment of current deficits   []Pain  []Skin Integrity   [x]Lymphedema   []Functional transfers/mobility   []ADLs   []Strength    []Cognition  []IADLs   []Safety Awareness   []  Motor Endurance    []Fine Motor Coordination   []Balance   []Vision/perception  []Sensation []Gross Motor Coordination  [x]ROM     OT PLAN OF CARE   OT POC based on physician orders, patient diagnosis and results of clinical assessment    Specific OT Treatment to include:     Plan of Care:     [x]97140-Manual Lymph Drainage and Combined Decongestive Therapy  [x]69943- Skilled Multilayer Short Stretch Compression Bandaging/ Therapeutic Exercise  [x]Skin Care Education [x]HEP including Self MLD Education and/or Self Bandaging  [x]Education for Lymphedema Risks/Precautions     [x] Caregiver Education   []other:      Patient Specific Goal: to make left side stronger, decrease pain    Goals Status:

## 2025-06-10 DIAGNOSIS — I10 PRIMARY HYPERTENSION: ICD-10-CM

## 2025-06-10 DIAGNOSIS — E55.9 VITAMIN D INSUFFICIENCY: ICD-10-CM

## 2025-06-10 DIAGNOSIS — E78.5 DYSLIPIDEMIA (HIGH LDL; LOW HDL): ICD-10-CM

## 2025-06-10 DIAGNOSIS — R73.9 HYPERGLYCEMIA: ICD-10-CM

## 2025-06-10 LAB
ALBUMIN: 4.3 G/DL (ref 3.5–5.2)
ALP BLD-CCNC: 104 U/L (ref 35–104)
ALT SERPL-CCNC: 22 U/L (ref 0–35)
ANION GAP SERPL CALCULATED.3IONS-SCNC: 10 MMOL/L (ref 7–16)
AST SERPL-CCNC: 22 U/L (ref 0–35)
BASOPHILS ABSOLUTE: 0.04 K/UL (ref 0–0.2)
BASOPHILS RELATIVE PERCENT: 1 % (ref 0–2)
BILIRUB SERPL-MCNC: 0.3 MG/DL (ref 0–1.2)
BUN BLDV-MCNC: 13 MG/DL (ref 8–23)
CALCIUM SERPL-MCNC: 9.8 MG/DL (ref 8.8–10.2)
CHLORIDE BLD-SCNC: 107 MMOL/L (ref 98–107)
CHOLESTEROL, TOTAL: 202 MG/DL
CO2: 27 MMOL/L (ref 22–29)
CREAT SERPL-MCNC: 0.6 MG/DL (ref 0.5–1)
EOSINOPHILS ABSOLUTE: 0.13 K/UL (ref 0.05–0.5)
EOSINOPHILS RELATIVE PERCENT: 2 % (ref 0–6)
GFR, ESTIMATED: >90 ML/MIN/1.73M2
GLUCOSE BLD-MCNC: 95 MG/DL (ref 74–99)
HBA1C MFR BLD: 5.5 % (ref 4–5.6)
HCT VFR BLD CALC: 42.3 % (ref 34–48)
HDLC SERPL-MCNC: 65 MG/DL
HEMOGLOBIN: 13.6 G/DL (ref 11.5–15.5)
IMMATURE GRANULOCYTES %: 0 % (ref 0–5)
IMMATURE GRANULOCYTES ABSOLUTE: <0.03 K/UL (ref 0–0.58)
LDL CHOLESTEROL: 109 MG/DL
LYMPHOCYTES ABSOLUTE: 0.93 K/UL (ref 1.5–4)
LYMPHOCYTES RELATIVE PERCENT: 17 % (ref 20–42)
MCH RBC QN AUTO: 30.6 PG (ref 26–35)
MCHC RBC AUTO-ENTMCNC: 32.2 G/DL (ref 32–34.5)
MCV RBC AUTO: 95.1 FL (ref 80–99.9)
MONOCYTES ABSOLUTE: 0.38 K/UL (ref 0.1–0.95)
MONOCYTES RELATIVE PERCENT: 7 % (ref 2–12)
NEUTROPHILS ABSOLUTE: 3.88 K/UL (ref 1.8–7.3)
NEUTROPHILS RELATIVE PERCENT: 72 % (ref 43–80)
PDW BLD-RTO: 12.6 % (ref 11.5–15)
PLATELET # BLD: 260 K/UL (ref 130–450)
PMV BLD AUTO: 12 FL (ref 7–12)
POTASSIUM SERPL-SCNC: 4.8 MMOL/L (ref 3.5–5.1)
RBC # BLD: 4.45 M/UL (ref 3.5–5.5)
SODIUM BLD-SCNC: 143 MMOL/L (ref 136–145)
TOTAL PROTEIN: 7.6 G/DL (ref 6.4–8.3)
TRIGL SERPL-MCNC: 144 MG/DL
VITAMIN D 25-HYDROXY: 50.2 NG/ML (ref 30–100)
VLDLC SERPL CALC-MCNC: 29 MG/DL
WBC # BLD: 5.4 K/UL (ref 4.5–11.5)

## 2025-06-17 ENCOUNTER — OFFICE VISIT (OUTPATIENT)
Dept: FAMILY MEDICINE CLINIC | Age: 70
End: 2025-06-17
Payer: MEDICARE

## 2025-06-17 VITALS
WEIGHT: 178 LBS | RESPIRATION RATE: 16 BRPM | SYSTOLIC BLOOD PRESSURE: 136 MMHG | HEIGHT: 66 IN | BODY MASS INDEX: 28.61 KG/M2 | OXYGEN SATURATION: 98 % | DIASTOLIC BLOOD PRESSURE: 80 MMHG | HEART RATE: 67 BPM

## 2025-06-17 DIAGNOSIS — Z00.00 MEDICARE ANNUAL WELLNESS VISIT, SUBSEQUENT: Primary | ICD-10-CM

## 2025-06-17 DIAGNOSIS — I10 PRIMARY HYPERTENSION: ICD-10-CM

## 2025-06-17 DIAGNOSIS — Z17.0 MALIGNANT NEOPLASM OF UPPER-OUTER QUADRANT OF LEFT BREAST IN FEMALE, ESTROGEN RECEPTOR POSITIVE (HCC): ICD-10-CM

## 2025-06-17 DIAGNOSIS — C50.412 MALIGNANT NEOPLASM OF UPPER-OUTER QUADRANT OF LEFT BREAST IN FEMALE, ESTROGEN RECEPTOR POSITIVE (HCC): ICD-10-CM

## 2025-06-17 PROCEDURE — 3075F SYST BP GE 130 - 139MM HG: CPT | Performed by: FAMILY MEDICINE

## 2025-06-17 PROCEDURE — 1160F RVW MEDS BY RX/DR IN RCRD: CPT | Performed by: FAMILY MEDICINE

## 2025-06-17 PROCEDURE — 3079F DIAST BP 80-89 MM HG: CPT | Performed by: FAMILY MEDICINE

## 2025-06-17 PROCEDURE — 99213 OFFICE O/P EST LOW 20 MIN: CPT | Performed by: FAMILY MEDICINE

## 2025-06-17 PROCEDURE — G0439 PPPS, SUBSEQ VISIT: HCPCS | Performed by: FAMILY MEDICINE

## 2025-06-17 PROCEDURE — 1123F ACP DISCUSS/DSCN MKR DOCD: CPT | Performed by: FAMILY MEDICINE

## 2025-06-17 PROCEDURE — 1159F MED LIST DOCD IN RCRD: CPT | Performed by: FAMILY MEDICINE

## 2025-06-17 RX ORDER — LISINOPRIL 20 MG/1
TABLET ORAL
Qty: 90 TABLET | Refills: 1 | Status: SHIPPED | OUTPATIENT
Start: 2025-06-17 | End: 2025-12-17

## 2025-06-17 SDOH — ECONOMIC STABILITY: TRANSPORTATION INSECURITY
IN THE PAST 12 MONTHS, HAS THE LACK OF TRANSPORTATION KEPT YOU FROM MEDICAL APPOINTMENTS OR FROM GETTING MEDICATIONS?: NO

## 2025-06-17 SDOH — ECONOMIC STABILITY: INCOME INSECURITY: IN THE LAST 12 MONTHS, WAS THERE A TIME WHEN YOU WERE NOT ABLE TO PAY THE MORTGAGE OR RENT ON TIME?: NO

## 2025-06-17 SDOH — ECONOMIC STABILITY: FOOD INSECURITY: WITHIN THE PAST 12 MONTHS, THE FOOD YOU BOUGHT JUST DIDN'T LAST AND YOU DIDN'T HAVE MONEY TO GET MORE.: NEVER TRUE

## 2025-06-17 SDOH — HEALTH STABILITY: PHYSICAL HEALTH: ON AVERAGE, HOW MANY MINUTES DO YOU ENGAGE IN EXERCISE AT THIS LEVEL?: 50 MIN

## 2025-06-17 SDOH — HEALTH STABILITY: PHYSICAL HEALTH: ON AVERAGE, HOW MANY DAYS PER WEEK DO YOU ENGAGE IN MODERATE TO STRENUOUS EXERCISE (LIKE A BRISK WALK)?: 5 DAYS

## 2025-06-17 SDOH — ECONOMIC STABILITY: FOOD INSECURITY: WITHIN THE PAST 12 MONTHS, YOU WORRIED THAT YOUR FOOD WOULD RUN OUT BEFORE YOU GOT MONEY TO BUY MORE.: NEVER TRUE

## 2025-06-17 ASSESSMENT — PATIENT HEALTH QUESTIONNAIRE - PHQ9
SUM OF ALL RESPONSES TO PHQ QUESTIONS 1-9: 0
SUM OF ALL RESPONSES TO PHQ QUESTIONS 1-9: 0
2. FEELING DOWN, DEPRESSED OR HOPELESS: NOT AT ALL
SUM OF ALL RESPONSES TO PHQ QUESTIONS 1-9: 0
1. LITTLE INTEREST OR PLEASURE IN DOING THINGS: NOT AT ALL
SUM OF ALL RESPONSES TO PHQ QUESTIONS 1-9: 0

## 2025-06-17 ASSESSMENT — LIFESTYLE VARIABLES
HOW OFTEN DO YOU HAVE A DRINK CONTAINING ALCOHOL: 1
HOW OFTEN DO YOU HAVE SIX OR MORE DRINKS ON ONE OCCASION: 1
HOW MANY STANDARD DRINKS CONTAINING ALCOHOL DO YOU HAVE ON A TYPICAL DAY: PATIENT DOES NOT DRINK
HOW OFTEN DO YOU HAVE A DRINK CONTAINING ALCOHOL: NEVER
HOW MANY STANDARD DRINKS CONTAINING ALCOHOL DO YOU HAVE ON A TYPICAL DAY: 0

## 2025-06-17 NOTE — PROGRESS NOTES
Medicare Annual Wellness Visit    Lashawn Harrelljerica is here for     Chief Complaint   Patient presents with    Medicare AWV    Discuss Labs    Mass     Left side, underneath breast area, feels like is something there. X months.           Assessment & Plan     Medicare annual wellness visit, subsequent    Primary hypertension  -     lisinopril (PRINIVIL;ZESTRIL) 20 MG tablet; TAKE ONE TABLET BY MOUTH DAILY FOR HIGH BLOOD PRESSURE DISORDER, Disp-90 tablet, R-16/17/25: DISREGARD PREVIOUS PRESCRIPTIONS AND REFILLS; HONOR THIS PRESCRIPTION AND REFILLS. SIX MONTH SUPPLY; SCHEDULE VISIT FOR PCP FOR REFILLSNormal    Malignant neoplasm of upper-outer quadrant of left breast in female, estrogen receptor positive (HCC)  Assessment & Plan:   Monitored by specialist- no acute findings meriting change in the plan    Follow Up:  Return 1) F/U 6 months (Check up, medication refills), for 2) Medicare Annual Wellness Visit in 1 year.       Subjective     The following acute and/or chronic problems were also addressed today:    Patient has noted a \"mass of LUQ of abdomen. It is underneath breast area (same side as mastectomy following breast cancer diagnosis) She feels like is something there. X months.     She otherwise reports that she feels very well.  She is eating well, she is exercising almost daily.  She is taking a B-complex, gabapentin for chemotherapy based neuropathy.    Patient's complete Health Risk Assessment and screening values have been reviewed and are found in Flowsheets. The following problems were reviewed today and where indicated follow up appointments were made and/or referrals ordered.    Positive Risk Factor Screenings with Interventions:    Fall Risk:  Do you feel unsteady or are you worried about falling? : (!) (Patient-Rptd) yes  2 or more falls in past year?: (Patient-Rptd) no  Fall with injury in past year?: (Patient-Rptd) no     Interventions:    Reviewed medications, home hazards, visual acuity,

## 2025-06-17 NOTE — PATIENT INSTRUCTIONS
you, and not smoking or using tobacco. It also includes taking medicines as directed, managing other health conditions, and trying to get a healthy amount of sleep.  Follow-up care is a key part of your treatment and safety. Be sure to make and go to all appointments, and call your doctor if you are having problems. It's also a good idea to know your test results and keep a list of the medicines you take.  How can you care for yourself at home?  Diet    Use less salt when you cook and eat. This helps lower your blood pressure. Taste food before salting. Add only a little salt when you think you need it. With time, your taste buds will adjust to less salt.     Eat fewer snack items, fast foods, canned soups, and other high-salt, high-fat, processed foods.     Read food labels and try to avoid saturated and trans fats. They increase your risk of heart disease by raising cholesterol levels.     Limit the amount of solid fat--butter, margarine, and shortening--you eat. Use olive, peanut, or canola oil when you cook. Bake, broil, and steam foods instead of frying them.     Eat a variety of fruit and vegetables every day. Dark green, deep orange, red, or yellow fruits and vegetables are especially good for you. Examples include spinach, carrots, peaches, and berries.     Foods high in fiber can reduce your cholesterol and provide important vitamins and minerals. High-fiber foods include whole-grain cereals and breads, oatmeal, beans, brown rice, citrus fruits, and apples.     Eat lean proteins. Heart-healthy proteins include seafood, lean meats and poultry, eggs, beans, peas, nuts, seeds, and soy products.     Limit drinks and foods with added sugar. These include candy, desserts, and soda pop.   Heart-healthy lifestyle    If your doctor recommends it, get more exercise. For many people, walking is a good choice. Or you may want to swim, bike, or do other activities. Bit by bit, increase the time you're active every day.

## 2025-08-18 ENCOUNTER — HOSPITAL ENCOUNTER (OUTPATIENT)
Dept: INFUSION THERAPY | Age: 70
Discharge: HOME | End: 2025-08-18
Payer: MEDICARE

## 2025-08-18 ENCOUNTER — OFFICE VISIT (OUTPATIENT)
Age: 70
End: 2025-08-18
Payer: MEDICARE

## 2025-08-18 VITALS
HEART RATE: 62 BPM | OXYGEN SATURATION: 99 % | DIASTOLIC BLOOD PRESSURE: 71 MMHG | WEIGHT: 177.3 LBS | SYSTOLIC BLOOD PRESSURE: 153 MMHG | HEIGHT: 66 IN | TEMPERATURE: 97 F | BODY MASS INDEX: 28.49 KG/M2

## 2025-08-18 DIAGNOSIS — M81.0 OSTEOPOROSIS, UNSPECIFIED OSTEOPOROSIS TYPE, UNSPECIFIED PATHOLOGICAL FRACTURE PRESENCE: ICD-10-CM

## 2025-08-18 DIAGNOSIS — Z17.0 MALIGNANT NEOPLASM OF UPPER-OUTER QUADRANT OF LEFT BREAST IN FEMALE, ESTROGEN RECEPTOR POSITIVE (HCC): Primary | ICD-10-CM

## 2025-08-18 DIAGNOSIS — C50.412 MALIGNANT NEOPLASM OF UPPER-OUTER QUADRANT OF LEFT BREAST IN FEMALE, ESTROGEN RECEPTOR POSITIVE (HCC): Primary | ICD-10-CM

## 2025-08-18 DIAGNOSIS — Z17.0 MALIGNANT NEOPLASM OF UPPER-OUTER QUADRANT OF LEFT BREAST IN FEMALE, ESTROGEN RECEPTOR POSITIVE (HCC): ICD-10-CM

## 2025-08-18 DIAGNOSIS — Z85.3 PERSONAL HISTORY OF BREAST CANCER: ICD-10-CM

## 2025-08-18 DIAGNOSIS — C50.412 MALIGNANT NEOPLASM OF UPPER-OUTER QUADRANT OF LEFT BREAST IN FEMALE, ESTROGEN RECEPTOR POSITIVE (HCC): ICD-10-CM

## 2025-08-18 DIAGNOSIS — Z79.811 USE OF AROMATASE INHIBITORS: Primary | ICD-10-CM

## 2025-08-18 LAB
ALBUMIN SERPL-MCNC: 4.2 G/DL (ref 3.5–5.2)
ALP SERPL-CCNC: 111 U/L (ref 35–104)
ALT SERPL-CCNC: 22 U/L (ref 0–35)
ANION GAP SERPL CALCULATED.3IONS-SCNC: 13 MMOL/L (ref 7–16)
AST SERPL-CCNC: 21 U/L (ref 0–35)
BASOPHILS # BLD: 0.04 K/UL (ref 0–0.2)
BASOPHILS NFR BLD: 1 % (ref 0–2)
BILIRUB SERPL-MCNC: 0.3 MG/DL (ref 0–1.2)
BUN SERPL-MCNC: 18 MG/DL (ref 8–23)
CALCIUM SERPL-MCNC: 10.2 MG/DL (ref 8.8–10.2)
CHLORIDE SERPL-SCNC: 105 MMOL/L (ref 98–107)
CO2 SERPL-SCNC: 24 MMOL/L (ref 22–29)
CREAT SERPL-MCNC: 0.6 MG/DL (ref 0.5–1)
EOSINOPHIL # BLD: 0.14 K/UL (ref 0.05–0.5)
EOSINOPHILS RELATIVE PERCENT: 3 % (ref 0–6)
ERYTHROCYTE [DISTWIDTH] IN BLOOD BY AUTOMATED COUNT: 12.6 % (ref 11.5–15)
GFR, ESTIMATED: >90 ML/MIN/1.73M2
GLUCOSE SERPL-MCNC: 91 MG/DL (ref 74–99)
HCT VFR BLD AUTO: 42 % (ref 34–48)
HGB BLD-MCNC: 13.6 G/DL (ref 11.5–15.5)
IMM GRANULOCYTES # BLD AUTO: <0.03 K/UL (ref 0–0.58)
IMM GRANULOCYTES NFR BLD: 0 % (ref 0–5)
LYMPHOCYTES NFR BLD: 0.89 K/UL (ref 1.5–4)
LYMPHOCYTES RELATIVE PERCENT: 16 % (ref 20–42)
MCH RBC QN AUTO: 30.2 PG (ref 26–35)
MCHC RBC AUTO-ENTMCNC: 32.4 G/DL (ref 32–34.5)
MCV RBC AUTO: 93.3 FL (ref 80–99.9)
MONOCYTES NFR BLD: 0.36 K/UL (ref 0.1–0.95)
MONOCYTES NFR BLD: 7 % (ref 2–12)
NEUTROPHILS NFR BLD: 73 % (ref 43–80)
NEUTS SEG NFR BLD: 4 K/UL (ref 1.8–7.3)
PLATELET # BLD AUTO: 236 K/UL (ref 130–450)
PMV BLD AUTO: 10.6 FL (ref 7–12)
POTASSIUM SERPL-SCNC: 4.4 MMOL/L (ref 3.5–5.1)
PROT SERPL-MCNC: 7.5 G/DL (ref 6.4–8.3)
RBC # BLD AUTO: 4.5 M/UL (ref 3.5–5.5)
SODIUM SERPL-SCNC: 143 MMOL/L (ref 136–145)
WBC OTHER # BLD: 5.5 K/UL (ref 4.5–11.5)

## 2025-08-18 PROCEDURE — 1126F AMNT PAIN NOTED NONE PRSNT: CPT | Performed by: NURSE PRACTITIONER

## 2025-08-18 PROCEDURE — 1123F ACP DISCUSS/DSCN MKR DOCD: CPT | Performed by: NURSE PRACTITIONER

## 2025-08-18 PROCEDURE — 1159F MED LIST DOCD IN RCRD: CPT | Performed by: NURSE PRACTITIONER

## 2025-08-18 PROCEDURE — 80053 COMPREHEN METABOLIC PANEL: CPT

## 2025-08-18 PROCEDURE — 99214 OFFICE O/P EST MOD 30 MIN: CPT | Performed by: NURSE PRACTITIONER

## 2025-08-18 PROCEDURE — 36415 COLL VENOUS BLD VENIPUNCTURE: CPT

## 2025-08-18 PROCEDURE — 1160F RVW MEDS BY RX/DR IN RCRD: CPT | Performed by: NURSE PRACTITIONER

## 2025-08-18 PROCEDURE — 96372 THER/PROPH/DIAG INJ SC/IM: CPT

## 2025-08-18 PROCEDURE — 6360000002 HC RX W HCPCS: Performed by: CLINICAL NURSE SPECIALIST

## 2025-08-18 PROCEDURE — 3077F SYST BP >= 140 MM HG: CPT | Performed by: NURSE PRACTITIONER

## 2025-08-18 PROCEDURE — 85025 COMPLETE CBC W/AUTO DIFF WBC: CPT

## 2025-08-18 PROCEDURE — 3078F DIAST BP <80 MM HG: CPT | Performed by: NURSE PRACTITIONER

## 2025-08-18 RX ORDER — ANASTROZOLE 1 MG/1
1 TABLET ORAL DAILY
Qty: 90 TABLET | Refills: 2 | Status: SHIPPED | OUTPATIENT
Start: 2025-08-18

## 2025-08-18 RX ADMIN — DENOSUMAB 60 MG: 60 INJECTION SUBCUTANEOUS at 12:07

## (undated) DEVICE — APPLICATOR MEDICATED 26 CC SOLUTION HI LT ORNG CHLORAPREP

## (undated) DEVICE — WIPES SKIN CLOTH READYPREP 9 X 10.5 IN 2% CHLORHEX GLUCONATE CHG PREOP

## (undated) DEVICE — GAUZE,SPONGE,4"X4",8PLY,STRL,LF,10/TRAY: Brand: MEDLINE

## (undated) DEVICE — BNDG,ELSTC,MATRIX,STRL,6"X5YD,LF,HOOK&LP: Brand: MEDLINE

## (undated) DEVICE — APPLICATOR PREP 26ML 0.7% IOD POVACRYLEX 74% ISO ALC ST

## (undated) DEVICE — 3M™ IOBAN™ 2 ANTIMICROBIAL INCISE DRAPE 6650EZ: Brand: IOBAN™ 2

## (undated) DEVICE — PADDING CAST W6INXL4YD COT LO LINTING WYTEX

## (undated) DEVICE — SUTURE STRATAFIX SYMMETRIC SZ 1 L18IN ABSRB VLT CT1 L36CM SXPP1A404

## (undated) DEVICE — BLADE,STAINLESS-STEEL,10,STRL,DISPOSABLE: Brand: MEDLINE

## (undated) DEVICE — PACK PROCEDURE SURG GEN CUST

## (undated) DEVICE — PEEL-AWAY HOOD: Brand: FLYTE, SURGICOOL

## (undated) DEVICE — DRAPE,REIN 53X77,STERILE: Brand: MEDLINE

## (undated) DEVICE — GOWN,SIRUS,POLYRNF,BRTHSLV,LG,30/CS: Brand: MEDLINE

## (undated) DEVICE — GLOVE SURG SZ 8 L12IN FNGR THK79MIL GRN LTX FREE

## (undated) DEVICE — DOUBLE BASIN SET: Brand: MEDLINE INDUSTRIES, INC.

## (undated) DEVICE — COVER,BOOT,FOAM,NON-SKID,HOOK-LOOP,XLG: Brand: MEDLINE INDUSTRIES, INC.

## (undated) DEVICE — Device: Brand: STABLECUT®

## (undated) DEVICE — INTENDED FOR TISSUE SEPARATION, AND OTHER PROCEDURES THAT REQUIRE A SHARP SURGICAL BLADE TO PUNCTURE OR CUT.: Brand: BARD-PARKER ® STAINLESS STEEL BLADES

## (undated) DEVICE — NEEDLE SPNL L3.5IN PNK HUB S STL REG WALL FIT STYL W/ QNCKE

## (undated) DEVICE — PATIENT RETURN ELECTRODE, SINGLE-USE, CONTACT QUALITY MONITORING, ADULT, WITH 9FT CORD, FOR PATIENTS WEIGING OVER 33LBS. (15KG): Brand: MEGADYNE

## (undated) DEVICE — GOWN,SIRUS,POLYRNF,BRTHSLV,XL,30/CS: Brand: MEDLINE

## (undated) DEVICE — BANDAGE COMPR W6INXL12FT SMOOTH FOR LIMB EXSANG ESMARCH

## (undated) DEVICE — SYRINGE, LUER LOCK, 10ML: Brand: MEDLINE

## (undated) DEVICE — BASIC SINGLE BASIN 1-LF: Brand: MEDLINE INDUSTRIES, INC.

## (undated) DEVICE — SURGICAL PROCEDURE PACK BASIC

## (undated) DEVICE — HANDPIECE SET WITH COAXIAL HIGH FLOW TIP AND SUCTION TUBE: Brand: INTERPULSE

## (undated) DEVICE — 3M™ COBAN™ NL STERILE NON-LATEX SELF-ADHERENT WRAP, 2084S, 4 IN X 5 YD (10 CM X 4,5 M), 18 ROLLS/CASE: Brand: 3M™ COBAN™

## (undated) DEVICE — GLOVE ORANGE PI 8   MSG9080

## (undated) DEVICE — GLOVE SURG SZ 65 THK91MIL LTX FREE SYN POLYISOPRENE

## (undated) DEVICE — NEEDLE,22GX1.5",REG,BEVEL: Brand: MEDLINE

## (undated) DEVICE — SYSTEM VAC MIX SGL DBL CLEARMIX 10 PER CA

## (undated) DEVICE — SOLUTION IRRIG 1000ML STRL H2O USP PLAS POUR BTL

## (undated) DEVICE — TOWEL,OR,DSP,ST,BLUE,STD,6/PK,12PK/CS: Brand: MEDLINE

## (undated) DEVICE — SYRINGE MED 50ML LUERLOCK TIP

## (undated) DEVICE — GLOVE ORANGE PI 7   MSG9070

## (undated) DEVICE — SYRINGE MED 30ML STD CLR PLAS LUERLOCK TIP N CTRL DISP

## (undated) DEVICE — TOTAL KNEE PK

## (undated) DEVICE — SET INST MINOR PROCEDURE

## (undated) DEVICE — PACK,UNIV, II AURORA: Brand: MEDLINE

## (undated) DEVICE — SYRINGE WITH HYPODERMIC SAFETY NEEDLE: Brand: MAGELLAN

## (undated) DEVICE — COVER HNDL LT DISP

## (undated) DEVICE — BLUNTFILL: Brand: MONOJECT

## (undated) DEVICE — PACK,HEAVY DRAINAGE,STERILE: Brand: MEDLINE INDUSTRIES, INC.

## (undated) DEVICE — 3M™ STERI-DRAPE™ U-DRAPE 1015: Brand: STERI-DRAPE™

## (undated) DEVICE — 4-PORT MANIFOLD: Brand: NEPTUNE 2

## (undated) DEVICE — LEGGINGS: Brand: CONVERTORS

## (undated) DEVICE — TUBING, SUCTION, 9/32" X 10', STRAIGHT: Brand: MEDLINE

## (undated) DEVICE — ELECTRODE PT RET AD L9FT HI MOIST COND ADH HYDRGEL CORDED

## (undated) DEVICE — DRAPE,TOP,102X53,STERILE: Brand: MEDLINE

## (undated) DEVICE — LIQUIBAND RAPID ADHESIVE 36/CS 0.8ML: Brand: MEDLINE

## (undated) DEVICE — DRESSING HYDROFIBER AQUACEL AG ADVANTAGE 3.5X10 IN

## (undated) DEVICE — TAPE ADH W3INXL10YD WHT COT WVN BK POWERFUL RUB BASE HIGHLY

## (undated) DEVICE — BLADE RMR L38MM PAT PILOT H

## (undated) DEVICE — SOLUTION IRRIG 3000ML 0.9% SOD CHL USP UROMATIC PLAS CONT

## (undated) DEVICE — SPONGE LAP W18XL18IN WHT COT 4 PLY FLD STRUNG RADPQ DISP ST 2 PER PACK

## (undated) DEVICE — STRIP,CLOSURE,WOUND,MEDI-STRIP,1/2X4: Brand: MEDLINE